# Patient Record
Sex: FEMALE | Race: WHITE | Employment: OTHER | URBAN - METROPOLITAN AREA
[De-identification: names, ages, dates, MRNs, and addresses within clinical notes are randomized per-mention and may not be internally consistent; named-entity substitution may affect disease eponyms.]

---

## 2020-01-16 ENCOUNTER — APPOINTMENT (OUTPATIENT)
Dept: GENERAL RADIOLOGY | Age: 70
End: 2020-01-16
Payer: MEDICARE

## 2020-01-16 ENCOUNTER — HOSPITAL ENCOUNTER (EMERGENCY)
Age: 70
Discharge: HOME | End: 2020-01-16
Attending: EMERGENCY MEDICINE
Payer: MEDICARE

## 2020-01-16 VITALS
WEIGHT: 160 LBS | TEMPERATURE: 99.2 F | HEIGHT: 64 IN | RESPIRATION RATE: 16 BRPM | DIASTOLIC BLOOD PRESSURE: 86 MMHG | SYSTOLIC BLOOD PRESSURE: 134 MMHG | OXYGEN SATURATION: 95 % | BODY MASS INDEX: 27.31 KG/M2 | HEART RATE: 85 BPM

## 2020-01-16 PROCEDURE — 70360 X-RAY EXAM OF NECK: CPT

## 2020-01-16 PROCEDURE — 99282 EMERGENCY DEPT VISIT SF MDM: CPT

## 2020-01-16 PROCEDURE — 96372 THER/PROPH/DIAG INJ SC/IM: CPT

## 2020-01-16 PROCEDURE — 6360000002 HC RX W HCPCS: Performed by: EMERGENCY MEDICINE

## 2020-01-16 RX ADMIN — PENICILLIN G BENZATHINE 1.2 MILLION UNITS: 1200000 INJECTION, SUSPENSION INTRAMUSCULAR at 21:33

## 2020-01-16 SDOH — HEALTH STABILITY: MENTAL HEALTH: HOW OFTEN DO YOU HAVE A DRINK CONTAINING ALCOHOL?: NEVER

## 2020-01-16 ASSESSMENT — PAIN DESCRIPTION - PAIN TYPE: TYPE: ACUTE PAIN

## 2020-01-16 ASSESSMENT — PAIN DESCRIPTION - DESCRIPTORS: DESCRIPTORS: SORE

## 2020-01-16 ASSESSMENT — PAIN DESCRIPTION - LOCATION: LOCATION: THROAT

## 2020-01-16 ASSESSMENT — PAIN SCALES - GENERAL: PAINLEVEL_OUTOF10: 8

## 2020-01-17 NOTE — ED PROVIDER NOTES
ago.  Patient was seen by her PCP one week ago and was given a Decadron shot for sinusitis. Patient had a sore throat at that time that has gotten increasingly worse over the past week. On physical examination the patient's posterior pharynx is erythematous, no exudates, no swelling. XR of the soft tissue of the neck ordered. Penicillin given. Discussed results with the patient and she will be discharged home and is to follow up if symptoms worsen. Re-Evaluations:           Re-evaluation. Patients symptoms show no change    Consultations:             none    Counseling: The emergency provider has spoken with the patient and discussed todays results, in addition to providing specific details for the plan of care and counseling regarding the diagnosis and prognosis. Questions are answered at this time and they are agreeable with the plan.     --------------------------------- IMPRESSION AND DISPOSITION ---------------------------------    IMPRESSION  1. Acute pharyngitis, unspecified etiology        DISPOSITION  Disposition: Discharge to home  Patient condition is stable    1/16/20, 8:48 PM.    This note is prepared by Binta Bird, acting as Scribe for Rudy Kramer MD.    Rudy Kramer MD:  The scribe's documentation has been prepared under my direction and personally reviewed by me in its entirety. I confirm that the note above accurately reflects all work, treatment, procedures, and medical decision making performed by me.            Rudy Kramer MD  01/17/20 6314

## 2020-05-28 ENCOUNTER — HOSPITAL ENCOUNTER (OUTPATIENT)
Age: 70
Setting detail: SPECIMEN
Discharge: HOME | End: 2020-05-28
Payer: MEDICARE

## 2020-05-28 PROCEDURE — U0003 INFECTIOUS AGENT DETECTION BY NUCLEIC ACID (DNA OR RNA); SEVERE ACUTE RESPIRATORY SYNDROME CORONAVIRUS 2 (SARS-COV-2) (CORONAVIRUS DISEASE [COVID-19]), AMPLIFIED PROBE TECHNIQUE, MAKING USE OF HIGH THROUGHPUT TECHNOLOGIES AS DESCRIBED BY CMS-2020-01-R: HCPCS

## 2020-05-29 LAB
SARS-COV-2: NOT DETECTED
SOURCE: NORMAL

## 2020-06-17 ENCOUNTER — HOSPITAL ENCOUNTER (OUTPATIENT)
Age: 70
Discharge: HOME | End: 2020-06-19
Payer: MEDICARE

## 2020-06-17 PROCEDURE — U0003 INFECTIOUS AGENT DETECTION BY NUCLEIC ACID (DNA OR RNA); SEVERE ACUTE RESPIRATORY SYNDROME CORONAVIRUS 2 (SARS-COV-2) (CORONAVIRUS DISEASE [COVID-19]), AMPLIFIED PROBE TECHNIQUE, MAKING USE OF HIGH THROUGHPUT TECHNOLOGIES AS DESCRIBED BY CMS-2020-01-R: HCPCS

## 2020-06-18 RX ORDER — AMITRIPTYLINE HYDROCHLORIDE 10 MG/1
10 TABLET, FILM COATED ORAL NIGHTLY
COMMUNITY

## 2020-06-20 LAB
SARS-COV-2: NOT DETECTED
SOURCE: NORMAL

## 2020-06-22 ENCOUNTER — ANESTHESIA EVENT (OUTPATIENT)
Dept: OPERATING ROOM | Age: 70
End: 2020-06-22
Payer: MEDICARE

## 2020-06-22 NOTE — ANESTHESIA PRE PROCEDURE
Department of Anesthesiology  Preprocedure Note       Name:  Dionte Allred   Age:  65 y.o.  :  1950                                          MRN:  [id number]         Date:  2020      Surgeon: Cristina Graf):  Rin Melvin MD    Procedure: Procedure(s):  RIGHT EYE CATARACT EMULSIFICATION IOL IMPLANT    Medications prior to admission:   Prior to Admission medications    Medication Sig Start Date End Date Taking? Authorizing Provider   amitriptyline (ELAVIL) 10 MG tablet Take 10 mg by mouth nightly Takes 60 mg   Yes Historical Provider, MD   propranolol (INDERAL) 10 MG tablet Take 10 mg by mouth 3 times daily. Yes Historical Provider, MD   ARMOUR THYROID PO Take 60 mg by mouth daily    Yes Historical Provider, MD   ALPRAZolam Quevedo Gammons) 0.5 MG tablet Take 0.5 mg by mouth nightly as needed for Sleep. Yes Historical Provider, MD   traMADol (ULTRAM) 50 MG tablet Take 50 mg by mouth nightly. Yes Historical Provider, MD   LISINOPRIL PO Take 20 mg by mouth daily 3pm    Historical Provider, MD   Pregabalin (LYRICA PO) Take 50 mg by mouth nightly     Historical Provider, MD       Current medications:    No current facility-administered medications for this encounter. Current Outpatient Medications   Medication Sig Dispense Refill    amitriptyline (ELAVIL) 10 MG tablet Take 10 mg by mouth nightly Takes 60 mg      propranolol (INDERAL) 10 MG tablet Take 10 mg by mouth 3 times daily.  ARMOUR THYROID PO Take 60 mg by mouth daily       ALPRAZolam (XANAX) 0.5 MG tablet Take 0.5 mg by mouth nightly as needed for Sleep.  traMADol (ULTRAM) 50 MG tablet Take 50 mg by mouth nightly.  LISINOPRIL PO Take 20 mg by mouth daily 3pm      Pregabalin (LYRICA PO) Take 50 mg by mouth nightly          Allergies:     Allergies   Allergen Reactions    Adhesive Tape Itching     rash    Medrol [Methylprednisolone] Other (See Comments)     States \" out of body experience\"    Codeine Nausea And Vomiting    Airway: Mallampati: III  TM distance: >3 FB   Neck ROM: full  Mouth opening: > = 3 FB Dental:          Pulmonary: breath sounds clear to auscultation      (-) not a current smoker                           Cardiovascular:  Exercise tolerance: good (>4 METS),   (+) hypertension:, valvular problems/murmurs: MVP,         Rhythm: regular  Rate: normal                    Neuro/Psych:   (+) depression/anxiety  (Anxiety)            GI/Hepatic/Renal:             Endo/Other:    (+) hypothyroidism::., .                 Abdominal:   (+) obese,         Vascular:                                      Anesthesia Plan      MAC     ASA 3       Induction: intravenous. Anesthetic plan and risks discussed with patient. Plan discussed with CRNA.     Attending anesthesiologist reviewed and agrees with Mirella Bazan MD   6/22/2020

## 2020-06-23 ENCOUNTER — HOSPITAL ENCOUNTER (OUTPATIENT)
Age: 70
Setting detail: OUTPATIENT SURGERY
Discharge: HOME | End: 2020-06-23
Attending: OPHTHALMOLOGY | Admitting: OPHTHALMOLOGY
Payer: MEDICARE

## 2020-06-23 ENCOUNTER — ANESTHESIA (OUTPATIENT)
Dept: OPERATING ROOM | Age: 70
End: 2020-06-23
Payer: MEDICARE

## 2020-06-23 VITALS
TEMPERATURE: 98.6 F | SYSTOLIC BLOOD PRESSURE: 140 MMHG | RESPIRATION RATE: 16 BRPM | DIASTOLIC BLOOD PRESSURE: 77 MMHG | OXYGEN SATURATION: 100 %

## 2020-06-23 VITALS
HEART RATE: 71 BPM | DIASTOLIC BLOOD PRESSURE: 74 MMHG | HEIGHT: 64 IN | TEMPERATURE: 97.2 F | RESPIRATION RATE: 14 BRPM | SYSTOLIC BLOOD PRESSURE: 135 MMHG | WEIGHT: 190 LBS | BODY MASS INDEX: 32.44 KG/M2 | OXYGEN SATURATION: 100 %

## 2020-06-23 PROBLEM — H26.9 RIGHT CATARACT: Status: ACTIVE | Noted: 2020-06-23

## 2020-06-23 PROCEDURE — 3700000000 HC ANESTHESIA ATTENDED CARE: Performed by: OPHTHALMOLOGY

## 2020-06-23 PROCEDURE — 6360000002 HC RX W HCPCS: Performed by: NURSE ANESTHETIST, CERTIFIED REGISTERED

## 2020-06-23 PROCEDURE — 2580000003 HC RX 258: Performed by: ANESTHESIOLOGY

## 2020-06-23 PROCEDURE — 6370000000 HC RX 637 (ALT 250 FOR IP): Performed by: OPHTHALMOLOGY

## 2020-06-23 PROCEDURE — V2632 POST CHMBR INTRAOCULAR LENS: HCPCS | Performed by: OPHTHALMOLOGY

## 2020-06-23 PROCEDURE — 7100000011 HC PHASE II RECOVERY - ADDTL 15 MIN: Performed by: OPHTHALMOLOGY

## 2020-06-23 PROCEDURE — 3600000003 HC SURGERY LEVEL 3 BASE: Performed by: OPHTHALMOLOGY

## 2020-06-23 PROCEDURE — 2500000003 HC RX 250 WO HCPCS: Performed by: OPHTHALMOLOGY

## 2020-06-23 PROCEDURE — 2709999900 HC NON-CHARGEABLE SUPPLY: Performed by: OPHTHALMOLOGY

## 2020-06-23 PROCEDURE — 7100000010 HC PHASE II RECOVERY - FIRST 15 MIN: Performed by: OPHTHALMOLOGY

## 2020-06-23 DEVICE — IMPLANTABLE DEVICE: Type: IMPLANTABLE DEVICE | Site: EYE | Status: FUNCTIONAL

## 2020-06-23 RX ORDER — PROMETHAZINE HYDROCHLORIDE 25 MG/ML
25 INJECTION, SOLUTION INTRAMUSCULAR; INTRAVENOUS
Status: DISCONTINUED | OUTPATIENT
Start: 2020-06-23 | End: 2020-06-23 | Stop reason: HOSPADM

## 2020-06-23 RX ORDER — LABETALOL HYDROCHLORIDE 5 MG/ML
5 INJECTION, SOLUTION INTRAVENOUS EVERY 10 MIN PRN
Status: DISCONTINUED | OUTPATIENT
Start: 2020-06-23 | End: 2020-06-23 | Stop reason: HOSPADM

## 2020-06-23 RX ORDER — FENTANYL CITRATE 50 UG/ML
50 INJECTION, SOLUTION INTRAMUSCULAR; INTRAVENOUS EVERY 5 MIN PRN
Status: DISCONTINUED | OUTPATIENT
Start: 2020-06-23 | End: 2020-06-23 | Stop reason: HOSPADM

## 2020-06-23 RX ORDER — MORPHINE SULFATE 2 MG/ML
1 INJECTION, SOLUTION INTRAMUSCULAR; INTRAVENOUS EVERY 5 MIN PRN
Status: DISCONTINUED | OUTPATIENT
Start: 2020-06-23 | End: 2020-06-23 | Stop reason: HOSPADM

## 2020-06-23 RX ORDER — MIDAZOLAM HYDROCHLORIDE 1 MG/ML
INJECTION INTRAMUSCULAR; INTRAVENOUS PRN
Status: DISCONTINUED | OUTPATIENT
Start: 2020-06-23 | End: 2020-06-23 | Stop reason: SDUPTHER

## 2020-06-23 RX ORDER — HYDRALAZINE HYDROCHLORIDE 20 MG/ML
5 INJECTION INTRAMUSCULAR; INTRAVENOUS EVERY 10 MIN PRN
Status: DISCONTINUED | OUTPATIENT
Start: 2020-06-23 | End: 2020-06-23 | Stop reason: HOSPADM

## 2020-06-23 RX ORDER — HYDROCODONE BITARTRATE AND ACETAMINOPHEN 5; 325 MG/1; MG/1
2 TABLET ORAL PRN
Status: DISCONTINUED | OUTPATIENT
Start: 2020-06-23 | End: 2020-06-23 | Stop reason: HOSPADM

## 2020-06-23 RX ORDER — SODIUM CHLORIDE, SODIUM LACTATE, POTASSIUM CHLORIDE, CALCIUM CHLORIDE 600; 310; 30; 20 MG/100ML; MG/100ML; MG/100ML; MG/100ML
INJECTION, SOLUTION INTRAVENOUS CONTINUOUS
Status: DISCONTINUED | OUTPATIENT
Start: 2020-06-23 | End: 2020-06-23 | Stop reason: HOSPADM

## 2020-06-23 RX ORDER — PHENYLEPHRINE HCL 2.5 %
1 DROPS OPHTHALMIC (EYE)
Status: COMPLETED | OUTPATIENT
Start: 2020-06-23 | End: 2020-06-23

## 2020-06-23 RX ORDER — TETRACAINE HYDROCHLORIDE 5 MG/ML
1 SOLUTION OPHTHALMIC ONCE
Status: COMPLETED | OUTPATIENT
Start: 2020-06-23 | End: 2020-06-23

## 2020-06-23 RX ORDER — HYDROCODONE BITARTRATE AND ACETAMINOPHEN 5; 325 MG/1; MG/1
1 TABLET ORAL PRN
Status: DISCONTINUED | OUTPATIENT
Start: 2020-06-23 | End: 2020-06-23 | Stop reason: HOSPADM

## 2020-06-23 RX ORDER — CYCLOPENTOLATE HYDROCHLORIDE 10 MG/ML
1 SOLUTION/ DROPS OPHTHALMIC
Status: COMPLETED | OUTPATIENT
Start: 2020-06-23 | End: 2020-06-23

## 2020-06-23 RX ORDER — BALANCED SALT SOLUTION 6.4; .75; .48; .3; 3.9; 1.7 MG/ML; MG/ML; MG/ML; MG/ML; MG/ML; MG/ML
SOLUTION OPHTHALMIC PRN
Status: DISCONTINUED | OUTPATIENT
Start: 2020-06-23 | End: 2020-06-23 | Stop reason: ALTCHOICE

## 2020-06-23 RX ORDER — FLURBIPROFEN SODIUM 0.3 MG/ML
1 SOLUTION/ DROPS OPHTHALMIC
Status: COMPLETED | OUTPATIENT
Start: 2020-06-23 | End: 2020-06-23

## 2020-06-23 RX ORDER — PHENYLEPHRINE HYDROCHLORIDE 100 MG/ML
1 SOLUTION/ DROPS OPHTHALMIC PRN
Status: DISCONTINUED | OUTPATIENT
Start: 2020-06-23 | End: 2020-06-23 | Stop reason: HOSPADM

## 2020-06-23 RX ORDER — FENTANYL CITRATE 50 UG/ML
INJECTION, SOLUTION INTRAMUSCULAR; INTRAVENOUS PRN
Status: DISCONTINUED | OUTPATIENT
Start: 2020-06-23 | End: 2020-06-23 | Stop reason: SDUPTHER

## 2020-06-23 RX ORDER — MEPERIDINE HYDROCHLORIDE 25 MG/ML
12.5 INJECTION INTRAMUSCULAR; INTRAVENOUS; SUBCUTANEOUS EVERY 5 MIN PRN
Status: DISCONTINUED | OUTPATIENT
Start: 2020-06-23 | End: 2020-06-23 | Stop reason: HOSPADM

## 2020-06-23 RX ORDER — DIPHENHYDRAMINE HYDROCHLORIDE 50 MG/ML
12.5 INJECTION INTRAMUSCULAR; INTRAVENOUS
Status: DISCONTINUED | OUTPATIENT
Start: 2020-06-23 | End: 2020-06-23 | Stop reason: HOSPADM

## 2020-06-23 RX ADMIN — FLURBIPROFEN SODIUM 1 DROP: 0.3 SOLUTION/ DROPS OPHTHALMIC at 05:44

## 2020-06-23 RX ADMIN — PHENYLEPHRINE HYDROCHLORIDE 1 DROP: 25 SOLUTION/ DROPS OPHTHALMIC at 05:40

## 2020-06-23 RX ADMIN — MIDAZOLAM 1 MG: 1 INJECTION INTRAMUSCULAR; INTRAVENOUS at 06:40

## 2020-06-23 RX ADMIN — MIDAZOLAM 1 MG: 1 INJECTION INTRAMUSCULAR; INTRAVENOUS at 06:38

## 2020-06-23 RX ADMIN — CYCLOPENTOLATE HYDROCHLORIDE 1 DROP: 10 SOLUTION/ DROPS OPHTHALMIC at 05:40

## 2020-06-23 RX ADMIN — FLURBIPROFEN SODIUM 1 DROP: 0.3 SOLUTION/ DROPS OPHTHALMIC at 05:48

## 2020-06-23 RX ADMIN — SODIUM CHLORIDE, POTASSIUM CHLORIDE, SODIUM LACTATE AND CALCIUM CHLORIDE: 600; 310; 30; 20 INJECTION, SOLUTION INTRAVENOUS at 06:04

## 2020-06-23 RX ADMIN — PHENYLEPHRINE HYDROCHLORIDE 1 DROP: 25 SOLUTION/ DROPS OPHTHALMIC at 05:45

## 2020-06-23 RX ADMIN — PHENYLEPHRINE HYDROCHLORIDE 1 DROP: 25 SOLUTION/ DROPS OPHTHALMIC at 05:49

## 2020-06-23 RX ADMIN — FENTANYL CITRATE 50 MCG: 50 INJECTION, SOLUTION INTRAMUSCULAR; INTRAVENOUS at 06:39

## 2020-06-23 RX ADMIN — TETRACAINE HYDROCHLORIDE 1 DROP: 25 LIQUID OPHTHALMIC at 06:20

## 2020-06-23 RX ADMIN — FENTANYL CITRATE 50 MCG: 50 INJECTION, SOLUTION INTRAMUSCULAR; INTRAVENOUS at 06:41

## 2020-06-23 RX ADMIN — CYCLOPENTOLATE HYDROCHLORIDE 1 DROP: 10 SOLUTION/ DROPS OPHTHALMIC at 05:48

## 2020-06-23 RX ADMIN — FLURBIPROFEN SODIUM 1 DROP: 0.3 SOLUTION/ DROPS OPHTHALMIC at 05:40

## 2020-06-23 RX ADMIN — CYCLOPENTOLATE HYDROCHLORIDE 1 DROP: 10 SOLUTION/ DROPS OPHTHALMIC at 05:44

## 2020-06-23 ASSESSMENT — PULMONARY FUNCTION TESTS
PIF_VALUE: 0

## 2020-06-23 ASSESSMENT — LIFESTYLE VARIABLES: SMOKING_STATUS: 0

## 2020-06-23 ASSESSMENT — PAIN SCALES - GENERAL
PAINLEVEL_OUTOF10: 0
PAINLEVEL_OUTOF10: 0

## 2020-06-23 ASSESSMENT — PAIN - FUNCTIONAL ASSESSMENT: PAIN_FUNCTIONAL_ASSESSMENT: 0-10

## 2020-06-23 NOTE — OP NOTE
PREOPERATIVE DIAGNOSIS:  Right Cataract    POSTOPERATIVE DIAGNOSIS:  Right Cataract    PROCEDURE:  Right phacoemulsification with intraocular lens implant. ANESTHESIA:  Local Mac    ESTIMATED BLOOD LOSS:  Minimal    COMPLICATIONS:  None    DESCRIPTION OF PROCEDURE:  The patient was brought into the operating room. The operative eye was marked, which was the right eye. The right eye was then prepped with a full-strength Betadine preparation. The periorbital area was copiously washed with Betadine. The lashes were washed with Betadine. Dilute Betadine was then also put in the inferior and superior fornices and left in place for approximately a minute or 2. This was then irrigated with sterile water. The face was then wiped and the preparation was repeated 1 more time. The drape was then placed over the operative eye with the sticky adhesive placed at the lid margins so that it draped the lashes out of the operative field superiorly and inferiorly, as well as isolated the meibomian glands behind the drape. This cleared the operative field of any meibomian gland secretions and eyelashes. Next, a lid speculum was positioned in the right eye. A super sharp blade was used to make a side-port incision at the 2 o'clock position. A clear corneal incision was fashioned over the 12 o;clock meridian with a 2.3mm keratome at the limbus. Healon was used to reform the anterior chamber. A continuous tear anterior capsulotomy was then performed with a bent needle cystotome. Hydrodissection was performed by irrigating with balanced salt solution through a syringe underneath the anterior capsular flap to loosen and allow free rotation of the lens nucleus. Phacoemulsification was used and the entire lens nucleus was removed. The I A unit was instilled in the eye, and the remaining cortex was removed. Healon was used to separate the anterior and posterior capsular flaps.   The PCBOO 12.5 diopter lens was then instilled into the capsular bag and dialed to 3 and 9 o'clock positions. Healon was removed from in front of and behind the lens. The lens was found to be well centered, well positioned in the bag and stable. The wound was checked and found to be airtight and watertight. The lid speculum was removed and the drape was removed. The patient was brought to the recovery room in excellent condition, given postoperative instructions, and discharge in excellent condition.  Operative Note      Patient: Geo Fierro  YOB: 1950  MRN: [id number]    Date of Procedure: 6/23/2020    Pre-Op Diagnosis: RIGHT EYE CATARACT    Post-Op Diagnosis: Same       Procedure(s):  RIGHT EYE CATARACT EMULSIFICATION IOL IMPLANT    Surgeon(s):  Carmen Hale MD    Assistant:   * No surgical staff found *    Anesthesia: Monitor Anesthesia Care    Estimated Blood Loss (mL): Minimal    Complications: None    Specimens:   * No specimens in log *    Implants:  * No implants in log *      Drains: * No LDAs found *    Findings: Right cataract    Detailed Description of Procedure:   Right cataract extraction with intra ocular lens implant    Electronically signed by Claire Call MD on 6/23/2020 at 6:36 AM

## 2021-05-04 ENCOUNTER — HOSPITAL ENCOUNTER (OUTPATIENT)
Dept: CT IMAGING | Age: 71
Discharge: HOME | End: 2021-05-06
Payer: MEDICARE

## 2021-05-04 DIAGNOSIS — R51.9 NONINTRACTABLE HEADACHE, UNSPECIFIED CHRONICITY PATTERN, UNSPECIFIED HEADACHE TYPE: ICD-10-CM

## 2021-05-04 PROCEDURE — 70450 CT HEAD/BRAIN W/O DYE: CPT

## 2022-05-27 ENCOUNTER — HOSPITAL ENCOUNTER (OUTPATIENT)
Age: 72
Discharge: HOME | End: 2022-05-29

## 2022-05-27 PROCEDURE — 88342 IMHCHEM/IMCYTCHM 1ST ANTB: CPT

## 2022-05-27 PROCEDURE — 88377 M/PHMTRC ALYS ISHQUANT/SEMIQ: CPT

## 2022-05-27 PROCEDURE — 88305 TISSUE EXAM BY PATHOLOGIST: CPT

## 2022-05-27 PROCEDURE — 88360 TUMOR IMMUNOHISTOCHEM/MANUAL: CPT

## 2022-05-27 PROCEDURE — 88341 IMHCHEM/IMCYTCHM EA ADD ANTB: CPT

## 2022-08-11 LAB
Lab: NORMAL
REPORT: NORMAL
THIS TEST SENT TO: NORMAL

## 2022-10-07 ENCOUNTER — HOSPITAL ENCOUNTER (OUTPATIENT)
Dept: INFUSION THERAPY | Age: 72
Discharge: HOME | End: 2022-10-07

## 2022-10-07 ENCOUNTER — OFFICE VISIT (OUTPATIENT)
Dept: ONCOLOGY | Age: 72
End: 2022-10-07
Payer: MEDICARE

## 2022-10-07 ENCOUNTER — TELEPHONE (OUTPATIENT)
Dept: INFUSION THERAPY | Age: 72
End: 2022-10-07

## 2022-10-07 ENCOUNTER — TELEPHONE (OUTPATIENT)
Dept: CASE MANAGEMENT | Age: 72
End: 2022-10-07

## 2022-10-07 VITALS
WEIGHT: 178 LBS | SYSTOLIC BLOOD PRESSURE: 119 MMHG | RESPIRATION RATE: 18 BRPM | HEART RATE: 87 BPM | OXYGEN SATURATION: 99 % | TEMPERATURE: 97.6 F | BODY MASS INDEX: 30.39 KG/M2 | HEIGHT: 64 IN | DIASTOLIC BLOOD PRESSURE: 56 MMHG

## 2022-10-07 DIAGNOSIS — Z85.3 PERSONAL HISTORY OF BREAST CANCER: ICD-10-CM

## 2022-10-07 DIAGNOSIS — A04.72 C. DIFFICILE COLITIS: Primary | ICD-10-CM

## 2022-10-07 PROCEDURE — G8484 FLU IMMUNIZE NO ADMIN: HCPCS | Performed by: INTERNAL MEDICINE

## 2022-10-07 PROCEDURE — G8400 PT W/DXA NO RESULTS DOC: HCPCS | Performed by: INTERNAL MEDICINE

## 2022-10-07 PROCEDURE — G8427 DOCREV CUR MEDS BY ELIG CLIN: HCPCS | Performed by: INTERNAL MEDICINE

## 2022-10-07 PROCEDURE — 99214 OFFICE O/P EST MOD 30 MIN: CPT | Performed by: INTERNAL MEDICINE

## 2022-10-07 PROCEDURE — 99205 OFFICE O/P NEW HI 60 MIN: CPT | Performed by: INTERNAL MEDICINE

## 2022-10-07 PROCEDURE — G8417 CALC BMI ABV UP PARAM F/U: HCPCS | Performed by: INTERNAL MEDICINE

## 2022-10-07 PROCEDURE — 1090F PRES/ABSN URINE INCON ASSESS: CPT | Performed by: INTERNAL MEDICINE

## 2022-10-07 PROCEDURE — 3017F COLORECTAL CA SCREEN DOC REV: CPT | Performed by: INTERNAL MEDICINE

## 2022-10-07 PROCEDURE — 1123F ACP DISCUSS/DSCN MKR DOCD: CPT | Performed by: INTERNAL MEDICINE

## 2022-10-07 PROCEDURE — 1036F TOBACCO NON-USER: CPT | Performed by: INTERNAL MEDICINE

## 2022-10-07 NOTE — TELEPHONE ENCOUNTER
Kobi Watson  10/7/2022  Wt Readings from Last 10 Encounters:   10/07/22 178 lb (80.7 kg)   06/23/20 190 lb (86.2 kg)   01/16/20 160 lb (72.6 kg)     Ht Readings from Last 1 Encounters:   10/07/22 5' 4\" (1.626 m)     BMI=30.55    Assessment: Visited with Cheryl Flores and her  while in for new patient visit with Dr. Millie Hale for breast cancer. Cheryl Flores reports she had decreased appetite and diarrhea associated with Cdiff. She had TCHP tx on 8/22/22 and experienced fatigue and diarrhea with that. Then treated with Vancomycin for cdiff. She reports her stools are now formed but not completely back to normal yet. She reports her appetite is great now, making up for decreased appetite previously. Discussed importance of adequate fluids, which she said she has always had a problem drinking enough. Discussed fluid options and discouraged caffeine containing fluids. Provided resources including \"Nutrition Therapy for Cancer Related Side Effects\", and \"Diarrhea and Cancer\". Encouraged her to call if questions arise. Weight change: 7.29% subjective significant weight change x 1 month (she had cdiff)  Appetite: Good appetite past week. Previous 3 weeks poor appetite after TCHP and Cdiff. Nutritional Side Effects: diarrhea, dysgeusia  Calculated Needs: 28-30kcal/kg/TIQ=9353-9758uxijl, 1.3-1.5gm/kg/IBW=70-80gm protein, 1ml/kcal=2250-2400ml fluids  Malnutrition Status: At risk  Nutrition Diagnosis: At risk r/t breast cancer    Recommendations: Consume 3 meals per day or 5-6 small nutrient dense meals per day. Drink plenty of fluids at least 72oz.      Prema Tinajero RD

## 2022-10-07 NOTE — PROGRESS NOTES
Soren Gracia  1950 74 y.o. Referring Physician: [facility]    PCP: Yany Puentes MD    Vitals:    10/07/22 1353   BP: (!) 119/56   Pulse: 87   Resp: 18   Temp: 97.6 °F (36.4 °C)   SpO2: 99%        Wt Readings from Last 3 Encounters:   10/07/22 178 lb (80.7 kg)   20 190 lb (86.2 kg)   20 160 lb (72.6 kg)        Body mass index is 30.55 kg/m². Chief Complaint:   Chief Complaint   Patient presents with    Breast Cancer    Follow-up         Cancer Staging  No matching staging information was found for the patient. Prior Radiation Therapy? NO    Concurrent Chemo/radiation? NO    Prior Chemotherapy? YES: Site Treated: Breast          Facility: [facility]          Date:     Prior Hormonal Therapy? NO    Head and Neck Cancer? No, patient does NOT have HN cancer. LMP: 200    Age at first Menses: 4    : 1    Para: 1          Current Outpatient Medications:     amitriptyline (ELAVIL) 10 MG tablet, Take 10 mg by mouth nightly Takes 60 mg, Disp: , Rfl:     propranolol (INDERAL) 10 MG tablet, Take 10 mg by mouth 3 times daily. , Disp: , Rfl:     ARMOUR THYROID PO, Take 60 mg by mouth daily , Disp: , Rfl:     LISINOPRIL PO, Take 20 mg by mouth daily 3pm, Disp: , Rfl:     ALPRAZolam (XANAX) 0.5 MG tablet, Take 0.5 mg by mouth nightly as needed for Sleep., Disp: , Rfl:     traMADol (ULTRAM) 50 MG tablet, Take 50 mg by mouth nightly., Disp: , Rfl:     Pregabalin (LYRICA PO), Take 50 mg by mouth nightly , Disp: , Rfl:        Past Medical History:   Diagnosis Date    Anxiety     Hypertension     Mitral valve prolapse     Thyroid disease        Past Surgical History:   Procedure Laterality Date    CATARACT REMOVAL WITH IMPLANT Right 2020    HYSTERECTOMY (CERVIX STATUS UNKNOWN)      INTRACAPSULAR CATARACT EXTRACTION Right 2020    RIGHT EYE CATARACT EMULSIFICATION IOL IMPLANT performed by Shalini Murphy MD at [address] [place] [address]       History reviewed.  No pertinent family history. Social History     Socioeconomic History    Marital status:      Spouse name: Not on file    Number of children: Not on file    Years of education: Not on file    Highest education level: Not on file   Occupational History    Not on file   Tobacco Use    Smoking status: Never    Smokeless tobacco: Never   Vaping Use    Vaping Use: Never used   Substance and Sexual Activity    Alcohol use: Never    Drug use: Never    Sexual activity: Not on file   Other Topics Concern    Not on file   Social History Narrative    Not on file     Social Determinants of Health     Financial Resource Strain: Not on file   Food Insecurity: Not on file   Transportation Needs: Not on file   Physical Activity: Not on file   Stress: Not on file   Social Connections: Not on file   Intimate Partner Violence: Not on file   Housing Stability: Not on file           Occupation: [employer]  Retired:  NO          REVIEW OF SYSTEMS: <<For Level 5, 10 or more systems>>     Pacemaker/Defibulator/ICD:  No    Mediport: Yes           FALLS RISK SCREENING ASSESSMENT    Instructions:  Assess the patient and [place] the appropriate indicators that are present for fall risk identification. Total the numbers circled and assign a fall risk score from Table 2.  Reassess patient at a minimum every 12 weeks or with status change. Assessment   Date  10/7/2022     1. Mental Ability: confusion/cognitively impaired No - 0       2. Elimination Issues: incontinence, frequency No - 0       3. Ambulatory: use of assistive devices (walker, cane, off-loading devices), attached to equipment (IV pole, oxygen) No - 0     4. Sensory Limitations: dizziness, vertigo, impaired vision No - 0       5. Age 72 years or greater - 1       10. Medication: diuretics, strong analgesics, hypnotics, sedatives, antihypertensive agents   Yes - 3   7.   Falls:  recent history of falls within the last 3 months (not to include slipping or tripping)   No - 0   TOTAL 4    If score of 4 or greater was education given? Yes       TABLE 2   Risk Score Risk Level Plan of Care   0-3 Little or  No Risk 1. Provide assistance as indicated for ambulation activities  2. Reorient confused/cognitively impaired patient  3. Call-light/bell within patient's reach  4. Chair/bed in low position, stretcher/bed with siderails up except when performing patient care activities  5. Educate patient/family/caregiver on falls prevention  6.  Reassess in 12 weeks or with any noted change in patient condition which places them at a risk for a fall   4-6 Moderate Risk 1. Provide assistance as indicated for ambulation activities  2. Reorient confused/cognitively impaired patient  3. Call-light/bell within patient's reach  4. Chair/bed in low position, stretcher/bed with siderails up except when performing patient care activities  5. Educate patient/family/caregiver on falls prevention  6. Falls risk precaution (Yellow sticker Level II) placed on patient chart   7 or   Higher High Risk 1. Place patient in easily observable treatment room  2. Patient attended at all times by family member or staff  3. Provide assistance as indicated for ambulation activities  4. Reorient confused/cognitively impaired patient  5. Call-light/bell within patient's reach  6. Chair/bed in low position, stretcher/bed with siderails up except when performing patient care activities  7. Educate patient/family/caregiver on falls prevention  8. Falls risk precaution (Yellow sticker Level III) placed on patient chart           MALNUTRITION RISK SCREENING ASSESSMENT    Instructions:  Assess the patient and enter the appropriate indicators that are present for nutrition risk identification. Total the numbers entered and assign a risk score. Follow the appropriate action for total score listed below. Assessment   Date  10/7/2022     Have you lost weight without trying?       0- No     Have you been eating poorly because of a decreased appetite? 0- No   3. Do you have a diagnosis of head and neck cancer? 0- No                                                                                    TOTAL 0        Score of 0-1: No action  Score 2 or greater: For Non-Diabetic Patient: Recommend adding Ensure Enlive 2 x daily and provide patient with Ensure wellness bag with coupons  For Diabetic Patient: Recommend adding Glucerna Shake 2 x daily and provide patient with Glucerna Wellness bag with coupons  Route to the dietitian via [address]    Are you having  difficulty performing daily routine tasks  due to fatigue or weakness (ie: bathing/showering, dressing, housework, meal prep, work, child Brendalyn Rakers): No     Do you have any arm flexibility/ROM restrictions, swelling or pain that limit activity: No     Any changes in memory, attention/focus that impact daily activities: No     Do you avoid participation in leisure/social activity due weakness, fatigue or pain: No     ARE ANY OF THE ABOVE ARE ANSWERED YES: No          PT ASSESSMENT FOR REFERRAL    Have you had any recent falls in past 2 months: No     Do you have difficulty  going up/down stairs: No     Are you having difficulty walking: No     Do you often hold onto furniture/environmental supports or feel off balance when you are walking: No     Do you need to take rest breaks when you are walking: No     Any pain on scale of 1-10 that limits your mobility: No 0/10    ARE ANY OF THE ABOVE ARE ANSWERED YES: No           PREHAB REFERRALS FOR NEOADJUVANT BREAST CANCER PATIENTS    Is this patient a breast cancer patient requiring neoadjuvant chemotherapy: No, this patient does NOT require neoadjuvant chemotherapy.           LYMPHEDEMA SCREENING ASSESSMENT FOR PATIENTS WITH BREAST CANCER    The patient reports the following signs/symptoms of lymphedema: None    Please ask the provider to assess patient for lymphedema for any reported signs or symptoms so a referral to Lymphedema Therapy can be considered. PELVIC FLOOR DYSFUNCTION SCREENING ASSESSMENT    - Do you have any pelvic pain? No     - Do you have any fecal constipation or fecal incontinence? No     - Do you have any urinary incontinence or difficulty starting to urinate? No     ARE ANY OF THE ABOVE ARE ANSWERED YES: No           PREHAB AUDIOLOGY REFERRAL    - Is patient planned to receive Cisplatin? No. This patient is not planned to start Cisplatin. - Is patient complaining of new onset hearing loss? No. Patient is not complaining of new onset hearing loss.         Nickie Sky RN

## 2022-10-10 NOTE — PROGRESS NOTES
Vomiting    Sulfa Antibiotics Nausea And Vomiting       Physical Exam:  BP (!) 119/56   Pulse 87   Temp 97.6 °F (36.4 °C) (Infrared)   Resp 18   Ht 5' 4\" (1.626 m)   Wt 178 lb (80.7 kg)   SpO2 99%   BMI 30.55 kg/m²   GENERAL: Alert, oriented x 3, not in acute distress. HEENT: PERRLA; EOMI. Oropharynx clear. NECK: Supple. No palpable cervical or supraclavicular lymphadenopathy. LUNGS: Good air entry bilaterally. No wheezing, crackles or rhonchi. CARDIOVASCULAR: Regular rate. No murmurs, rubs or gallops. BREASTS: The right breast exam is negative for any skin changes, no nipple discharge, no palpable right axillary lymphadenopathy, left breast exam is remarkable for a mass, measuring about 4 cm, fullness in the left axilla. ABDOMEN: Soft. Non-tender, non-distended. Positive bowel sounds. EXTREMITIES: Without clubbing, cyanosis, or edema. NEUROLOGIC: No focal deficits. ECOG PS 1    Impression/Plan:     Mrs. Fortino Feldman is a 75-year-old lady, with a past medical history significant for anxiety, hypothyroidism, and peripheral neuropathy, had presented with an abnormal screening mammogram from 5/24/2022, it had revealed new 3 6 mm mass in the upper outer left breast and 19 mm lymph node in the left axilla, on 5/27/2022 she underwent an ultrasound-guided left breast mass core biopsy, pathology had revealed invasive poorly differentiated carcinoma, grade 3, ER +80% NH +90% HER2/alex 2+ by IHC, positive by FISH, the patient had on 6/20/2022 CT scans of the chest, abdomen and pelvis done, revealing enlarged left axillary lymph node measuring just under 2 cm, no evidence of distant metastatic disease, she had a 2D echocardiogram done on 8/28/2022, revealing normal LVEF at 70%, patient was under the care of Dr. Theresa King, she received the first cycle of TCHP on 8/22/2022, the course was complicated by C. difficile infection which was diagnosed on 9/8/2022. She completed the course of oral vancomycin.   The patient had just started to feel rare and like herself again, the diarrhea had resolved. The breast mass had decreased in size. Patient has T2 N1 M0 breast cancer, hormone receptor and HER2/alex positive, prognosis was discussed with the patient, amended to continue the neoadjuvant therapy, with the second cycle we will continue with LILIAN ZAZUETA, if she does well we can reintroduce Perjeta with the subsequent cycles. Recommended evaluation by ID prior to restarting the treatment, to evaluate whether or not she needs antibiotics for the C. difficile while undergoing treatment. Adjuvant endocrine therapy and radiation therapy will be recommended. She would like to have her surgery done with Dr. Raleigh Kawasaki. All her questions were answered to her apparent satisfaction, referred to palliative medicine to help with symptoms management. RTC with treatment. Thank you for allowing us to participate in the care of Mrs. Gita Pizarro.     Raul Quintana MD   HEMATOLOGY/MEDICAL [address] MED ONCOLOGY  [address] [place] [id number]  Dept: 71 Kristi Kingsley: [phone]

## 2022-10-11 ENCOUNTER — TELEPHONE (OUTPATIENT)
Dept: PALLATIVE CARE | Age: 72
End: 2022-10-11

## 2022-10-11 NOTE — TELEPHONE ENCOUNTER
SW contacted pt regarding referral to Palliative Medicine from Dr. Giacomo Yip. Spoke with pt regarding referral, explained PM and how we can help pt. After explaining role, pt decided she did not want to schedule an appt at this time due to not having any symptoms that she felt that she needed managed at this time. Pt stated she was waiting to hear from Infectious Disease doctor, this was also a referral placed by Dr. Giacomo Yip. Provided pt with this phone number so she could contact office if she pleased. Pt was appreciative of phone call and provided pt with PM number if needed.       Amber Mijares MSW,LSW  Palliative Medicine

## 2022-10-13 ENCOUNTER — TELEPHONE (OUTPATIENT)
Dept: CASE MANAGEMENT | Age: 72
End: 2022-10-13

## 2022-10-20 ENCOUNTER — TELEPHONE (OUTPATIENT)
Dept: INFUSION THERAPY | Age: 72
End: 2022-10-20

## 2022-10-20 NOTE — TELEPHONE ENCOUNTER
Reached out to the patient as she had expressed concern about the cost of her medications. She stated that she was on her way out the door to an appt and asked that I call her tomorrow. I will follow up with her then.

## 2022-10-24 ENCOUNTER — HOSPITAL ENCOUNTER (OUTPATIENT)
Dept: INFUSION THERAPY | Age: 72
Discharge: HOME | End: 2022-10-24
Payer: MEDICARE

## 2022-10-24 DIAGNOSIS — Z85.3 PERSONAL HISTORY OF BREAST CANCER: Primary | ICD-10-CM

## 2022-10-24 DIAGNOSIS — Z85.3 PERSONAL HISTORY OF BREAST CANCER: ICD-10-CM

## 2022-10-24 PROCEDURE — 99214 OFFICE O/P EST MOD 30 MIN: CPT

## 2022-10-24 NOTE — PROGRESS NOTES
Patient was seen today for chemotherapy education for [facility] for breast cancer. Patient was accompanied her . The schedule and mechanisms of action were discussed in detail. Some of the side effects discussed include, but are not limited to, neutropenia, hair loss, peripheral neuropathy, diarrhea, and infection. Patient demonstrated comprehension and understanding throughout the education session. A packet containing the information discussed was provided to the patient. Patient asked questions regarding staging which I deferred to Dr. Adam Montes. I explained the rationale behind using Emmette Dex and that it wouldn't interact with her chemotherapy. She asked about putting her hands in ice during treatment to prevent neuropathy which I explained that there is no data to support that and is not a standard approach. All other questions asked were answered or deferred to the oncology team. Patient encouraged to reach out to their treatment team with any other additional questions or concerns that they may have.     Bonny Ramey, DeloresD, BCOP

## 2022-10-24 NOTE — PROGRESS NOTES
Patient arrived to unit for chemo teaching. This nurse provided patient with a chemo video and educational packet. After patient viewed the video, This nurse discussed a typical day on the infusion unit and what his treatment day would be like. After chemo sheet was discussed. Patient was given opportunity to ask questions and advised to write down any questions he may have and bring them to the next visit. Patient verbalized understanding.

## 2022-10-25 ENCOUNTER — OFFICE VISIT (OUTPATIENT)
Dept: ONCOLOGY | Age: 72
End: 2022-10-25
Payer: MEDICARE

## 2022-10-25 ENCOUNTER — TELEPHONE (OUTPATIENT)
Dept: INFUSION THERAPY | Age: 72
End: 2022-10-25

## 2022-10-25 ENCOUNTER — HOSPITAL ENCOUNTER (OUTPATIENT)
Dept: INFUSION THERAPY | Age: 72
Discharge: HOME | End: 2022-10-25
Payer: MEDICARE

## 2022-10-25 VITALS
WEIGHT: 180 LBS | HEART RATE: 69 BPM | TEMPERATURE: 97.5 F | SYSTOLIC BLOOD PRESSURE: 125 MMHG | RESPIRATION RATE: 14 BRPM | DIASTOLIC BLOOD PRESSURE: 70 MMHG | HEIGHT: 64 IN | BODY MASS INDEX: 30.73 KG/M2 | OXYGEN SATURATION: 95 %

## 2022-10-25 DIAGNOSIS — A04.71 RECURRENT CLOSTRIDIUM DIFFICILE DIARRHEA: ICD-10-CM

## 2022-10-25 DIAGNOSIS — Z85.3 PERSONAL HISTORY OF BREAST CANCER: Primary | ICD-10-CM

## 2022-10-25 PROCEDURE — 1123F ACP DISCUSS/DSCN MKR DOCD: CPT | Performed by: INTERNAL MEDICINE

## 2022-10-25 PROCEDURE — 99212 OFFICE O/P EST SF 10 MIN: CPT

## 2022-10-25 PROCEDURE — 3017F COLORECTAL CA SCREEN DOC REV: CPT | Performed by: INTERNAL MEDICINE

## 2022-10-25 PROCEDURE — 1090F PRES/ABSN URINE INCON ASSESS: CPT | Performed by: INTERNAL MEDICINE

## 2022-10-25 PROCEDURE — G8400 PT W/DXA NO RESULTS DOC: HCPCS | Performed by: INTERNAL MEDICINE

## 2022-10-25 PROCEDURE — G8417 CALC BMI ABV UP PARAM F/U: HCPCS | Performed by: INTERNAL MEDICINE

## 2022-10-25 PROCEDURE — 96523 IRRIG DRUG DELIVERY DEVICE: CPT

## 2022-10-25 PROCEDURE — G8427 DOCREV CUR MEDS BY ELIG CLIN: HCPCS | Performed by: INTERNAL MEDICINE

## 2022-10-25 PROCEDURE — 6360000002 HC RX W HCPCS: Performed by: INTERNAL MEDICINE

## 2022-10-25 PROCEDURE — 2580000003 HC RX 258: Performed by: INTERNAL MEDICINE

## 2022-10-25 PROCEDURE — G8484 FLU IMMUNIZE NO ADMIN: HCPCS | Performed by: INTERNAL MEDICINE

## 2022-10-25 PROCEDURE — 1036F TOBACCO NON-USER: CPT | Performed by: INTERNAL MEDICINE

## 2022-10-25 PROCEDURE — 99214 OFFICE O/P EST MOD 30 MIN: CPT | Performed by: INTERNAL MEDICINE

## 2022-10-25 RX ORDER — SODIUM CHLORIDE 9 MG/ML
5-250 INJECTION, SOLUTION INTRAVENOUS PRN
Status: CANCELLED | OUTPATIENT
Start: 2022-10-25

## 2022-10-25 RX ORDER — HEPARIN SODIUM (PORCINE) LOCK FLUSH IV SOLN 100 UNIT/ML 100 UNIT/ML
500 SOLUTION INTRAVENOUS PRN
Status: CANCELLED | OUTPATIENT
Start: 2022-10-25

## 2022-10-25 RX ORDER — SODIUM CHLORIDE 0.9 % (FLUSH) 0.9 %
5-40 SYRINGE (ML) INJECTION PRN
Status: DISCONTINUED | OUTPATIENT
Start: 2022-10-25 | End: 2022-10-26 | Stop reason: HOSPADM

## 2022-10-25 RX ORDER — HEPARIN SODIUM (PORCINE) LOCK FLUSH IV SOLN 100 UNIT/ML 100 UNIT/ML
500 SOLUTION INTRAVENOUS PRN
Status: DISCONTINUED | OUTPATIENT
Start: 2022-10-25 | End: 2022-10-26 | Stop reason: HOSPADM

## 2022-10-25 RX ORDER — SODIUM CHLORIDE 0.9 % (FLUSH) 0.9 %
5-40 SYRINGE (ML) INJECTION PRN
Status: CANCELLED | OUTPATIENT
Start: 2022-10-25

## 2022-10-25 RX ADMIN — SODIUM CHLORIDE, PRESERVATIVE FREE 10 ML: 5 INJECTION INTRAVENOUS at 08:51

## 2022-10-25 RX ADMIN — HEPARIN 500 UNITS: 100 SYRINGE at 08:51

## 2022-10-25 NOTE — TELEPHONE ENCOUNTER
Discussed with infectious disease office/Dr. Evangelista's office, regarding IV Varner Wittenberg, a one time IV infusion, If patient has to hold chemo tx until this is completed. After reviewing with office, they stated , per DR. Kendall Saldaña, patient does NOT have to wait until she has infusion, she MAY have chemo treatment and they can work around infusion if patient agree's to infusion. Per Dr. Guerline Demarco, we will hold chemo until zinplava infused this week

## 2022-10-25 NOTE — PROGRESS NOTES
701  [facility] MED ONCOLOGY  [address] [facility] 29. [id number]  Dept: 71 Kristi Kingsley: [phone]  Attending progress note      Reason for Visit:   Left breast cancer. Referring Physician: Dr. Juno Fitch. PCP:  Sherry Knapp MD    History of Present Illness:    Mrs. Merlinda Ree is a 66-year-old lady, with a past medical history significant for anxiety, hypothyroidism, and peripheral neuropathy, had presented with an abnormal screening mammogram from 5/24/2022, it had revealed new 3 6 mm mass in the upper outer left breast and 19 mm lymph node in the left axilla, on 5/27/2022 she underwent an ultrasound-guided left breast mass core biopsy, pathology had revealed invasive poorly differentiated carcinoma, grade 3, ER +80% UT +90% HER2/alex 2+ by IHC, positive by FISH, the patient had on 6/20/2022 CT scans of the chest, abdomen and pelvis done, revealing enlarged left axillary lymph node measuring just under 2 cm, no evidence of distant metastatic disease, she had a 2D echocardiogram done on 8/28/2022, revealing normal LVEF at 70%, patient was under the care of Dr. Juno Fitch, she received the first cycle of TCHP on 8/22/2022, the course was complicated by C. difficile infection which was diagnosed on 9/8/2022. She completed the course of oral vancomycin. The patient had just started to feel rare and like herself again, the diarrhea had resolved. The breast mass had decreased in size. Review of Systems;  CONSTITUTIONAL: No fever, chills. Decreased appetite and energy level. ENMT: Eyes: No diplopia; Nose: No epistaxis. Mouth: No sore throat. RESPIRATORY: No hemoptysis, shortness of breath, cough. CARDIOVASCULAR: No chest pain, palpitations. GASTROINTESTINAL: No nausea/vomiting, abdominal pain, diarrhea/constipation. GENITOURINARY: No dysuria, urinary frequency, hematuria. NEURO: No syncope, presyncope, headache.   Remainder:  ROS NEGATIVE    Past Medical History:      Diagnosis Date    Anxiety     Hypertension     Mitral valve prolapse     Thyroid disease      Patient Active Problem List   Diagnosis    Right cataract    Personal history of breast cancer        Past Surgical History:      Procedure Laterality Date    CATARACT REMOVAL WITH IMPLANT Right 2020    HYSTERECTOMY (CERVIX STATUS UNKNOWN)      INTRACAPSULAR CATARACT EXTRACTION Right 2020    RIGHT EYE CATARACT EMULSIFICATION IOL IMPLANT performed by Jose Robb MD at [address] [place] [address]       Family History:  Family History   Problem Relation Age of Onset    Hypertension Mother     Heart Attack Father         26    Lung Cancer Brother 76    Heart Disease Brother        Medications:  Reviewed and reconciled. Social History:  Social History     Socioeconomic History    Marital status:      Spouse name: Not on file    Number of children: Not on file    Years of education: Not on file    Highest education level: Not on file   Occupational History    Not on file   Tobacco Use    Smoking status: Former     Types: Cigarettes     Quit date: 1971     Years since quittin.8    Smokeless tobacco: Never   Vaping Use    Vaping Use: Never used   Substance and Sexual Activity    Alcohol use: Never    Drug use: Never    Sexual activity: Not on file   Other Topics Concern    Not on file   Social History Narrative    Not on file     Social Determinants of Health     Financial Resource Strain: Not on file   Food Insecurity: Not on file   Transportation Needs: Not on file   Physical Activity: Not on file   Stress: Not on file   Social Connections: Not on file   Intimate Partner Violence: Not on file   Housing Stability: Not on file       Allergies:   Allergies   Allergen Reactions    Adhesive Tape Itching     rash    Medrol [Methylprednisolone] Other (See Comments)     States \" out of body experience\"    Codeine Nausea And Vomiting    Darvocet A500 [Propoxyphene N-Acetaminophen] Nausea And Vomiting    Sulfa Antibiotics Nausea And Vomiting       Physical Exam:  /70   Pulse 69   Temp 97.5 °F (36.4 °C) (Infrared)   Resp 14   Ht 5' 4\" (1.626 m)   Wt 180 lb (81.6 kg)   SpO2 95%   BMI 30.90 kg/m²   GENERAL: Alert, oriented x 3, not in acute distress. HEENT: PERRLA; EOMI. Oropharynx clear. NECK: Supple. No palpable cervical or supraclavicular lymphadenopathy. LUNGS: Good air entry bilaterally. No wheezing, crackles or rhonchi. CARDIOVASCULAR: Regular rate. No murmurs, rubs or gallops. BREASTS:  left breast exam is remarkable for a mass, measuring about 4 cm, fullness in the left axilla. ABDOMEN: Soft. Non-tender, non-distended. Positive bowel sounds. EXTREMITIES: Without clubbing, cyanosis, or edema. NEUROLOGIC: No focal deficits. ECOG PS 1    Impression/Plan:     Mrs. Angy Bourne is a 70-year-old lady, with a past medical history significant for anxiety, hypothyroidism, and peripheral neuropathy, had presented with an abnormal screening mammogram from 5/24/2022, it had revealed new 36 mm mass in the upper outer left breast and 19 mm lymph node in the left axilla, on 5/27/2022 she underwent an ultrasound-guided left breast mass core biopsy, pathology had revealed invasive poorly differentiated carcinoma, grade 3, ER +80% OH +90% HER2/alex 2+ by IHC, positive by FISH, the patient had on 6/20/2022 CT scans of the chest, abdomen and pelvis done, revealing enlarged left axillary lymph node measuring just under 2 cm, no evidence of distant metastatic disease, she had a 2D echocardiogram done on 8/28/2022, revealing normal LVEF at 70%, patient was under the care of Dr. Gavino Azul, she received the first cycle of TCHP on 8/22/2022, the course was complicated by C. difficile infection which was diagnosed on 9/8/2022. She completed the course of oral vancomycin. The patient had just started to feel rare and like herself again, the diarrhea had resolved.   The breast mass had decreased in size.    Patient has T2 N1 M0 breast cancer, hormone receptor and HER2/alex positive, prognosis was discussed with the patient, amended to continue the neoadjuvant therapy, with the second cycle we will continue with LILIAN ZAZUETA, if she does well we can reintroduce Perjeta with the subsequent cycles. Recommended evaluation by ID prior to restarting the treatment, the patient was seen by William Carroll NP, was recommended Zinplava infusion, the patient like to hold off on the treatment today, and have the infusion done prior to starting chemotherapy. We will reschedule for next week. Adjuvant endocrine therapy and radiation therapy will be recommended. She would like to have her surgery done with Dr. Mckenzie Munroe. All her questions were answered to her apparent satisfaction,     Referred to palliative medicine to help with symptoms management. RTC in 1 week    Thank you for allowing us to participate in the care of Mrs. Cely Arroyo.     Felicia Huerta MD   HEMATOLOGY/MEDICAL 150 [facility]  [address] [place] MED ONCOLOGY  [address] [place] [id number]  Dept: 71 Kristi Kingsley: [phone]

## 2022-10-31 ENCOUNTER — HOSPITAL ENCOUNTER (OUTPATIENT)
Dept: INFUSION THERAPY | Age: 72
Discharge: HOME | End: 2022-10-31
Payer: MEDICARE

## 2022-10-31 ENCOUNTER — HOSPITAL ENCOUNTER (OUTPATIENT)
Dept: INFUSION THERAPY | Age: 72
Setting detail: INFUSION SERIES
Discharge: HOME | End: 2022-10-31
Payer: MEDICARE

## 2022-10-31 VITALS
RESPIRATION RATE: 16 BRPM | HEIGHT: 64 IN | BODY MASS INDEX: 30.46 KG/M2 | OXYGEN SATURATION: 99 % | SYSTOLIC BLOOD PRESSURE: 127 MMHG | WEIGHT: 178.4 LBS | TEMPERATURE: 98.6 F | DIASTOLIC BLOOD PRESSURE: 57 MMHG | HEART RATE: 59 BPM

## 2022-10-31 DIAGNOSIS — A04.71 RECURRENT CLOSTRIDIUM DIFFICILE DIARRHEA: Primary | ICD-10-CM

## 2022-10-31 DIAGNOSIS — Z85.3 PERSONAL HISTORY OF BREAST CANCER: ICD-10-CM

## 2022-10-31 LAB
ALBUMIN SERPL-MCNC: 3.9 G/DL (ref 3.5–5.2)
ALP BLD-CCNC: 105 U/L (ref 35–104)
ALT SERPL-CCNC: 11 U/L (ref 0–32)
ANION GAP SERPL CALCULATED.3IONS-SCNC: 8 MMOL/L (ref 7–16)
AST SERPL-CCNC: 26 U/L (ref 0–31)
BASOPHILS ABSOLUTE: 0.06 E9/L (ref 0–0.2)
BASOPHILS RELATIVE PERCENT: 1 % (ref 0–2)
BILIRUB SERPL-MCNC: 0.3 MG/DL (ref 0–1.2)
BUN BLDV-MCNC: 8 MG/DL (ref 6–23)
CALCIUM SERPL-MCNC: 9.4 MG/DL (ref 8.6–10.2)
CHLORIDE BLD-SCNC: 104 MMOL/L (ref 98–107)
CO2: 27 MMOL/L (ref 22–29)
CREAT SERPL-MCNC: 0.7 MG/DL (ref 0.5–1)
EOSINOPHILS ABSOLUTE: 0.24 E9/L (ref 0.05–0.5)
EOSINOPHILS RELATIVE PERCENT: 4.2 % (ref 0–6)
GFR SERPL CREATININE-BSD FRML MDRD: >60 ML/MIN/1.73
GLUCOSE BLD-MCNC: 106 MG/DL (ref 74–99)
HCT VFR BLD CALC: 37 % (ref 34–48)
HEMOGLOBIN: 12.7 G/DL (ref 11.5–15.5)
IMMATURE GRANULOCYTES #: 0.02 E9/L
IMMATURE GRANULOCYTES %: 0.3 % (ref 0–5)
LYMPHOCYTES ABSOLUTE: 1.99 E9/L (ref 1.5–4)
LYMPHOCYTES RELATIVE PERCENT: 34.7 % (ref 20–42)
MCH RBC QN AUTO: 31.2 PG (ref 26–35)
MCHC RBC AUTO-ENTMCNC: 34.3 % (ref 32–34.5)
MCV RBC AUTO: 90.9 FL (ref 80–99.9)
MONOCYTES ABSOLUTE: 0.49 E9/L (ref 0.1–0.95)
MONOCYTES RELATIVE PERCENT: 8.6 % (ref 2–12)
NEUTROPHILS ABSOLUTE: 2.93 E9/L (ref 1.8–7.3)
NEUTROPHILS RELATIVE PERCENT: 51.2 % (ref 43–80)
PDW BLD-RTO: 14 FL (ref 11.5–15)
PLATELET # BLD: 213 E9/L (ref 130–450)
PMV BLD AUTO: 9.9 FL (ref 7–12)
POTASSIUM SERPL-SCNC: 4.5 MMOL/L (ref 3.5–5)
RBC # BLD: 4.07 E12/L (ref 3.5–5.5)
SODIUM BLD-SCNC: 139 MMOL/L (ref 132–146)
TOTAL PROTEIN: 6.7 G/DL (ref 6.4–8.3)
WBC # BLD: 5.7 E9/L (ref 4.5–11.5)

## 2022-10-31 PROCEDURE — 2580000003 HC RX 258: Performed by: SPECIALIST

## 2022-10-31 PROCEDURE — 80053 COMPREHEN METABOLIC PANEL: CPT

## 2022-10-31 PROCEDURE — 96365 THER/PROPH/DIAG IV INF INIT: CPT

## 2022-10-31 PROCEDURE — 85025 COMPLETE CBC W/AUTO DIFF WBC: CPT

## 2022-10-31 PROCEDURE — 36415 COLL VENOUS BLD VENIPUNCTURE: CPT

## 2022-10-31 PROCEDURE — 6360000002 HC RX W HCPCS: Performed by: SPECIALIST

## 2022-10-31 RX ORDER — SODIUM CHLORIDE 9 MG/ML
5-250 INJECTION, SOLUTION INTRAVENOUS PRN
Status: CANCELLED | OUTPATIENT
Start: 2022-10-31

## 2022-10-31 RX ORDER — HEPARIN SODIUM (PORCINE) LOCK FLUSH IV SOLN 100 UNIT/ML 100 UNIT/ML
500 SOLUTION INTRAVENOUS PRN
Status: CANCELLED | OUTPATIENT
Start: 2022-10-31

## 2022-10-31 RX ORDER — SODIUM CHLORIDE 0.9 % (FLUSH) 0.9 %
5-40 SYRINGE (ML) INJECTION PRN
Status: CANCELLED | OUTPATIENT
Start: 2022-10-31

## 2022-10-31 RX ORDER — SODIUM CHLORIDE 0.9 % (FLUSH) 0.9 %
5-40 SYRINGE (ML) INJECTION PRN
Status: DISCONTINUED | OUTPATIENT
Start: 2022-10-31 | End: 2022-11-01 | Stop reason: HOSPADM

## 2022-10-31 RX ORDER — SODIUM CHLORIDE 9 MG/ML
5-250 INJECTION, SOLUTION INTRAVENOUS PRN
Status: DISCONTINUED | OUTPATIENT
Start: 2022-10-31 | End: 2022-11-01 | Stop reason: HOSPADM

## 2022-10-31 RX ADMIN — SODIUM CHLORIDE 100 ML/HR: 9 INJECTION, SOLUTION INTRAVENOUS at 09:25

## 2022-10-31 RX ADMIN — SODIUM CHLORIDE 800 MG: 9 INJECTION, SOLUTION INTRAVENOUS at 09:28

## 2022-10-31 RX ADMIN — SODIUM CHLORIDE, PRESERVATIVE FREE 10 ML: 5 INJECTION INTRAVENOUS at 08:55

## 2022-10-31 NOTE — DISCHARGE INSTRUCTIONS
bezlotoxumab  Pronunciation:  BEZ jesus TOX ue mab  Brand:  Hilda Riley  What is the most important information I should know about bezlotoxumab? Before you receive bezlotoxumab, tell your doctor about all your medical conditions or allergies, and all the medicines you are using. Also make sure your doctor knows if you are pregnant or breast-feeding. What is bezlotoxumab? Bezlotoxumab is a monoclonal antibody. Monoclonal antibodies are made to target only certain cells in the body. Bezlotoxumab works by binding to a specific toxin produced by the Clostridium difficile bacteria, to help neutralize the toxin's effects. Bezlotoxumab is used together with antibiotic medicine in adults with Clostridium difficile (C. difficile), an infection that can cause life-threatening diarrhea. Bezlotoxumab may help keep this infection from coming back after treatment. Bezlotoxumab is not an antibiotic and will not treat the infection itself. Bezlotoxumab may also be used for purposes not listed in this medication guide. What should I discuss with my healthcare provider before receiving bezlotoxumab? To make sure bezlotoxumab is safe for you, tell your doctor if you have:  congestive heart failure. It is not known whether this medicine will harm an unborn baby. Tell your doctor if you are pregnant. It is not known whether bezlotoxumab passes into breast milk or if it could harm a nursing baby. Tell your doctor if you are breast-feeding a baby. Bezlotoxumab is not approved for use by anyone younger than 25years old. How is bezlotoxumab given? Bezlotoxumab is injected into a vein through an IV. A healthcare provider will give you this injection. This medicine must be given slowly, and the IV infusion can take about 60 minutes to complete. Bezlotoxumab has no antibacterial effects and will not treat the underlying infection. You must use antibiotic medication to treat C. difficile infection.   Use your antibiotic medication for the full prescribed length of time. Your symptoms may improve before the infection is completely cleared. Skipping doses of your antibiotic may also increase your risk of further infection that is resistant to antibiotics. Read all patient information, medication guides, and instruction sheets provided to you. Ask your doctor or pharmacist if you have any questions. What happens if I miss a dose? Since bezlotoxumab is used as a single dose, it does not have a daily dosing schedule. What happens if I overdose? Since this medicine is given by a healthcare professional in a medical setting, an overdose is unlikely to occur. What should I avoid after receiving bezlotoxumab? Follow your doctor's instructions about any restrictions on food, beverages, or activity. What are the possible side effects of bezlotoxumab? Get emergency medical help if you have signs of an allergic reaction: hives; difficult breathing; swelling of your face, lips, tongue, or throat. Call your doctor at once if you have:  worsening symptoms of C. difficile infection, such as severe stomach pain or watery diarrhea;  swelling in your hands, ankles, or feet;  rapid weight gain; or  shortness of breath (even with mild exertion). Common side effects may include:  nausea;  fever; or  headache. This is not a complete list of side effects and others may occur. Call your doctor for medical advice about side effects. You may report side effects to FDA at 5-209-FDA-7002. What other drugs will affect bezlotoxumab? Other drugs may interact with bezlotoxumab, including prescription and over-the-counter medicines, vitamins, and herbal products. Tell each of your health care providers about all medicines you use now and any medicine you start or stop using. Where can I get more information? Your doctor or pharmacist can provide more information about bezlotoxumab.   Remember, keep this and all other medicines out of the reach of children, never share your medicines with others, and use this medication only for the indication prescribed. Every effort has been made to ensure that the information provided by [address] is accurate, up-to-date, and complete, but no guarantee is made to that effect. Drug information contained herein may be time sensitive. [facility] information has been compiled for use by healthcare practitioners and consumers in the [place] and therefore [facility] does not warrant that uses outside of the [place] are appropriate, unless specifically indicated otherwise. [facility]'s drug information does not endorse drugs, diagnose patients or recommend therapy. [facility]'s drug information is an informational resource designed to assist licensed healthcare practitioners in caring for their patients and/or to serve consumers viewing this service as a supplement to, and not a substitute for, the expertise, skill, knowledge and judgment of healthcare practitioners. The absence of a warning for a given drug or drug combination in no way should be construed to indicate that the drug or drug combination is safe, effective or appropriate for any given patient. [facility] does not assume any responsibility for any aspect of healthcare administered with the aid of information [facility] provides. The information contained herein is not intended to cover all possible uses, directions, precautions, warnings, drug interactions, allergic reactions, or adverse effects. If you have questions about the drugs you are taking, check with your doctor, nurse or pharmacist.  Copyright 0163-6470 [address] [place] [address] Dr MARSHALL. Version: 1.02. Revision date: 1/31/2017. Care instructions adapted under license by [facility] ([facility]). If you have questions about a medical condition or this instruction, always ask your healthcare professional. [place] [address] any warranty or liability for your use of this information.

## 2022-10-31 NOTE — FLOWSHEET NOTE
Patient tolerated infusion well. Patient alert and oriented x3. No distress noted. Vital signs stable. Patient denies any new or worsening pain. Patient educated on signs and symptoms of reaction to medication. Educated patient on possible side effects and treatment of medication. Patient verbalized understanding. Offered patient education an/or discharge material.  Patient RECEIVED. Patient denies any needs. All questions answered.

## 2022-11-01 ENCOUNTER — HOSPITAL ENCOUNTER (OUTPATIENT)
Dept: INFUSION THERAPY | Age: 72
Discharge: HOME | End: 2022-11-01
Payer: MEDICARE

## 2022-11-01 ENCOUNTER — TELEPHONE (OUTPATIENT)
Dept: ONCOLOGY | Age: 72
End: 2022-11-01

## 2022-11-01 ENCOUNTER — OFFICE VISIT (OUTPATIENT)
Dept: ONCOLOGY | Age: 72
End: 2022-11-01
Payer: MEDICARE

## 2022-11-01 VITALS
SYSTOLIC BLOOD PRESSURE: 131 MMHG | DIASTOLIC BLOOD PRESSURE: 66 MMHG | OXYGEN SATURATION: 99 % | TEMPERATURE: 97.4 F | HEART RATE: 62 BPM | RESPIRATION RATE: 17 BRPM

## 2022-11-01 VITALS
WEIGHT: 180 LBS | OXYGEN SATURATION: 98 % | HEIGHT: 64 IN | DIASTOLIC BLOOD PRESSURE: 60 MMHG | RESPIRATION RATE: 16 BRPM | HEART RATE: 72 BPM | BODY MASS INDEX: 30.73 KG/M2 | TEMPERATURE: 97.3 F | SYSTOLIC BLOOD PRESSURE: 128 MMHG

## 2022-11-01 DIAGNOSIS — Z85.3 PERSONAL HISTORY OF BREAST CANCER: Primary | ICD-10-CM

## 2022-11-01 PROCEDURE — 1090F PRES/ABSN URINE INCON ASSESS: CPT | Performed by: INTERNAL MEDICINE

## 2022-11-01 PROCEDURE — 96367 TX/PROPH/DG ADDL SEQ IV INF: CPT

## 2022-11-01 PROCEDURE — 99214 OFFICE O/P EST MOD 30 MIN: CPT | Performed by: INTERNAL MEDICINE

## 2022-11-01 PROCEDURE — G8427 DOCREV CUR MEDS BY ELIG CLIN: HCPCS | Performed by: INTERNAL MEDICINE

## 2022-11-01 PROCEDURE — 96413 CHEMO IV INFUSION 1 HR: CPT

## 2022-11-01 PROCEDURE — 2580000003 HC RX 258: Performed by: INTERNAL MEDICINE

## 2022-11-01 PROCEDURE — 1123F ACP DISCUSS/DSCN MKR DOCD: CPT | Performed by: INTERNAL MEDICINE

## 2022-11-01 PROCEDURE — 3017F COLORECTAL CA SCREEN DOC REV: CPT | Performed by: INTERNAL MEDICINE

## 2022-11-01 PROCEDURE — 96377 APPLICATON ON-BODY INJECTOR: CPT

## 2022-11-01 PROCEDURE — 96415 CHEMO IV INFUSION ADDL HR: CPT

## 2022-11-01 PROCEDURE — G8400 PT W/DXA NO RESULTS DOC: HCPCS | Performed by: INTERNAL MEDICINE

## 2022-11-01 PROCEDURE — 96417 CHEMO IV INFUS EACH ADDL SEQ: CPT

## 2022-11-01 PROCEDURE — 96375 TX/PRO/DX INJ NEW DRUG ADDON: CPT

## 2022-11-01 PROCEDURE — 1036F TOBACCO NON-USER: CPT | Performed by: INTERNAL MEDICINE

## 2022-11-01 PROCEDURE — 6370000000 HC RX 637 (ALT 250 FOR IP): Performed by: INTERNAL MEDICINE

## 2022-11-01 PROCEDURE — G8417 CALC BMI ABV UP PARAM F/U: HCPCS | Performed by: INTERNAL MEDICINE

## 2022-11-01 PROCEDURE — 6360000002 HC RX W HCPCS: Performed by: INTERNAL MEDICINE

## 2022-11-01 PROCEDURE — G8484 FLU IMMUNIZE NO ADMIN: HCPCS | Performed by: INTERNAL MEDICINE

## 2022-11-01 RX ORDER — SODIUM CHLORIDE 9 MG/ML
5-40 INJECTION INTRAVENOUS PRN
Status: CANCELLED | OUTPATIENT
Start: 2022-11-01

## 2022-11-01 RX ORDER — SODIUM CHLORIDE 0.9 % (FLUSH) 0.9 %
5-40 SYRINGE (ML) INJECTION PRN
Status: CANCELLED | OUTPATIENT
Start: 2022-11-01

## 2022-11-01 RX ORDER — SODIUM CHLORIDE 9 MG/ML
5-250 INJECTION, SOLUTION INTRAVENOUS PRN
Status: DISCONTINUED | OUTPATIENT
Start: 2022-11-01 | End: 2022-11-02 | Stop reason: HOSPADM

## 2022-11-01 RX ORDER — DIPHENHYDRAMINE HYDROCHLORIDE 50 MG/ML
50 INJECTION INTRAMUSCULAR; INTRAVENOUS
Status: CANCELLED | OUTPATIENT
Start: 2022-11-01

## 2022-11-01 RX ORDER — SODIUM CHLORIDE 9 MG/ML
5-250 INJECTION, SOLUTION INTRAVENOUS PRN
Status: CANCELLED | OUTPATIENT
Start: 2022-11-01

## 2022-11-01 RX ORDER — ALBUTEROL SULFATE 90 UG/1
4 AEROSOL, METERED RESPIRATORY (INHALATION) PRN
Status: CANCELLED | OUTPATIENT
Start: 2022-11-01

## 2022-11-01 RX ORDER — PALONOSETRON 0.05 MG/ML
0.25 INJECTION, SOLUTION INTRAVENOUS ONCE
Status: CANCELLED | OUTPATIENT
Start: 2022-11-01 | End: 2022-11-01

## 2022-11-01 RX ORDER — ACETAMINOPHEN 325 MG/1
650 TABLET ORAL
Status: CANCELLED | OUTPATIENT
Start: 2022-11-01

## 2022-11-01 RX ORDER — PALONOSETRON HYDROCHLORIDE 0.05 MG/ML
0.25 INJECTION, SOLUTION INTRAVENOUS ONCE
Status: COMPLETED | OUTPATIENT
Start: 2022-11-01 | End: 2022-11-01

## 2022-11-01 RX ORDER — ACETAMINOPHEN 325 MG/1
650 TABLET ORAL
Status: COMPLETED | OUTPATIENT
Start: 2022-11-01 | End: 2022-11-01

## 2022-11-01 RX ORDER — EPINEPHRINE 1 MG/ML
0.3 INJECTION, SOLUTION, CONCENTRATE INTRAVENOUS PRN
Status: CANCELLED | OUTPATIENT
Start: 2022-11-01

## 2022-11-01 RX ORDER — SODIUM CHLORIDE 9 MG/ML
5-40 INJECTION INTRAVENOUS PRN
Status: DISCONTINUED | OUTPATIENT
Start: 2022-11-01 | End: 2022-11-02 | Stop reason: HOSPADM

## 2022-11-01 RX ORDER — SODIUM CHLORIDE 9 MG/ML
INJECTION, SOLUTION INTRAVENOUS CONTINUOUS
Status: CANCELLED | OUTPATIENT
Start: 2022-11-01

## 2022-11-01 RX ORDER — ONDANSETRON 2 MG/ML
8 INJECTION INTRAMUSCULAR; INTRAVENOUS
Status: CANCELLED | OUTPATIENT
Start: 2022-11-01

## 2022-11-01 RX ORDER — DIPHENHYDRAMINE HYDROCHLORIDE 50 MG/ML
50 INJECTION INTRAMUSCULAR; INTRAVENOUS
Status: COMPLETED | OUTPATIENT
Start: 2022-11-01 | End: 2022-11-01

## 2022-11-01 RX ORDER — DEXAMETHASONE SODIUM PHOSPHATE 10 MG/ML
10 INJECTION, SOLUTION INTRAMUSCULAR; INTRAVENOUS ONCE
Status: COMPLETED | OUTPATIENT
Start: 2022-11-01 | End: 2022-11-01

## 2022-11-01 RX ORDER — FAMOTIDINE 10 MG/ML
20 INJECTION, SOLUTION INTRAVENOUS
Status: CANCELLED | OUTPATIENT
Start: 2022-11-01

## 2022-11-01 RX ORDER — HEPARIN SODIUM (PORCINE) LOCK FLUSH IV SOLN 100 UNIT/ML 100 UNIT/ML
500 SOLUTION INTRAVENOUS PRN
Status: CANCELLED | OUTPATIENT
Start: 2022-11-01

## 2022-11-01 RX ORDER — MEPERIDINE HYDROCHLORIDE 50 MG/ML
12.5 INJECTION INTRAMUSCULAR; INTRAVENOUS; SUBCUTANEOUS PRN
Status: CANCELLED | OUTPATIENT
Start: 2022-11-01

## 2022-11-01 RX ADMIN — SODIUM CHLORIDE, PRESERVATIVE FREE 10 ML: 5 INJECTION INTRAVENOUS at 09:17

## 2022-11-01 RX ADMIN — DOCETAXEL 120 MG: 20 INJECTION, SOLUTION INTRAVENOUS at 12:28

## 2022-11-01 RX ADMIN — SODIUM CHLORIDE 25 ML/HR: 9 INJECTION, SOLUTION INTRAVENOUS at 09:17

## 2022-11-01 RX ADMIN — CARBOPLATIN 500 MG: 10 INJECTION, SOLUTION INTRAVENOUS at 13:51

## 2022-11-01 RX ADMIN — PALONOSETRON 0.25 MG: 0.25 INJECTION, SOLUTION INTRAVENOUS at 09:17

## 2022-11-01 RX ADMIN — PEGFILGRASTIM 6 MG: KIT SUBCUTANEOUS at 13:55

## 2022-11-01 RX ADMIN — SODIUM CHLORIDE, PRESERVATIVE FREE 10 ML: 5 INJECTION INTRAVENOUS at 14:39

## 2022-11-01 RX ADMIN — ACETAMINOPHEN 650 MG: 325 TABLET, FILM COATED ORAL at 09:17

## 2022-11-01 RX ADMIN — FOSAPREPITANT 150 MG: 150 INJECTION, POWDER, LYOPHILIZED, FOR SOLUTION INTRAVENOUS at 09:45

## 2022-11-01 RX ADMIN — DEXAMETHASONE SODIUM PHOSPHATE 10 MG: 10 INJECTION, SOLUTION INTRAMUSCULAR; INTRAVENOUS at 09:17

## 2022-11-01 RX ADMIN — DIPHENHYDRAMINE HYDROCHLORIDE 50 MG: 50 INJECTION, SOLUTION INTRAMUSCULAR; INTRAVENOUS at 09:17

## 2022-11-01 RX ADMIN — SODIUM CHLORIDE 651 MG: 9 INJECTION, SOLUTION INTRAVENOUS at 10:29

## 2022-11-01 NOTE — PROGRESS NOTES
701  [facility] MED ONCOLOGY  [address] [facility] 29. [id number]  Dept: 71 Kristi Kingsley: [phone]  Attending progress note      Reason for Visit:   Left breast cancer. Referring Physician: Dr. Clarissa Barajas. PCP:  River Jiang MD    History of Present Illness:    Mrs. Lui Villeda is a 47-year-old lady, with a past medical history significant for anxiety, hypothyroidism, and peripheral neuropathy, had presented with an abnormal screening mammogram from 5/24/2022, it had revealed new 3 6 mm mass in the upper outer left breast and 19 mm lymph node in the left axilla, on 5/27/2022 she underwent an ultrasound-guided left breast mass core biopsy, pathology had revealed invasive poorly differentiated carcinoma, grade 3, ER +80% ME +90% HER2/alex 2+ by IHC, positive by FISH, the patient had on 6/20/2022 CT scans of the chest, abdomen and pelvis done, revealing enlarged left axillary lymph node measuring just under 2 cm, no evidence of distant metastatic disease, she had a 2D echocardiogram done on 8/28/2022, revealing normal LVEF at 70%, patient was under the care of Dr. Clarissa Barajas, she received the first cycle of TCHP on 8/22/2022, the course was complicated by C. difficile infection which was diagnosed on 9/8/2022. She completed the course of oral vancomycin. The patient had just started to feel rare and like herself again, the diarrhea had resolved. The breast mass had decreased in size. Today 11/1/2022, the patient will resume treatment, she received the Ema Fruit yesterday. Review of Systems;  CONSTITUTIONAL: No fever, chills. Decreased appetite and energy level. ENMT: Eyes: No diplopia; Nose: No epistaxis. Mouth: No sore throat. RESPIRATORY: No hemoptysis, shortness of breath, cough. CARDIOVASCULAR: No chest pain, palpitations. GASTROINTESTINAL: No nausea/vomiting, abdominal pain, diarrhea/constipation.   GENITOURINARY: No dysuria, urinary frequency, hematuria. NEURO: No syncope, presyncope, headache. Remainder:  ROS NEGATIVE    Past Medical History:      Diagnosis Date    Anxiety     Hypertension     Mitral valve prolapse     Thyroid disease      Patient Active Problem List   Diagnosis    Right cataract    Personal history of breast cancer    Recurrent Clostridium difficile diarrhea        Past Surgical History:      Procedure Laterality Date    CATARACT REMOVAL WITH IMPLANT Right 2020    HYSTERECTOMY (CERVIX STATUS UNKNOWN)      INTRACAPSULAR CATARACT EXTRACTION Right 2020    RIGHT EYE CATARACT EMULSIFICATION IOL IMPLANT performed by Alvarez Mendiola MD at [address] [place] [address]       Family History:  Family History   Problem Relation Age of Onset    Hypertension Mother     Heart Attack Father         26    Lung Cancer Brother 76    Heart Disease Brother        Medications:  Reviewed and reconciled. Social History:  Social History     Socioeconomic History    Marital status:      Spouse name: Not on file    Number of children: Not on file    Years of education: Not on file    Highest education level: Not on file   Occupational History    Not on file   Tobacco Use    Smoking status: Former     Types: Cigarettes     Quit date: 1971     Years since quittin.8    Smokeless tobacco: Never   Vaping Use    Vaping Use: Never used   Substance and Sexual Activity    Alcohol use: Never    Drug use: Never    Sexual activity: Not on file   Other Topics Concern    Not on file   Social History Narrative    Not on file     Social Determinants of Health     Financial Resource Strain: Not on file   Food Insecurity: Not on file   Transportation Needs: Not on file   Physical Activity: Not on file   Stress: Not on file   Social Connections: Not on file   Intimate Partner Violence: Not on file   Housing Stability: Not on file       Allergies:   Allergies   Allergen Reactions    Adhesive Tape Itching     rash    Medrol [Methylprednisolone] Other (See Comments)     States \" out of body experience\"    Codeine Nausea And Vomiting    Darvocet A500 [Propoxyphene N-Acetaminophen] Nausea And Vomiting    Sulfa Antibiotics Nausea And Vomiting       Physical Exam:  /60   Pulse 72   Temp 97.3 °F (36.3 °C) (Infrared)   Resp 16   Ht 5' 4\" (1.626 m)   Wt 180 lb (81.6 kg)   SpO2 98%   BMI 30.90 kg/m²   GENERAL: Alert, oriented x 3, not in acute distress. HEENT: PERRLA; EOMI. Oropharynx clear. NECK: Supple. No palpable cervical or supraclavicular lymphadenopathy. LUNGS: Good air entry bilaterally. No wheezing, crackles or rhonchi. CARDIOVASCULAR: Regular rate. No murmurs, rubs or gallops. BREASTS:  left breast exam is remarkable for a mass, measuring about 4 cm, fullness in the left axilla. ABDOMEN: Soft. Non-tender, non-distended. Positive bowel sounds. EXTREMITIES: Without clubbing, cyanosis, or edema. NEUROLOGIC: No focal deficits. ECOG PS 1    Impression/Plan:     Mrs. Héctor Phillips is a 39-year-old lady, with a past medical history significant for anxiety, hypothyroidism, and peripheral neuropathy, had presented with an abnormal screening mammogram from 5/24/2022, it had revealed new 36 mm mass in the upper outer left breast and 19 mm lymph node in the left axilla, on 5/27/2022 she underwent an ultrasound-guided left breast mass core biopsy, pathology had revealed invasive poorly differentiated carcinoma, grade 3, ER +80% WA +90% HER2/alex 2+ by IHC, positive by FISH, the patient had on 6/20/2022 CT scans of the chest, abdomen and pelvis done, revealing enlarged left axillary lymph node measuring just under 2 cm, no evidence of distant metastatic disease, she had a 2D echocardiogram done on 8/28/2022, revealing normal LVEF at 70%, patient was under the care of Dr. Niyah Candelaria, she received the first cycle of TCHP on 8/22/2022, the course was complicated by C. difficile infection which was diagnosed on 9/8/2022. She completed the course of oral vancomycin. The patient had just started to feel rare and like herself again, the diarrhea had resolved. The breast mass had decreased in size. Patient has T2 N1 M0 breast cancer, hormone receptor and HER2/alex positive, prognosis was discussed with the patient, amended to continue the neoadjuvant therapy, with the second cycle we will continue with LILIAN ZAZUTEA, if she does well we can reintroduce Perjeta with the subsequent cycles. Recommended evaluation by ID prior to restarting the treatment, the patient was seen by Kayy Stern NP, was recommended Zinplava infusion, which she had completed. Today 11/1/2022, the patient will resume the treatment, [facility] with the second cycle, with dose adjustment for better tolerance, we will plan on adding Perjeta for the subsequent cycles if she does well with this treatment. Labs reviewed, proceed with LILIAN ZAZUETA. Adjuvant endocrine therapy and radiation therapy will be recommended. She would like to have her surgery done with Dr. Willie Rothman. All her questions were answered to her apparent satisfaction,     Referred to palliative medicine to help with symptoms management. RTC in 1 week. Thank you for allowing us to participate in the care of Mrs. Jeff Wright.     Graham Cardenas MD   HEMATOLOGY/MEDICAL [address] MED ONCOLOGY  [address] [place] [id number]  Dept: 71 Kristi Kingsley: [phone]

## 2022-11-01 NOTE — TELEPHONE ENCOUNTER
Met with pt and pt's  in conjunction with medical oncology visit re: positive distress screen. Pt is 25-year-old female being managed for breast cancer. Pt's mood appeared euthymic with congruent affect, she appeared A&Ox4, and she was willing/able to participate in session. She appeared appropriately dressed/groomed and was able to ambulate/transfer without assistance. Pt discussed recent issues with c diff noting that she had been severely ill for several weeks. She stated that she has completed for treatment with this and will follow up with ID as needed. Pt reported that she recently signed up for [employer]; explored positive ways to use this resource to improve care and pt experience. No additional needs identified at this time. Reviewed role of oncology SW and encouraged pt to notify this provider if additional needs arise.     ABRIL Wu, ROSA-S  Oncology Social Worker

## 2022-11-07 ENCOUNTER — TELEPHONE (OUTPATIENT)
Dept: INFUSION THERAPY | Age: 72
End: 2022-11-07

## 2022-11-07 ENCOUNTER — HOSPITAL ENCOUNTER (OUTPATIENT)
Dept: INFUSION THERAPY | Age: 72
Discharge: HOME | End: 2022-11-07
Payer: MEDICARE

## 2022-11-07 DIAGNOSIS — Z85.3 PERSONAL HISTORY OF BREAST CANCER: ICD-10-CM

## 2022-11-07 LAB
ALBUMIN SERPL-MCNC: 4 G/DL (ref 3.5–5.2)
ALP BLD-CCNC: 144 U/L (ref 35–104)
ALT SERPL-CCNC: 13 U/L (ref 0–32)
ANION GAP SERPL CALCULATED.3IONS-SCNC: 9 MMOL/L (ref 7–16)
AST SERPL-CCNC: 19 U/L (ref 0–31)
ATYPICAL LYMPHOCYTE RELATIVE PERCENT: 1.7 % (ref 0–4)
BASOPHILS ABSOLUTE: 0 E9/L (ref 0–0.2)
BASOPHILS RELATIVE PERCENT: 0.5 % (ref 0–2)
BILIRUB SERPL-MCNC: 0.3 MG/DL (ref 0–1.2)
BUN BLDV-MCNC: 6 MG/DL (ref 6–23)
CALCIUM SERPL-MCNC: 9.7 MG/DL (ref 8.6–10.2)
CHLORIDE BLD-SCNC: 101 MMOL/L (ref 98–107)
CO2: 27 MMOL/L (ref 22–29)
CREAT SERPL-MCNC: 0.8 MG/DL (ref 0.5–1)
EOSINOPHILS ABSOLUTE: 0.09 E9/L (ref 0.05–0.5)
EOSINOPHILS RELATIVE PERCENT: 0.9 % (ref 0–6)
GFR SERPL CREATININE-BSD FRML MDRD: >60 ML/MIN/1.73
GLUCOSE BLD-MCNC: 133 MG/DL (ref 74–99)
HCT VFR BLD CALC: 39.6 % (ref 34–48)
HEMOGLOBIN: 13.3 G/DL (ref 11.5–15.5)
LYMPHOCYTES ABSOLUTE: 1.76 E9/L (ref 1.5–4)
LYMPHOCYTES RELATIVE PERCENT: 16.5 % (ref 20–42)
MCH RBC QN AUTO: 30.9 PG (ref 26–35)
MCHC RBC AUTO-ENTMCNC: 33.6 % (ref 32–34.5)
MCV RBC AUTO: 91.9 FL (ref 80–99.9)
METAMYELOCYTES RELATIVE PERCENT: 0.9 % (ref 0–1)
MONOCYTES ABSOLUTE: 1.08 E9/L (ref 0.1–0.95)
MONOCYTES RELATIVE PERCENT: 11.3 % (ref 2–12)
NEUTROPHILS ABSOLUTE: 6.86 E9/L (ref 1.8–7.3)
NEUTROPHILS RELATIVE PERCENT: 68.7 % (ref 43–80)
OVALOCYTES: ABNORMAL
PDW BLD-RTO: 13.8 FL (ref 11.5–15)
PLATELET # BLD: 142 E9/L (ref 130–450)
PMV BLD AUTO: 11.1 FL (ref 7–12)
POIKILOCYTES: ABNORMAL
POTASSIUM SERPL-SCNC: 4.3 MMOL/L (ref 3.5–5)
RBC # BLD: 4.31 E12/L (ref 3.5–5.5)
SODIUM BLD-SCNC: 137 MMOL/L (ref 132–146)
TEAR DROP CELLS: ABNORMAL
TOTAL PROTEIN: 6.5 G/DL (ref 6.4–8.3)
TOXIC GRANULATION: ABNORMAL
VACUOLATED NEUTROPHILS: ABNORMAL
WBC # BLD: 9.8 E9/L (ref 4.5–11.5)

## 2022-11-07 PROCEDURE — 36415 COLL VENOUS BLD VENIPUNCTURE: CPT

## 2022-11-07 PROCEDURE — 85025 COMPLETE CBC W/AUTO DIFF WBC: CPT

## 2022-11-07 PROCEDURE — 80053 COMPREHEN METABOLIC PANEL: CPT

## 2022-11-07 NOTE — TELEPHONE ENCOUNTER
Called Ms. Paula Hawthorne regarding reported symptoms of diarrhea over the weekend. She said she had 5 water BMs on Sunday and called wondering if she can take Imodium since she was told not to with her recent C. Diff infection. When I called, she just recently had a BM that was more formed and her frequency has lessened as well. I explained that she did the right thing not taking the Imodium because even though this can most likely be explained as a side effect of her chemotherapy, a recurrence would need to be ruled out prior to taking any Imodium. She has an appointment for labs today so I explained that we could test her stool but if its formed, then the test likely wont be ran. Given that her symptoms are improving, she agreed to hold off on the test at this time. She has a standing order from ID for a C. Diff test so I told her to collect a sample if the diarrhea returns and to take it to a lab to get tested. She also reported swelling on one side of her face, swelling of her tongue, runny nose, and nosebleeds. I explained that it could be a sinus infection, but she would need to be evaluated and she already has an appointment with Dr. Cinthia Velez tomorrow so she will follow-up with her regarding these symptoms.     Shreyas Chauhan, PharmD, BCOP

## 2022-11-08 ENCOUNTER — HOSPITAL ENCOUNTER (OUTPATIENT)
Dept: INFUSION THERAPY | Age: 72
Discharge: HOME | End: 2022-11-08
Payer: MEDICARE

## 2022-11-08 ENCOUNTER — OFFICE VISIT (OUTPATIENT)
Dept: ONCOLOGY | Age: 72
End: 2022-11-08
Payer: MEDICARE

## 2022-11-08 VITALS
DIASTOLIC BLOOD PRESSURE: 60 MMHG | RESPIRATION RATE: 16 BRPM | HEART RATE: 70 BPM | TEMPERATURE: 97.2 F | OXYGEN SATURATION: 98 % | SYSTOLIC BLOOD PRESSURE: 122 MMHG | BODY MASS INDEX: 29.19 KG/M2 | WEIGHT: 171 LBS | HEIGHT: 64 IN

## 2022-11-08 VITALS
SYSTOLIC BLOOD PRESSURE: 114 MMHG | TEMPERATURE: 97.4 F | HEART RATE: 82 BPM | OXYGEN SATURATION: 98 % | DIASTOLIC BLOOD PRESSURE: 62 MMHG

## 2022-11-08 DIAGNOSIS — Z85.3 PERSONAL HISTORY OF BREAST CANCER: Primary | ICD-10-CM

## 2022-11-08 PROCEDURE — 1036F TOBACCO NON-USER: CPT | Performed by: INTERNAL MEDICINE

## 2022-11-08 PROCEDURE — 1090F PRES/ABSN URINE INCON ASSESS: CPT | Performed by: INTERNAL MEDICINE

## 2022-11-08 PROCEDURE — 96361 HYDRATE IV INFUSION ADD-ON: CPT

## 2022-11-08 PROCEDURE — G8400 PT W/DXA NO RESULTS DOC: HCPCS | Performed by: INTERNAL MEDICINE

## 2022-11-08 PROCEDURE — G8484 FLU IMMUNIZE NO ADMIN: HCPCS | Performed by: INTERNAL MEDICINE

## 2022-11-08 PROCEDURE — 2580000003 HC RX 258: Performed by: INTERNAL MEDICINE

## 2022-11-08 PROCEDURE — G8417 CALC BMI ABV UP PARAM F/U: HCPCS | Performed by: INTERNAL MEDICINE

## 2022-11-08 PROCEDURE — 96360 HYDRATION IV INFUSION INIT: CPT

## 2022-11-08 PROCEDURE — 1123F ACP DISCUSS/DSCN MKR DOCD: CPT | Performed by: INTERNAL MEDICINE

## 2022-11-08 PROCEDURE — G8427 DOCREV CUR MEDS BY ELIG CLIN: HCPCS | Performed by: INTERNAL MEDICINE

## 2022-11-08 PROCEDURE — 99214 OFFICE O/P EST MOD 30 MIN: CPT | Performed by: INTERNAL MEDICINE

## 2022-11-08 PROCEDURE — 3017F COLORECTAL CA SCREEN DOC REV: CPT | Performed by: INTERNAL MEDICINE

## 2022-11-08 PROCEDURE — 6360000002 HC RX W HCPCS: Performed by: INTERNAL MEDICINE

## 2022-11-08 RX ORDER — HEPARIN SODIUM (PORCINE) LOCK FLUSH IV SOLN 100 UNIT/ML 100 UNIT/ML
500 SOLUTION INTRAVENOUS PRN
OUTPATIENT
Start: 2022-11-08

## 2022-11-08 RX ORDER — HEPARIN SODIUM (PORCINE) LOCK FLUSH IV SOLN 100 UNIT/ML 100 UNIT/ML
500 SOLUTION INTRAVENOUS PRN
Status: DISCONTINUED | OUTPATIENT
Start: 2022-11-08 | End: 2022-11-09 | Stop reason: HOSPADM

## 2022-11-08 RX ORDER — 0.9 % SODIUM CHLORIDE 0.9 %
1000 INTRAVENOUS SOLUTION INTRAVENOUS ONCE
Status: COMPLETED | OUTPATIENT
Start: 2022-11-08 | End: 2022-11-08

## 2022-11-08 RX ORDER — SODIUM CHLORIDE 9 MG/ML
25 INJECTION, SOLUTION INTRAVENOUS PRN
OUTPATIENT
Start: 2022-11-08

## 2022-11-08 RX ORDER — SODIUM CHLORIDE 0.9 % (FLUSH) 0.9 %
5-40 SYRINGE (ML) INJECTION PRN
Status: DISCONTINUED | OUTPATIENT
Start: 2022-11-08 | End: 2022-11-09 | Stop reason: HOSPADM

## 2022-11-08 RX ORDER — SODIUM CHLORIDE 0.9 % (FLUSH) 0.9 %
5-40 SYRINGE (ML) INJECTION PRN
OUTPATIENT
Start: 2022-11-08

## 2022-11-08 RX ORDER — SODIUM CHLORIDE 9 MG/ML
25 INJECTION, SOLUTION INTRAVENOUS PRN
Status: CANCELLED | OUTPATIENT
Start: 2022-11-08

## 2022-11-08 RX ADMIN — SODIUM CHLORIDE 1000 ML: 9 INJECTION, SOLUTION INTRAVENOUS at 12:31

## 2022-11-08 RX ADMIN — HEPARIN 500 UNITS: 100 SYRINGE at 14:41

## 2022-11-08 RX ADMIN — SODIUM CHLORIDE, PRESERVATIVE FREE 10 ML: 5 INJECTION INTRAVENOUS at 14:41

## 2022-11-08 NOTE — PROGRESS NOTES
Met w/ pt and , Shari Badillo, during infusion per referral for diarrhea management. Pt w/ recent C-diff infection. She is having inconsistent diarrhea, starting over the weekend w/ more recent formed BM. Provided w/ resources for diarrhea control. Pt appreciative of assistance. All questions answered to pt's satisfaction. Encouraged to contact this clinician as needed.   Electronically signed by Paula Box, MS, RD, LD on 11/8/2022 at 2:10 PM

## 2022-11-08 NOTE — PROGRESS NOTES
701  [facility] MED ONCOLOGY  [address] [facility] 29. [id number]  Dept: 71 Kristi Kingsley: [phone]  Attending progress note      Reason for Visit:   Left breast cancer. Referring Physician: Dr. Sheng Bah. PCP:  Emy Saldaña MD    History of Present Illness:    Mrs. Madhuri Vázquez is a 64-year-old lady, with a past medical history significant for anxiety, hypothyroidism, and peripheral neuropathy, had presented with an abnormal screening mammogram from 5/24/2022, it had revealed new 3 6 mm mass in the upper outer left breast and 19 mm lymph node in the left axilla, on 5/27/2022 she underwent an ultrasound-guided left breast mass core biopsy, pathology had revealed invasive poorly differentiated carcinoma, grade 3, ER +80% AL +90% HER2/alex 2+ by IHC, positive by FISH, the patient had on 6/20/2022 CT scans of the chest, abdomen and pelvis done, revealing enlarged left axillary lymph node measuring just under 2 cm, no evidence of distant metastatic disease, she had a 2D echocardiogram done on 8/28/2022, revealing normal LVEF at 70%, patient was under the care of Dr. Sheng Bah, she received the first cycle of TCHP on 8/22/2022, the course was complicated by C. difficile infection which was diagnosed on 9/8/2022. She completed the course of oral vancomycin. The patient had just started to feel rare and like herself again, the diarrhea had resolved. The breast mass had decreased in size. The patient received Malon Lack on 10/31/2022. On 11/1/2022, the patient received the second cycle of chemotherapy, TCH, she did have diarrhea over the weekend and again last night and this morning. It is not watery. She is having brain fog and is unable to concentrate, and disequilibrium, which is very unusual for her, did not have this with the first treatment. Review of Systems;  CONSTITUTIONAL: No fever, chills. Decreased appetite and energy level.    ENMT: Eyes: No diplopia; Nose: No epistaxis. Mouth: No sore throat. RESPIRATORY: No hemoptysis, shortness of breath, cough. CARDIOVASCULAR: No chest pain, palpitations. GASTROINTESTINAL: No nausea/vomiting, abdominal pain, diarrhea/constipation. GENITOURINARY: No dysuria, urinary frequency, hematuria. NEURO: No syncope, presyncope, headache. Remainder:  ROS NEGATIVE    Past Medical History:      Diagnosis Date    Anxiety     Hypertension     Mitral valve prolapse     Thyroid disease      Patient Active Problem List   Diagnosis    Right cataract    Personal history of breast cancer    Recurrent Clostridium difficile diarrhea        Past Surgical History:      Procedure Laterality Date    CATARACT REMOVAL WITH IMPLANT Right 2020    HYSTERECTOMY (CERVIX STATUS UNKNOWN)      INTRACAPSULAR CATARACT EXTRACTION Right 2020    RIGHT EYE CATARACT EMULSIFICATION IOL IMPLANT performed by Sam Bianchi MD at [address] [place] [address]       Family History:  Family History   Problem Relation Age of Onset    Hypertension Mother     Heart Attack Father         26    Lung Cancer Brother 76    Heart Disease Brother        Medications:  Reviewed and reconciled.     Social History:  Social History     Socioeconomic History    Marital status:      Spouse name: Not on file    Number of children: Not on file    Years of education: Not on file    Highest education level: Not on file   Occupational History    Not on file   Tobacco Use    Smoking status: Former     Types: Cigarettes     Quit date: 1971     Years since quittin.8    Smokeless tobacco: Never   Vaping Use    Vaping Use: Never used   Substance and Sexual Activity    Alcohol use: Never    Drug use: Never    Sexual activity: Not on file   Other Topics Concern    Not on file   Social History Narrative    Not on file     Social Determinants of Health     Financial Resource Strain: Not on file   Food Insecurity: Not on file   Transportation Needs: Not on file   Physical Activity: Not on file   Stress: Not on file   Social Connections: Not on file   Intimate Partner Violence: Not on file   Housing Stability: Not on file       Allergies: Allergies   Allergen Reactions    Adhesive Tape Itching     rash    Medrol [Methylprednisolone] Other (See Comments)     States \" out of body experience\"    Codeine Nausea And Vomiting    Darvocet A500 [Propoxyphene N-Acetaminophen] Nausea And Vomiting    Sulfa Antibiotics Nausea And Vomiting       Physical Exam:  /60   Pulse 70   Temp 97.2 °F (36.2 °C) (Infrared)   Resp 16   Ht 5' 4\" (1.626 m)   Wt 171 lb (77.6 kg)   SpO2 98%   BMI 29.35 kg/m²   GENERAL: Alert, oriented x 3, not in acute distress. HEENT: PERRLA; EOMI. Oropharynx clear. NECK: Supple. No palpable cervical or supraclavicular lymphadenopathy. LUNGS: Good air entry bilaterally. No wheezing, crackles or rhonchi. CARDIOVASCULAR: Regular rate. No murmurs, rubs or gallops. BREASTS:  left breast exam is remarkable for decrease of the size of the mass. Fullness in the left axilla. ABDOMEN: Soft. Non-tender, non-distended. Positive bowel sounds. EXTREMITIES: Without clubbing, cyanosis, or edema. NEUROLOGIC: No focal deficits.      ECOG PS 1    Impression/Plan:     Mrs. Paula Hawthorne is a 47-year-old lady, with a past medical history significant for anxiety, hypothyroidism, and peripheral neuropathy, had presented with an abnormal screening mammogram from 5/24/2022, it had revealed new 36 mm mass in the upper outer left breast and 19 mm lymph node in the left axilla, on 5/27/2022 she underwent an ultrasound-guided left breast mass core biopsy, pathology had revealed invasive poorly differentiated carcinoma, grade 3, ER +80% VT +90% HER2/alex 2+ by IHC, positive by FISH, the patient had on 6/20/2022 CT scans of the chest, abdomen and pelvis done, revealing enlarged left axillary lymph node measuring just under 2 cm, no evidence of distant metastatic disease, she had a 2D echocardiogram done on 8/28/2022, revealing normal LVEF at 70%, patient was under the care of Dr. Juno Fitch, she received the first cycle of TCHP on 8/22/2022, the course was complicated by C. difficile infection which was diagnosed on 9/8/2022. She completed the course of oral vancomycin. The patient had just started to feel rare and like herself again, the diarrhea had resolved. The breast mass had decreased in size. Patient has T2 N1 M0 breast cancer, hormone receptor and HER2/alex positive, prognosis was discussed with the patient, amended to continue the neoadjuvant therapy, with the second cycle we will continue with QU[facility], if she does well we can reintroduce Perjeta with the subsequent cycles. Recommended evaluation by ID prior to restarting the treatment, the patient was seen by Georgina Valenzuela NP, was recommended Zinplava infusion, which she had completed. On 11/1/2020, the patient received [facility] with dose adjustment for better tolerance, we will plan on adding Perjeta for the subsequent cycles if she does well with this treatment. The patient is having diarrhea, but not watery, have C. difficile testing if it becomes watery. Labs reviewed. She will receive intravenous hydration. Brain MRI with and without contrast was ordered to rule out brain metastasis. Adjuvant endocrine therapy and radiation therapy will be recommended. She would like to have her surgery done with Dr. Loly Rodriguez. All her questions were answered to her apparent satisfaction,     Referred to palliative medicine to help with symptoms management. RTC in 2 weeks. Thank you for allowing us to participate in the care of Mrs. Merlinda Ree.     Danielle Sage MD   HEMATOLOGY/MEDICAL [address] [facility]  [address] MED ONCOLOGY  [address] [place] [id number]  Dept: 71 [facility]: [phone]

## 2022-11-11 ENCOUNTER — TELEPHONE (OUTPATIENT)
Dept: ONCOLOGY | Age: 72
End: 2022-11-11

## 2022-11-11 NOTE — TELEPHONE ENCOUNTER
Patient called related to unscheduled scans for next visit. Patient given central scheduling phone number to schedule.  Left voicemail

## 2022-11-18 ENCOUNTER — CLINICAL DOCUMENTATION (OUTPATIENT)
Dept: ONCOLOGY | Age: 72
End: 2022-11-18

## 2022-11-18 ENCOUNTER — TELEPHONE (OUTPATIENT)
Dept: ONCOLOGY | Age: 72
End: 2022-11-18

## 2022-11-18 DIAGNOSIS — T45.1X5D ADVERSE EFFECT OF CHEMOTHERAPY, SUBSEQUENT ENCOUNTER: Primary | ICD-10-CM

## 2022-11-18 DIAGNOSIS — Z85.3 PERSONAL HISTORY OF BREAST CANCER: Primary | ICD-10-CM

## 2022-11-18 NOTE — TELEPHONE ENCOUNTER
This nurse returned call to this patient related to MRI of the brain. Patient did not want contrast. Per Dr. Willaim Frankel, ok to order MRI without contrast, order being faxed to [facility] per . Patient verbalized understanding.

## 2022-11-18 NOTE — PROGRESS NOTES
Spoke with patient on the phone related to scans. Patient stated that she does not want chemo treatment until her scans have been done and requested to cancel appointment for Tuesday.  notified to cancel.

## 2022-11-21 ENCOUNTER — HOSPITAL ENCOUNTER (OUTPATIENT)
Dept: INFUSION THERAPY | Age: 72
End: 2022-11-21

## 2022-11-22 ENCOUNTER — HOSPITAL ENCOUNTER (OUTPATIENT)
Dept: INFUSION THERAPY | Age: 72
End: 2022-11-22

## 2022-11-30 DIAGNOSIS — C50.012 MALIGNANT NEOPLASM OF NIPPLE OF LEFT BREAST IN FEMALE, UNSPECIFIED ESTROGEN RECEPTOR STATUS (HCC): Primary | ICD-10-CM

## 2022-11-30 DIAGNOSIS — Z51.81 ENCOUNTER FOR MONITORING CARDIOTOXIC DRUG THERAPY: ICD-10-CM

## 2022-11-30 DIAGNOSIS — Z79.899 ENCOUNTER FOR MONITORING CARDIOTOXIC DRUG THERAPY: ICD-10-CM

## 2022-12-01 ENCOUNTER — TELEPHONE (OUTPATIENT)
Dept: CASE MANAGEMENT | Age: 72
End: 2022-12-01

## 2022-12-01 NOTE — TELEPHONE ENCOUNTER
Called patient re: Patient previously was to have cycle 3 of treatment but would not schedule until scans were done. Patient has had MRI at [place] [address] on 11-25-22. Left message with my contact information for patient to return my call for follow up.

## 2022-12-02 ENCOUNTER — TELEPHONE (OUTPATIENT)
Dept: CASE MANAGEMENT | Age: 72
End: 2022-12-02

## 2022-12-02 NOTE — TELEPHONE ENCOUNTER
Return patient call. Left my contact information for patient to return my call re: patient needs scheduled for treatment.

## 2022-12-02 NOTE — TELEPHONE ENCOUNTER
Patient returned my call. She states that she would like to wait until after the holiday's to resume treatment. I encouraged patient to come in to discuss with Dr Anibal Urias her plan of care. Patient is agreeable. Instructed patient to come in Dec 6 th at 1:45. Will notify front office of scheduling.

## 2022-12-06 ENCOUNTER — OFFICE VISIT (OUTPATIENT)
Dept: ONCOLOGY | Age: 72
End: 2022-12-06
Payer: MEDICARE

## 2022-12-06 ENCOUNTER — HOSPITAL ENCOUNTER (OUTPATIENT)
Dept: INFUSION THERAPY | Age: 72
Discharge: HOME | End: 2022-12-06

## 2022-12-06 VITALS
TEMPERATURE: 97.6 F | SYSTOLIC BLOOD PRESSURE: 125 MMHG | WEIGHT: 174 LBS | HEIGHT: 64 IN | BODY MASS INDEX: 29.71 KG/M2 | DIASTOLIC BLOOD PRESSURE: 58 MMHG | HEART RATE: 71 BPM | OXYGEN SATURATION: 97 %

## 2022-12-06 DIAGNOSIS — Z85.3 PERSONAL HISTORY OF BREAST CANCER: Primary | ICD-10-CM

## 2022-12-06 PROCEDURE — 99213 OFFICE O/P EST LOW 20 MIN: CPT | Performed by: INTERNAL MEDICINE

## 2022-12-06 NOTE — PROGRESS NOTES
705  [facility] ONCOLOGY  [phone] [facility] 29. [phone]  Dept: Jani Kingsley: [phone]  Attending progress note      Reason for Visit:   Left breast cancer. Referring Physician: Dr. Maia Ivan. PCP:  Patrica Lagos MD    History of Present Illness:    Mrs. Kamran Gar is a 78-year-old lady, with a past medical history significant for anxiety, hypothyroidism, and peripheral neuropathy, had presented with an abnormal screening mammogram from 5/24/2022, it had revealed new 3 6 mm mass in the upper outer left breast and 19 mm lymph node in the left axilla, on 5/27/2022 she underwent an ultrasound-guided left breast mass core biopsy, pathology had revealed invasive poorly differentiated carcinoma, grade 3, ER +80% MN +90% HER2/alex 2+ by IHC, positive by FISH, the patient had on 6/20/2022 CT scans of the chest, abdomen and pelvis done, revealing enlarged left axillary lymph node measuring just under 2 cm, no evidence of distant metastatic disease, she had a 2D echocardiogram done on 8/28/2022, revealing normal LVEF at 70%, patient was under the care of Dr. Maia Ivan, she received the first cycle of TCHP on 8/22/2022, the course was complicated by C. difficile infection which was diagnosed on 9/8/2022. She completed the course of oral vancomycin. The patient had just started to feel rare and like herself again, the diarrhea had resolved. The breast mass had decreased in size. The patient received Tom Anchors on 10/31/2022. On 11/1/2022, the patient received the second cycle of chemotherapy, [address], the patient was having brain fog after the chemotherapy, MRI was ordered, was done at [facility], the brain fog had significantly improved, no fever. No other neurological symptoms. Review of Systems;  CONSTITUTIONAL: No fever, chills. Decreased appetite and energy level. ENMT: Eyes: No diplopia; Nose: No epistaxis. Mouth: No sore throat.   RESPIRATORY: No hemoptysis, shortness of breath, cough. CARDIOVASCULAR: No chest pain, palpitations. GASTROINTESTINAL: No nausea/vomiting, abdominal pain, diarrhea/constipation. GENITOURINARY: No dysuria, urinary frequency, hematuria. NEURO: No syncope, presyncope, headache. Remainder:  ROS NEGATIVE    Past Medical History:      Diagnosis Date    Anxiety     Hypertension     Mitral valve prolapse     Thyroid disease      Patient Active Problem List   Diagnosis    Right cataract    Personal history of breast cancer    Recurrent Clostridium difficile diarrhea        Past Surgical History:      Procedure Laterality Date    CATARACT REMOVAL WITH IMPLANT Right 2020    HYSTERECTOMY (CERVIX STATUS UNKNOWN)      INTRACAPSULAR CATARACT EXTRACTION Right 2020    RIGHT EYE CATARACT EMULSIFICATION IOL IMPLANT performed by Yamileth Knight MD at [address] [place] [address]       Family History:  Family History   Problem Relation Age of Onset    Hypertension Mother     Heart Attack Father         26    Lung Cancer Brother 76    Heart Disease Brother        Medications:  Reviewed and reconciled.     Social History:  Social History     Socioeconomic History    Marital status:      Spouse name: Not on file    Number of children: Not on file    Years of education: Not on file    Highest education level: Not on file   Occupational History    Not on file   Tobacco Use    Smoking status: Former     Types: Cigarettes     Quit date: 1971     Years since quittin.9    Smokeless tobacco: Never   Vaping Use    Vaping Use: Never used   Substance and Sexual Activity    Alcohol use: Never    Drug use: Never    Sexual activity: Not on file   Other Topics Concern    Not on file   Social History Narrative    Not on file     Social Determinants of Health     Financial Resource Strain: Not on file   Food Insecurity: Not on file   Transportation Needs: Not on file   Physical Activity: Not on file   Stress: Not on file   Social Connections: Not on file   Intimate Partner Violence: Not on file   Housing Stability: Not on file       Allergies: Allergies   Allergen Reactions    Adhesive Tape Itching     rash    Medrol [Methylprednisolone] Other (See Comments)     States \" out of body experience\"    Codeine Nausea And Vomiting    Darvocet A500 [Propoxyphene N-Acetaminophen] Nausea And Vomiting    Sulfa Antibiotics Nausea And Vomiting       Physical Exam:  BP (!) 125/58   Pulse 71   Temp 97.6 °F (36.4 °C)   Ht 5' 4\" (1.626 m)   Wt 174 lb (78.9 kg)   SpO2 97%   BMI 29.87 kg/m²   GENERAL: Alert, oriented x 3, not in acute distress. HEENT: PERRLA; EOMI. Oropharynx clear. NECK: Supple. No palpable cervical or supraclavicular lymphadenopathy. No neck rigidity. LUNGS: Good air entry bilaterally. No wheezing, crackles or rhonchi. CARDIOVASCULAR: Regular rate. No murmurs, rubs or gallops. BREASTS:  left breast exam is remarkable for decrease of the size of the mass. Fullness in the left axilla. ABDOMEN: Soft. Non-tender, non-distended. Positive bowel sounds. EXTREMITIES: Without clubbing, cyanosis, or edema. NEUROLOGIC: No focal deficits.      ECOG PS 1    Impression/Plan:     Mrs. Tigist Mcarthur is a 75-year-old lady, with a past medical history significant for anxiety, hypothyroidism, and peripheral neuropathy, had presented with an abnormal screening mammogram from 5/24/2022, it had revealed new 36 mm mass in the upper outer left breast and 19 mm lymph node in the left axilla, on 5/27/2022 she underwent an ultrasound-guided left breast mass core biopsy, pathology had revealed invasive poorly differentiated carcinoma, grade 3, ER +80% MN +90% HER2/alex 2+ by IHC, positive by FISH, the patient had on 6/20/2022 CT scans of the chest, abdomen and pelvis done, revealing enlarged left axillary lymph node measuring just under 2 cm, no evidence of distant metastatic disease, she had a 2D echocardiogram done on 8/28/2022, revealing normal LVEF at 70%, patient was under the care of Dr. Shalonad Myles, she received the first cycle of TCHP on 8/22/2022, the course was complicated by C. difficile infection which was diagnosed on 9/8/2022. She completed the course of oral vancomycin. The patient had just started to feel rare and like herself again, the diarrhea had resolved. The breast mass had decreased in size. Patient has T2 N1 M0 breast cancer, hormone receptor and HER2/alex positive, prognosis was discussed with the patient, amended to continue the neoadjuvant therapy, with the second cycle we will continue with [facility], if she does well we can reintroduce Perjeta with the subsequent cycles. Recommended evaluation by ID prior to restarting the treatment, the patient was seen by Amanda Calixto NP, was recommended Zinplava infusion, which she had completed. On 11/1/2020, the patient received [facility] with dose adjustment for better tolerance, with a plan to add Perjeta for the subsequent cycles if she does well with this treatment. the patient was having brain fog after the chemotherapy, MRI was ordered, was done at [facility], the brain fog had significantly improved, no fever. No other neurological symptoms. Brain MRI was done on 11/25/2022, revealing diffuse wall thickening and FLAIR hyperintensity of the dura, with progression, differential diagnoses include but are not limited to intracranial hypertension, meningitis, neurosarcoidosis, lymphoma, hypertrophic pachymeningitis, meningeal metastasis and postoperative reaction, referral was placed to neuro and neurosurgery for evaluation. We discussed that she has any neurological symptoms to go to the ED. the patient does not want any treatment until after the holidays, discussed with her possibly doing Herceptin or her steroid burst or without chemotherapy at least for 1 cycle, she does not want any treatment until after the holidays. She will let me know if she changes her mind.   Clinically she is responding well to the treatment, with significant decrease in the left breast mass. Adjuvant endocrine therapy and radiation therapy will be recommended. She would like to have her surgery done with Dr. Clarke Wells. All her questions were answered to her apparent satisfaction. Referred to palliative medicine to help with symptoms management. RTC in 3 weeks. Thank you for allowing us to participate in the care of Mrs. Felton Luz.     Danielle Gauthier MD   HEMATOLOGY/MEDICAL [address] [place] [address] [place] MED ONCOLOGY  [address] [place] [id number]  Dept: 71 [facility]: [phone]

## 2022-12-08 ENCOUNTER — TELEPHONE (OUTPATIENT)
Dept: CASE MANAGEMENT | Age: 72
End: 2022-12-08

## 2022-12-08 NOTE — TELEPHONE ENCOUNTER
Attempted to return patient call. Left VM with my contact information for patient to return my call.

## 2022-12-15 ENCOUNTER — OFFICE VISIT (OUTPATIENT)
Dept: NEUROLOGY | Age: 72
End: 2022-12-15
Payer: MEDICARE

## 2022-12-15 VITALS
WEIGHT: 180 LBS | SYSTOLIC BLOOD PRESSURE: 125 MMHG | HEIGHT: 64 IN | HEART RATE: 65 BPM | TEMPERATURE: 98 F | DIASTOLIC BLOOD PRESSURE: 65 MMHG | OXYGEN SATURATION: 99 % | BODY MASS INDEX: 30.73 KG/M2

## 2022-12-15 DIAGNOSIS — R90.89 LEPTOMENINGEAL ENHANCEMENT ON MRI OF BRAIN: Primary | ICD-10-CM

## 2022-12-15 PROCEDURE — 99205 OFFICE O/P NEW HI 60 MIN: CPT | Performed by: PHYSICIAN ASSISTANT

## 2022-12-15 PROCEDURE — 1036F TOBACCO NON-USER: CPT | Performed by: PHYSICIAN ASSISTANT

## 2022-12-15 PROCEDURE — 1090F PRES/ABSN URINE INCON ASSESS: CPT | Performed by: PHYSICIAN ASSISTANT

## 2022-12-15 PROCEDURE — 1123F ACP DISCUSS/DSCN MKR DOCD: CPT | Performed by: PHYSICIAN ASSISTANT

## 2022-12-15 PROCEDURE — G8484 FLU IMMUNIZE NO ADMIN: HCPCS | Performed by: PHYSICIAN ASSISTANT

## 2022-12-15 PROCEDURE — G8417 CALC BMI ABV UP PARAM F/U: HCPCS | Performed by: PHYSICIAN ASSISTANT

## 2022-12-15 PROCEDURE — 3017F COLORECTAL CA SCREEN DOC REV: CPT | Performed by: PHYSICIAN ASSISTANT

## 2022-12-15 PROCEDURE — G8400 PT W/DXA NO RESULTS DOC: HCPCS | Performed by: PHYSICIAN ASSISTANT

## 2022-12-15 PROCEDURE — G8427 DOCREV CUR MEDS BY ELIG CLIN: HCPCS | Performed by: PHYSICIAN ASSISTANT

## 2022-12-15 NOTE — PROGRESS NOTES
[facility] Neurology   Office visit- new patient     Date:  12/15/2022  Patient Name:  Bruce Davenport  YOB: 1950  MRN: [id number]     PCP:  Lester Nolasco MD   Referring:  Nancy Lamas MD      Chief Complaint: Abnormal brain MRI     History obtained from: patient and chart     [address] is a 74 y.o. female with history of breast cancer diagnosed 5/2022 undergoing chemotherapy treatments who had a recent brain MRI without contrast in 11/2022 due to brain fog. Her symptoms have improved, however MRI of the jess did show diffuse smooth thickening and Flair hyperintensity of the dura with progression. 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 Contrasted images are needed to narrow the differential as well as LP for diagnosis     Thankfully, her neuro exam is unremarkable at this time     Discussed with the patient and her  extensively the need for additional testing for diagnosis as the diagnosis has the potential to change her current treatment plan. Plan  MRI brain W contrast- patient reports allergy to gadolinium in the past (unsure what the exact reaction was, reported heart racing and issues with MVP). Would recommend pretreatment with Steroids, benadryl, Tylenol prior to scan. If wavier is needed, would be willing to sign as the benefit of diagnosis with potential to change treatment plan outweighs risk   Would highly recommend LP- patient very hesitant to this. With measurement of opening pressure, routine studies, meningitis panel, and cytology, flow cytometry   Patient does not want any testing prior to the new year. RTO in January    History of Present Illness:  Bruce Davenport is a 74 y.o. right handed female presenting for evaluation of abnormal brain MRI. Patient was recently diagnosed with breast cancer in May 2022.   Pathology revealed invasive poorly differentiated carcinoma and additional scans revealed enlarged left axillary lymph node but no evidence of distant metastatic disease. She underwent first cycle of TCHP in August 2022. This was complicated by C. difficile infection. She was seen by ID and completed a course of abx. In November 2022 she complained of brain fog and and MRI brain was completed which showed diffuse dural thickening and FLAIR hyperintensity with progression with differential including but not limited to intracranial hypotension, meningitis, neurosarcoidosis, lymphoma, hypertrophic pachymeningitis, meningeal metastasis and postoperative reaction. She was referred here for further evaluation prior to starting her next round of chemotherapy. Patient reports that she previously had a reaction to MRI contrast.  Stating that it caused her heart to race and cause difficulties related to her mitral valve prolapse. We discussed the importance of obtaining contrasted images and she was agreeable to doing this with premedication. Due to claustrophobia she requested open MRI as well. We also discussed the need for a lumbar puncture. She is not interested in this despite discussing the importance of the test.  I also discussed her case with Dr. Rubi Berumen who agreed that LP with sending for cytology especially given her history is indicated.        Review of Systems:  No chest pain or palpitations  No SOB  No vertigo, lightheadedness or loss of consciousness  No falls, tripping or stumbling  No incontinence of bowels or bladder  No itching or bruising appreciated  No numbness, tingling or focal arm/leg weakness    ROS otherwise negative      Medical History:   Past Medical History:   Diagnosis Date    Anxiety     Hypertension     Mitral valve prolapse     Thyroid disease         Surgical History:   Past Surgical History:   Procedure Laterality Date    CATARACT REMOVAL WITH IMPLANT Right 06/23/2020    HYSTERECTOMY (CERVIX STATUS UNKNOWN)      INTRACAPSULAR CATARACT EXTRACTION Right 6/23/2020    RIGHT EYE CATARACT EMULSIFICATION IOL IMPLANT performed by Amanda Nayak MD at [address] [place] [address]        Family History:   Family History   Problem Relation Age of Onset    Hypertension Mother     Heart Attack Father         26    Lung Cancer Brother 76    Heart Disease Brother        Social History:  Social History     Tobacco Use    Smoking status: Former     Types: Cigarettes     Quit date: 1971     Years since quittin.9    Smokeless tobacco: Never   Vaping Use    Vaping Use: Never used   Substance Use Topics    Alcohol use: Never    Drug use: Never        Current Medications:      Current Outpatient Medications   Medication Sig Dispense Refill    lidocaine viscous hcl-diphenhydrAMINE-nystatin SWISH AND SPIT 10 milliliters by mouth four times a day      amitriptyline (ELAVIL) 10 MG tablet Take 10 mg by mouth nightly Takes 6 tablets      propranolol (INDERAL) 10 MG tablet Take 10 mg by mouth 3 times daily. ARMOUR THYROID PO Take 60 mg by mouth daily       LISINOPRIL PO Take 20 mg by mouth daily 3pm      ALPRAZolam (XANAX) 0.5 MG tablet Take 0.5 mg by mouth nightly as needed for Sleep.      traMADol (ULTRAM) 50 MG tablet Take 50 mg by mouth nightly. Pregabalin (LYRICA PO) Take 50 mg by mouth nightly        No current facility-administered medications for this visit. Allergies: Allergies   Allergen Reactions    Adhesive Tape Itching     rash    Medrol [Methylprednisolone] Other (See Comments)     States \" out of body experience\"    Codeine Nausea And Vomiting    Darvocet A500 [Propoxyphene N-Acetaminophen] Nausea And Vomiting    Sulfa Antibiotics Nausea And Vomiting        Physical Examination  Vitals   Vitals:    12/15/22 0950   BP: 125/65   Pulse: 65   Temp: 98 °F (36.7 °C)   TempSrc: Temporal   SpO2: 99%   Weight: 180 lb (81.6 kg)   Height: 5' 4\" (1.626 m)        General: Patient appears in no acute distress.    HEENT: Normocephalic, atraumatic  Chest: effort normal   Extremities/Peripheral vascular: No edema/swelling noted. No cold limbs noted. Neurologic Examination    Mental Status  Alert, and oriented to person, place and time. Speech is fluent with intact comprehension. No evidence of memory impairment. Attention and concentration appeared normal.     Cranial Nerves  II. Visual fields full to confrontation bilaterally. III, IV, VI: Pupils equally round and reactive to light, 3 to 2 mm bilaterally. EOMs: full, no nystagmus. V. Facial sensation intact to light touch bilaterally  VII: Facial movements symmetric and strong  VIII: Hearing intact to voice  IX,X: Palate elevates symmetrically.  No dysarthria  XI: Sternocleidomastoid and trapezius 5/5 bilaterally   XII: Tongue is midline    Motor     Right Left   Right Left   Deltoid 5 5  Hip Flexion 5 5   Biceps 5 5  Knee Extension 5 5   Triceps 5 5  Knee Flexion 5 5   Handgrip 5 5  Ankle Dorsiflexion 5 5       Ankle Plantarflexion 5 5     Tone: Normal in all four limbs    Bulk: Normal in all four limbs with no evidence of atrophy    Pronator drift: absent bilaterally    Sensation  Light Touch: Intact distally in all four limbs  Vibration: Intact distally in all four limbs    Reflexes     Right Left   Biceps 2 2   Brachioradialis 2 2   Triceps 2 2   Patellar 2 2   Achilles 2 2   ankle clonus none none   Babinski absent absent     Coordination  Rapid alternating movements normal in bilateral upper extremities  Finger to nose testing normal bilaterally  Heel to shin testing normal bilaterally    Gait  Normal     Labs  Lab Results   Component Value Date    WBC 9.8 11/07/2022    HGB 13.3 11/07/2022    HCT 39.6 11/07/2022    MCV 91.9 11/07/2022     11/07/2022     Lab Results   Component Value Date     11/07/2022    K 4.3 11/07/2022     11/07/2022    CO2 27 11/07/2022    BUN 6 11/07/2022    CREATININE 0.8 11/07/2022    GLUCOSE 133 (H) 11/07/2022    CALCIUM 9.7 11/07/2022    PROT 6.5 11/07/2022    LABALBU 4.0 11/07/2022    BILITOT 0.3 11/07/2022    ALKPHOS 144 (H) 11/07/2022    AST 19 11/07/2022    ALT 13 11/07/2022    LABGLOM >60 11/07/2022     Imaging    MRI brain WO   Diffuse, smooth thickening and FLAIR hyperintensity of the dura with   progression. Differential considerations include but are not limited to   intracranial hypotension, meningitis, neurosarcoidosis, lymphoma,   hypertrophic pachymeningitis, meningeal metastasis, and postoperative   reaction. Correlation with contrast enhanced brain MRI recommended to   further evaluate. Similar minimal chronic ischemic changes in the supratentorial white   matter.      I have personally reviewed the following images: MRI brain       Electronically signed by KENA Fernandez on 12/15/2022 at 9:59 AM

## 2022-12-15 NOTE — Clinical Note
Mo Butterfield,   I saw Mrs. Tigist Mcarthur today in clinic. After review of her MRI brain WO contrast, highly recommend contrasted images as well as LP for diagnosis. Concerned for meningeal metastasis. She is reluctant to have these completed. Please see my attached note. Let me know if you have any questions or concerns. Thank you!  Michael Talavera

## 2022-12-19 ENCOUNTER — OFFICE VISIT (OUTPATIENT)
Dept: FAMILY MEDICINE CLINIC | Age: 72
End: 2022-12-19
Payer: MEDICARE

## 2022-12-19 VITALS
TEMPERATURE: 97.5 F | BODY MASS INDEX: 29.53 KG/M2 | SYSTOLIC BLOOD PRESSURE: 110 MMHG | OXYGEN SATURATION: 100 % | RESPIRATION RATE: 20 BRPM | HEART RATE: 90 BPM | WEIGHT: 173 LBS | HEIGHT: 64 IN | DIASTOLIC BLOOD PRESSURE: 74 MMHG

## 2022-12-19 DIAGNOSIS — R05.9 COUGH, UNSPECIFIED TYPE: ICD-10-CM

## 2022-12-19 DIAGNOSIS — U07.1 COVID-19: Primary | ICD-10-CM

## 2022-12-19 LAB
Lab: ABNORMAL
PERFORMING INSTRUMENT: ABNORMAL
QC PASS/FAIL: ABNORMAL
SARS-COV-2, POC: DETECTED

## 2022-12-19 PROCEDURE — 99213 OFFICE O/P EST LOW 20 MIN: CPT | Performed by: NURSE PRACTITIONER

## 2022-12-19 PROCEDURE — 3017F COLORECTAL CA SCREEN DOC REV: CPT | Performed by: NURSE PRACTITIONER

## 2022-12-19 PROCEDURE — 1036F TOBACCO NON-USER: CPT | Performed by: NURSE PRACTITIONER

## 2022-12-19 PROCEDURE — 87426 SARSCOV CORONAVIRUS AG IA: CPT | Performed by: NURSE PRACTITIONER

## 2022-12-19 PROCEDURE — 1123F ACP DISCUSS/DSCN MKR DOCD: CPT | Performed by: NURSE PRACTITIONER

## 2022-12-19 PROCEDURE — G8427 DOCREV CUR MEDS BY ELIG CLIN: HCPCS | Performed by: NURSE PRACTITIONER

## 2022-12-19 PROCEDURE — G8484 FLU IMMUNIZE NO ADMIN: HCPCS | Performed by: NURSE PRACTITIONER

## 2022-12-19 PROCEDURE — G8400 PT W/DXA NO RESULTS DOC: HCPCS | Performed by: NURSE PRACTITIONER

## 2022-12-19 PROCEDURE — G8417 CALC BMI ABV UP PARAM F/U: HCPCS | Performed by: NURSE PRACTITIONER

## 2022-12-19 PROCEDURE — 1090F PRES/ABSN URINE INCON ASSESS: CPT | Performed by: NURSE PRACTITIONER

## 2022-12-19 RX ORDER — NIRMATRELVIR AND RITONAVIR 300-100 MG
KIT ORAL
Qty: 1 EACH | Refills: 0 | Status: SHIPPED | OUTPATIENT
Start: 2022-12-19

## 2022-12-19 NOTE — PROGRESS NOTES
22  Cat Simpson : 1950 Sex: female  Age 74 y.o. Subjective:  Chief Complaint   Patient presents with    Cough     Started this morning with a headache. Pt seen PCP today, was swabbed for strep and flu, both negative. Pt stated she was told to come here to be swabbed for covid. Was not prescribed anything. HPI:   Cat Simpson , 74 y.o. female presents to the clinic for evaluation of cough x 2 days. The patient also reports headache, body aches and sore throat. The patient was seen at PCP today and tested for Strep (-) and Flu (-). The patient has not taken any treatment for symptoms. The patient reports unchanged symptoms over time. The patient reports COVID-19 ill exposure (son). The patient denies hx of COVID-19. The patient denies headache, sinus congestion, rash, and fever. The patient also denies chest pain, abdominal pain, shortness of breath, and nausea / vomiting / diarrhea. ROS:   Unless otherwise stated in this report the patient's positive and negative responses for review of systems for constitutional, eyes, ENT, cardiovascular, respiratory, gastrointestinal, neurological, , musculoskeletal, and integument systems and related systems to the presenting problem are either stated in the history of present illness or were not pertinent or were negative for the symptoms and/or complaints related to the presenting medical problem. Positives and pertinent negatives as per HPI. All others reviewed and are negative.       PMH:     Past Medical History:   Diagnosis Date    Anxiety     Hypertension     Mitral valve prolapse     Thyroid disease        Past Surgical History:   Procedure Laterality Date    CATARACT REMOVAL WITH IMPLANT Right 2020    HYSTERECTOMY (CERVIX STATUS UNKNOWN)      INTRACAPSULAR CATARACT EXTRACTION Right 2020    RIGHT EYE CATARACT EMULSIFICATION IOL IMPLANT performed by Frieda Magaña MD at [address] [place] [address]       Family History   Problem Relation Age of Onset    Hypertension Mother     Heart Attack Father         26    Lung Cancer Brother 76    Heart Disease Brother        Medications:     Current Outpatient Medications:     nirmatrelvir/ritonavir (PAXLOVID, 300/100,) 20 x 150 MG & 10 x 100MG TBPK, Nirmatrelvir 300 mg with ritonavir 100 mg, administered together, twice daily by mouth for 5 days. , Disp: 1 each, Rfl: 0    lidocaine viscous hcl-diphenhydrAMINE-nystatin, SWISH AND SPIT 10 milliliters by mouth four times a day, Disp: , Rfl:     amitriptyline (ELAVIL) 10 MG tablet, Take 10 mg by mouth nightly Takes 6 tablets, Disp: , Rfl:     propranolol (INDERAL) 10 MG tablet, Take 10 mg by mouth 3 times daily. , Disp: , Rfl:     ARMOUR THYROID PO, Take 60 mg by mouth daily , Disp: , Rfl:     LISINOPRIL PO, Take 20 mg by mouth daily 3pm, Disp: , Rfl:     ALPRAZolam (XANAX) 0.5 MG tablet, Take 0.5 mg by mouth nightly as needed for Sleep., Disp: , Rfl:     traMADol (ULTRAM) 50 MG tablet, Take 50 mg by mouth nightly., Disp: , Rfl:     Pregabalin (LYRICA PO), Take 50 mg by mouth nightly , Disp: , Rfl:     Allergies:      Allergies   Allergen Reactions    Adhesive Tape Itching     rash    Medrol [Methylprednisolone] Other (See Comments)     States \" out of body experience\"    Codeine Nausea And Vomiting    Darvocet A500 [Propoxyphene N-Acetaminophen] Nausea And Vomiting    Sulfa Antibiotics Nausea And Vomiting       Social History:     Social History     Tobacco Use    Smoking status: Former     Types: Cigarettes     Quit date: 1971     Years since quittin.0    Smokeless tobacco: Never   Vaping Use    Vaping Use: Never used   Substance Use Topics    Alcohol use: Never    Drug use: Never       Physical Exam:     Vitals:    22 1511   BP: 110/74   Site: Left Upper Arm   Position: Sitting   Cuff Size: Medium Adult   Pulse: 90   Resp: 20   Temp: 97.5 °F (36.4 °C)   TempSrc: Temporal   SpO2: 100%   Weight: 173 lb (78.5 kg)   Height: 5' 4\" (1.626 m) Physical Exam (PE)    Physical Exam  Constitutional:       Appearance: Normal appearance. HENT:      Head: Normocephalic. Right Ear: Tympanic membrane, ear canal and external ear normal.      Left Ear: Ear canal and external ear normal.      Nose: Rhinorrhea present. Mouth/Throat:      Mouth: Mucous membranes are moist.      Pharynx: Oropharynx is clear. Eyes:      Pupils: Pupils are equal, round, and reactive to light. Cardiovascular:      Rate and Rhythm: Normal rate and regular rhythm. Pulses: Normal pulses. Heart sounds: Normal heart sounds. Pulmonary:      Effort: Pulmonary effort is normal.      Breath sounds: Normal breath sounds. No wheezing, rhonchi or rales. Abdominal:      General: Bowel sounds are normal.      Palpations: Abdomen is soft. Musculoskeletal:         General: Normal range of motion. Cervical back: Normal range of motion and neck supple. Lymphadenopathy:      Cervical: No cervical adenopathy. Skin:     General: Skin is warm and dry. Capillary Refill: Capillary refill takes less than 2 seconds. Neurological:      General: No focal deficit present. Mental Status: She is alert and oriented to person, place, and time. Psychiatric:         Mood and Affect: Mood normal.         Behavior: Behavior normal.        Testing:   (All laboratory and radiology results have been personally reviewed by myself)  Labs:  Results for orders placed or performed in visit on 12/19/22   POCT COVID-19, Antigen   Result Value Ref Range    SARS-COV-2, POC Detected (A) Not Detected    Lot Number [id number]     QC Pass/Fail Pass     Performing Instrument BD Veritor        Imaging: All Radiology results interpreted by Radiologist unless otherwise noted. No orders to display       Assessment / Plan:   The patient's vitals, allergies, medications, and past medical history have been reviewed. Magdalena Harrington was seen today for cough.     Diagnoses and all orders for this visit:    COVID-19  -     nirmatrelvir/ritonavir (PAXLOVID, 300/100,) 20 x 150 MG & 10 x 100MG TBPK; Nirmatrelvir 300 mg with ritonavir 100 mg, administered together, twice daily by mouth for 5 days. Cough, unspecified type  -     POCT COVID-19, Antigen      - Disposition: Home    - Educational material printed for patient's review and were included in patient instructions. After Visit Summary was given to patient at the end of visit. - Advised to follow CDC guidelines. Encouraged oral fluids and rest. Discussed symptomatic treatments with patient today. The patient is to follow-up with PCP in the next 2-3 days for reevaluation. Red flag symptoms were also discussed with the patient today. If symptoms worsen the patient is to go directly to the emergency department for reevaluation and treatment. Pt verbalizes understanding and is in agreement with plan of care. All questions answered. SIGNATURE: LUCY Sullivan-CNP    *NOTE: This report was transcribed using voice recognition software. Every effort was made to ensure accuracy; however, inadvertent computerized transcription errors may be present.

## 2023-01-06 ENCOUNTER — HOSPITAL ENCOUNTER (OUTPATIENT)
Dept: INFUSION THERAPY | Age: 73
Discharge: HOME | End: 2023-01-06
Payer: MEDICARE

## 2023-01-06 ENCOUNTER — OFFICE VISIT (OUTPATIENT)
Dept: ONCOLOGY | Age: 73
End: 2023-01-06
Payer: MEDICARE

## 2023-01-06 VITALS
TEMPERATURE: 97.7 F | DIASTOLIC BLOOD PRESSURE: 65 MMHG | BODY MASS INDEX: 30.39 KG/M2 | HEIGHT: 64 IN | SYSTOLIC BLOOD PRESSURE: 143 MMHG | HEART RATE: 76 BPM | OXYGEN SATURATION: 98 % | WEIGHT: 178 LBS

## 2023-01-06 DIAGNOSIS — Z85.3 PERSONAL HISTORY OF BREAST CANCER: ICD-10-CM

## 2023-01-06 DIAGNOSIS — Z85.3 PERSONAL HISTORY OF BREAST CANCER: Primary | ICD-10-CM

## 2023-01-06 LAB
ALBUMIN SERPL-MCNC: 3.8 G/DL (ref 3.5–5.2)
ALP BLD-CCNC: 111 U/L (ref 35–104)
ALT SERPL-CCNC: 7 U/L (ref 0–32)
ANION GAP SERPL CALCULATED.3IONS-SCNC: 9 MMOL/L (ref 7–16)
AST SERPL-CCNC: 14 U/L (ref 0–31)
BASOPHILS ABSOLUTE: 0.04 E9/L (ref 0–0.2)
BASOPHILS RELATIVE PERCENT: 0.8 % (ref 0–2)
BILIRUB SERPL-MCNC: 0.4 MG/DL (ref 0–1.2)
BUN BLDV-MCNC: 8 MG/DL (ref 6–23)
CALCIUM SERPL-MCNC: 9.8 MG/DL (ref 8.6–10.2)
CHLORIDE BLD-SCNC: 105 MMOL/L (ref 98–107)
CO2: 27 MMOL/L (ref 22–29)
CREAT SERPL-MCNC: 0.7 MG/DL (ref 0.5–1)
EOSINOPHILS ABSOLUTE: 0.12 E9/L (ref 0.05–0.5)
EOSINOPHILS RELATIVE PERCENT: 2.3 % (ref 0–6)
GFR SERPL CREATININE-BSD FRML MDRD: >60 ML/MIN/1.73
GLUCOSE BLD-MCNC: 125 MG/DL (ref 74–99)
HCT VFR BLD CALC: 35.7 % (ref 34–48)
HEMOGLOBIN: 12 G/DL (ref 11.5–15.5)
IMMATURE GRANULOCYTES #: 0.01 E9/L
IMMATURE GRANULOCYTES %: 0.2 % (ref 0–5)
LYMPHOCYTES ABSOLUTE: 1.47 E9/L (ref 1.5–4)
LYMPHOCYTES RELATIVE PERCENT: 28.2 % (ref 20–42)
MAGNESIUM: 2.1 MG/DL (ref 1.6–2.6)
MCH RBC QN AUTO: 30 PG (ref 26–35)
MCHC RBC AUTO-ENTMCNC: 33.6 % (ref 32–34.5)
MCV RBC AUTO: 89.3 FL (ref 80–99.9)
MONOCYTES ABSOLUTE: 0.58 E9/L (ref 0.1–0.95)
MONOCYTES RELATIVE PERCENT: 11.1 % (ref 2–12)
NEUTROPHILS ABSOLUTE: 3 E9/L (ref 1.8–7.3)
NEUTROPHILS RELATIVE PERCENT: 57.4 % (ref 43–80)
PDW BLD-RTO: 13.1 FL (ref 11.5–15)
PLATELET # BLD: 160 E9/L (ref 130–450)
PMV BLD AUTO: 9.3 FL (ref 7–12)
POTASSIUM SERPL-SCNC: 4.3 MMOL/L (ref 3.5–5)
RBC # BLD: 4 E12/L (ref 3.5–5.5)
SODIUM BLD-SCNC: 141 MMOL/L (ref 132–146)
TOTAL PROTEIN: 6.6 G/DL (ref 6.4–8.3)
WBC # BLD: 5.2 E9/L (ref 4.5–11.5)

## 2023-01-06 PROCEDURE — 83735 ASSAY OF MAGNESIUM: CPT

## 2023-01-06 PROCEDURE — 85025 COMPLETE CBC W/AUTO DIFF WBC: CPT

## 2023-01-06 PROCEDURE — 99212 OFFICE O/P EST SF 10 MIN: CPT

## 2023-01-06 PROCEDURE — G8417 CALC BMI ABV UP PARAM F/U: HCPCS | Performed by: INTERNAL MEDICINE

## 2023-01-06 PROCEDURE — 36415 COLL VENOUS BLD VENIPUNCTURE: CPT

## 2023-01-06 PROCEDURE — 80053 COMPREHEN METABOLIC PANEL: CPT

## 2023-01-06 PROCEDURE — 1090F PRES/ABSN URINE INCON ASSESS: CPT | Performed by: INTERNAL MEDICINE

## 2023-01-06 PROCEDURE — 99214 OFFICE O/P EST MOD 30 MIN: CPT | Performed by: INTERNAL MEDICINE

## 2023-01-06 PROCEDURE — 3017F COLORECTAL CA SCREEN DOC REV: CPT | Performed by: INTERNAL MEDICINE

## 2023-01-06 PROCEDURE — G8484 FLU IMMUNIZE NO ADMIN: HCPCS | Performed by: INTERNAL MEDICINE

## 2023-01-06 PROCEDURE — G8427 DOCREV CUR MEDS BY ELIG CLIN: HCPCS | Performed by: INTERNAL MEDICINE

## 2023-01-06 PROCEDURE — G8400 PT W/DXA NO RESULTS DOC: HCPCS | Performed by: INTERNAL MEDICINE

## 2023-01-06 PROCEDURE — 1036F TOBACCO NON-USER: CPT | Performed by: INTERNAL MEDICINE

## 2023-01-06 PROCEDURE — 1123F ACP DISCUSS/DSCN MKR DOCD: CPT | Performed by: INTERNAL MEDICINE

## 2023-01-06 NOTE — PROGRESS NOTES
[address]  [place] MED ONCOLOGY  [address] [facility] 29. [address]  Dept: Jani Kristi Kingsley: [phone]  Attending progress note      Reason for Visit:   Left breast cancer. Referring Physician: Dr. Harvie Dandy. PCP:  Anette Kirkpatrick MD    History of Present Illness:    Mrs. Jayson Rosales is a 68-year-old lady, with a past medical history significant for anxiety, hypothyroidism, and peripheral neuropathy, had presented with an abnormal screening mammogram from 5/24/2022, it had revealed new 3 6 mm mass in the upper outer left breast and 19 mm lymph node in the left axilla, on 5/27/2022 she underwent an ultrasound-guided left breast mass core biopsy, pathology had revealed invasive poorly differentiated carcinoma, grade 3, ER +80% NC +90% HER2/alex 2+ by IHC, positive by FISH, the patient had on 6/20/2022 CT scans of the chest, abdomen and pelvis done, revealing enlarged left axillary lymph node measuring just under 2 cm, no evidence of distant metastatic disease, she had a 2D echocardiogram done on 8/28/2022, revealing normal LVEF at 70%, patient was under the care of Dr. Harvie Dandy, she received the first cycle of TCHP on 8/22/2022, the course was complicated by C. difficile infection which was diagnosed on 9/8/2022. She completed the course of oral vancomycin. The patient had just started to feel rare and like herself again, the diarrhea had resolved. The breast mass had decreased in size. The patient received Arneta [place] on 10/31/2022.   On 11/1/2022, the patient received the second cycle of chemotherapy, TCH, the patient was having brain fog after the chemotherapy, brain MRI was done on 11/25/2022, revealing diffuse wall thickening and FLAIR hyperintensity of the dura, with progression, differential diagnoses include but are not limited to intracranial hypertension, meningitis, neurosarcoidosis, lymphoma, hypertrophic pachymeningitis, meningeal metastasis and postoperative reaction, referral was placed to neuro and neurosurgery for evaluation. The patient was seen by neurology, was recommended brain MRI with and without contrast, she did not have it done yet. The brain fog had resolved. Review of Systems;  CONSTITUTIONAL: No fever, chills. Decreased appetite and energy level. ENMT: Eyes: No diplopia; Nose: No epistaxis. Mouth: No sore throat. RESPIRATORY: No hemoptysis, shortness of breath, cough. CARDIOVASCULAR: No chest pain, palpitations. GASTROINTESTINAL: No nausea/vomiting, abdominal pain, diarrhea/constipation. GENITOURINARY: No dysuria, urinary frequency, hematuria. NEURO: No syncope, presyncope, headache. Remainder:  ROS NEGATIVE    Past Medical History:      Diagnosis Date    Anxiety     Hypertension     Mitral valve prolapse     Thyroid disease      Patient Active Problem List   Diagnosis    Right cataract    Personal history of breast cancer    Recurrent Clostridium difficile diarrhea        Past Surgical History:      Procedure Laterality Date    CATARACT REMOVAL WITH IMPLANT Right 2020    HYSTERECTOMY (CERVIX STATUS UNKNOWN)      INTRACAPSULAR CATARACT EXTRACTION Right 2020    RIGHT EYE CATARACT EMULSIFICATION IOL IMPLANT performed by Dejon Desai MD at [address] [place] [address]       Family History:  Family History   Problem Relation Age of Onset    Hypertension Mother     Heart Attack Father         26    Lung Cancer Brother 76    Heart Disease Brother        Medications:  Reviewed and reconciled.     Social History:  Social History     Socioeconomic History    Marital status:      Spouse name: Not on file    Number of children: Not on file    Years of education: Not on file    Highest education level: Not on file   Occupational History    Not on file   Tobacco Use    Smoking status: Former     Types: Cigarettes     Quit date: 1971     Years since quittin.0    Smokeless tobacco: Never   Vaping Use    Vaping Use: Never used Substance and Sexual Activity    Alcohol use: Never    Drug use: Never    Sexual activity: Not on file   Other Topics Concern    Not on file   Social History Narrative    Not on file     Social Determinants of Health     Financial Resource Strain: Not on file   Food Insecurity: Not on file   Transportation Needs: Not on file   Physical Activity: Not on file   Stress: Not on file   Social Connections: Not on file   Intimate Partner Violence: Not on file   Housing Stability: Not on file       Allergies: Allergies   Allergen Reactions    Adhesive Tape Itching     rash    Medrol [Methylprednisolone] Other (See Comments)     States \" out of body experience\"    Codeine Nausea And Vomiting    Darvocet A500 [Propoxyphene N-Acetaminophen] Nausea And Vomiting    Sulfa Antibiotics Nausea And Vomiting       Physical Exam:  BP (!) 143/65   Pulse 76   Temp 97.7 °F (36.5 °C)   Ht 5' 4\" (1.626 m)   Wt 178 lb (80.7 kg)   SpO2 98%   BMI 30.55 kg/m²   GENERAL: Alert, oriented x 3, not in acute distress. HEENT: PERRLA; EOMI. Oropharynx clear. NECK: Supple. No palpable cervical or supraclavicular lymphadenopathy. No neck rigidity. LUNGS: Good air entry bilaterally. No wheezing, crackles or rhonchi. CARDIOVASCULAR: Regular rate. No murmurs, rubs or gallops. BREASTS:  left breast exam is remarkable for decrease of the size of the mass. Fullness in the left axilla. ABDOMEN: Soft. Non-tender, non-distended. Positive bowel sounds. EXTREMITIES: Without clubbing, cyanosis, or edema. NEUROLOGIC: No focal deficits.      ECOG PS 1    Impression/Plan:     Mrs. Hafsa Myles is a 68-year-old lady, with a past medical history significant for anxiety, hypothyroidism, and peripheral neuropathy, had presented with an abnormal screening mammogram from 5/24/2022, it had revealed new 36 mm mass in the upper outer left breast and 19 mm lymph node in the left axilla, on 5/27/2022 she underwent an ultrasound-guided left breast mass core biopsy, pathology had revealed invasive poorly differentiated carcinoma, grade 3, ER +80% VT +90% HER2/alex 2+ by IHC, positive by FISH, the patient had on 6/20/2022 CT scans of the chest, abdomen and pelvis done, revealing enlarged left axillary lymph node measuring just under 2 cm, no evidence of distant metastatic disease, she had a 2D echocardiogram done on 8/28/2022, revealing normal LVEF at 70%, patient was under the care of Dr. Tressa Harkins, she received the first cycle of TCHP on 8/22/2022, the course was complicated by C. difficile infection which was diagnosed on 9/8/2022. She completed the course of oral vancomycin. The patient had just started to feel rare and like herself again, the diarrhea had resolved. The breast mass had decreased in size. Patient has T2 N1 M0 breast cancer, hormone receptor and HER2/alex positive, prognosis was discussed with the patient, amended to continue the neoadjuvant therapy, with the second cycle we will continue with [facility], if she does well we can reintroduce Perjeta with the subsequent cycles. Recommended evaluation by ID prior to restarting the treatment, the patient was seen by Trevin Jung NP, was recommended Zinplava infusion, which she had completed. On 11/1/2020, the patient received [facility] with dose adjustment for better tolerance, with a plan to add Perjeta for the subsequent cycles if she does well with this treatment. the patient was having brain fog after the chemotherapy, MRI was ordered, was done at [facility], the brain fog had significantly improved, no fever. No other neurological symptoms.   Brain MRI was done on 11/25/2022, revealing diffuse wall thickening and FLAIR hyperintensity of the dura, with progression, differential diagnoses include but are not limited to intracranial hypertension, meningitis, neurosarcoidosis, lymphoma, hypertrophic pachymeningitis, meningeal metastasis and postoperative reaction, referral was placed to neuro and neurosurgery for evaluation. The patient was seen by neuro, was recommended brain MRI with and without contrast.  We discussed that she has any neurological symptoms to go to the ED. the patient did not want any treatment until after the holidays, she is agreeable to resume [address], no Perjeta or steroids, labs reviewed and are unremarkable, will schedule chemotherapy for next week. She will have the echocardiogram done. Adjuvant endocrine therapy and radiation therapy will be recommended. She would like to have her surgery done with Dr. Jaswinder Jett. All her questions were answered to her apparent satisfaction. Follow-up with palliative medicine. RTC next week. Thank you for allowing us to participate in the care of Mrs. Tatiana Jovel.     Bernice Chavez MD   HEMATOLOGY/MEDICAL [address] MED ONCOLOGY  [address] [place] [address]  Shruthi Metz [id number]  Dept: 71 Kristi Kingsley: [phone]

## 2023-01-11 ENCOUNTER — TELEPHONE (OUTPATIENT)
Dept: NEUROLOGY | Age: 73
End: 2023-01-11

## 2023-01-11 NOTE — TELEPHONE ENCOUNTER
MA left message for patient to call back.   Electronically signed by Devon Tang MA on 1/11/2023 at 8:38 AM

## 2023-01-12 ENCOUNTER — TELEPHONE (OUTPATIENT)
Dept: NEUROLOGY | Age: 73
End: 2023-01-12

## 2023-01-12 ENCOUNTER — HOSPITAL ENCOUNTER (OUTPATIENT)
Dept: INFUSION THERAPY | Age: 73
Discharge: HOME | End: 2023-01-12
Payer: MEDICARE

## 2023-01-12 DIAGNOSIS — Z85.3 PERSONAL HISTORY OF BREAST CANCER: ICD-10-CM

## 2023-01-12 LAB
ALBUMIN SERPL-MCNC: 3.8 G/DL (ref 3.5–5.2)
ALP BLD-CCNC: 105 U/L (ref 35–104)
ALT SERPL-CCNC: 8 U/L (ref 0–32)
ANION GAP SERPL CALCULATED.3IONS-SCNC: 11 MMOL/L (ref 7–16)
AST SERPL-CCNC: 15 U/L (ref 0–31)
BASOPHILS ABSOLUTE: 0.04 E9/L (ref 0–0.2)
BASOPHILS RELATIVE PERCENT: 0.8 % (ref 0–2)
BILIRUB SERPL-MCNC: 0.4 MG/DL (ref 0–1.2)
BUN BLDV-MCNC: 9 MG/DL (ref 6–23)
CALCIUM SERPL-MCNC: 9.2 MG/DL (ref 8.6–10.2)
CHLORIDE BLD-SCNC: 106 MMOL/L (ref 98–107)
CO2: 26 MMOL/L (ref 22–29)
CREAT SERPL-MCNC: 0.7 MG/DL (ref 0.5–1)
EOSINOPHILS ABSOLUTE: 0.12 E9/L (ref 0.05–0.5)
EOSINOPHILS RELATIVE PERCENT: 2.3 % (ref 0–6)
GFR SERPL CREATININE-BSD FRML MDRD: >60 ML/MIN/1.73
GLUCOSE BLD-MCNC: 92 MG/DL (ref 74–99)
HCT VFR BLD CALC: 36 % (ref 34–48)
HEMOGLOBIN: 12.1 G/DL (ref 11.5–15.5)
IMMATURE GRANULOCYTES #: 0.01 E9/L
IMMATURE GRANULOCYTES %: 0.2 % (ref 0–5)
LYMPHOCYTES ABSOLUTE: 1.37 E9/L (ref 1.5–4)
LYMPHOCYTES RELATIVE PERCENT: 26.6 % (ref 20–42)
MAGNESIUM: 2 MG/DL (ref 1.6–2.6)
MCH RBC QN AUTO: 30.2 PG (ref 26–35)
MCHC RBC AUTO-ENTMCNC: 33.6 % (ref 32–34.5)
MCV RBC AUTO: 89.8 FL (ref 80–99.9)
MONOCYTES ABSOLUTE: 0.58 E9/L (ref 0.1–0.95)
MONOCYTES RELATIVE PERCENT: 11.2 % (ref 2–12)
NEUTROPHILS ABSOLUTE: 3.04 E9/L (ref 1.8–7.3)
NEUTROPHILS RELATIVE PERCENT: 58.9 % (ref 43–80)
PDW BLD-RTO: 13.2 FL (ref 11.5–15)
PLATELET # BLD: 169 E9/L (ref 130–450)
PMV BLD AUTO: 9.3 FL (ref 7–12)
POTASSIUM SERPL-SCNC: 4.3 MMOL/L (ref 3.5–5)
RBC # BLD: 4.01 E12/L (ref 3.5–5.5)
SODIUM BLD-SCNC: 143 MMOL/L (ref 132–146)
TOTAL PROTEIN: 6.6 G/DL (ref 6.4–8.3)
WBC # BLD: 5.2 E9/L (ref 4.5–11.5)

## 2023-01-12 PROCEDURE — 83735 ASSAY OF MAGNESIUM: CPT

## 2023-01-12 PROCEDURE — 80053 COMPREHEN METABOLIC PANEL: CPT

## 2023-01-12 PROCEDURE — 85025 COMPLETE CBC W/AUTO DIFF WBC: CPT

## 2023-01-12 PROCEDURE — 36415 COLL VENOUS BLD VENIPUNCTURE: CPT

## 2023-01-12 NOTE — TELEPHONE ENCOUNTER
MA called patient back. She is not taking the prednisone. LP is out of the question. She will think about it. This Radiologist protocol, Predisone must be given.   Electronically signed by Francheska Arrington MA on 1/12/2023 at 4:36 PM

## 2023-01-13 ENCOUNTER — OFFICE VISIT (OUTPATIENT)
Dept: ONCOLOGY | Age: 73
End: 2023-01-13

## 2023-01-13 ENCOUNTER — HOSPITAL ENCOUNTER (OUTPATIENT)
Dept: INFUSION THERAPY | Age: 73
Discharge: HOME | End: 2023-01-13
Payer: MEDICARE

## 2023-01-13 VITALS
WEIGHT: 178 LBS | TEMPERATURE: 98.1 F | SYSTOLIC BLOOD PRESSURE: 148 MMHG | DIASTOLIC BLOOD PRESSURE: 69 MMHG | RESPIRATION RATE: 18 BRPM | HEIGHT: 64 IN | HEART RATE: 71 BPM | BODY MASS INDEX: 30.39 KG/M2 | OXYGEN SATURATION: 97 %

## 2023-01-13 VITALS
DIASTOLIC BLOOD PRESSURE: 74 MMHG | TEMPERATURE: 97.7 F | HEART RATE: 64 BPM | SYSTOLIC BLOOD PRESSURE: 123 MMHG | RESPIRATION RATE: 16 BRPM

## 2023-01-13 DIAGNOSIS — Z85.3 PERSONAL HISTORY OF BREAST CANCER: Primary | ICD-10-CM

## 2023-01-13 PROCEDURE — 2580000003 HC RX 258: Performed by: INTERNAL MEDICINE

## 2023-01-13 PROCEDURE — 96417 CHEMO IV INFUS EACH ADDL SEQ: CPT

## 2023-01-13 PROCEDURE — 6370000000 HC RX 637 (ALT 250 FOR IP): Performed by: INTERNAL MEDICINE

## 2023-01-13 PROCEDURE — 96375 TX/PRO/DX INJ NEW DRUG ADDON: CPT

## 2023-01-13 PROCEDURE — 96413 CHEMO IV INFUSION 1 HR: CPT

## 2023-01-13 PROCEDURE — 96366 THER/PROPH/DIAG IV INF ADDON: CPT

## 2023-01-13 PROCEDURE — 6360000002 HC RX W HCPCS: Performed by: INTERNAL MEDICINE

## 2023-01-13 PROCEDURE — 96377 APPLICATON ON-BODY INJECTOR: CPT

## 2023-01-13 PROCEDURE — 96367 TX/PROPH/DG ADDL SEQ IV INF: CPT

## 2023-01-13 RX ORDER — HEPARIN SODIUM (PORCINE) LOCK FLUSH IV SOLN 100 UNIT/ML 100 UNIT/ML
500 SOLUTION INTRAVENOUS PRN
Status: CANCELLED | OUTPATIENT
Start: 2023-01-13

## 2023-01-13 RX ORDER — DIPHENHYDRAMINE HYDROCHLORIDE 50 MG/ML
50 INJECTION INTRAMUSCULAR; INTRAVENOUS
Status: COMPLETED | OUTPATIENT
Start: 2023-01-13 | End: 2023-01-13

## 2023-01-13 RX ORDER — DIPHENHYDRAMINE HYDROCHLORIDE 50 MG/ML
50 INJECTION INTRAMUSCULAR; INTRAVENOUS
Status: CANCELLED | OUTPATIENT
Start: 2023-01-13

## 2023-01-13 RX ORDER — SODIUM CHLORIDE 9 MG/ML
5-250 INJECTION, SOLUTION INTRAVENOUS PRN
Status: DISCONTINUED | OUTPATIENT
Start: 2023-01-13 | End: 2023-01-14 | Stop reason: HOSPADM

## 2023-01-13 RX ORDER — ONDANSETRON 2 MG/ML
8 INJECTION INTRAMUSCULAR; INTRAVENOUS
Status: CANCELLED | OUTPATIENT
Start: 2023-01-13

## 2023-01-13 RX ORDER — ACETAMINOPHEN 325 MG/1
650 TABLET ORAL
Status: CANCELLED | OUTPATIENT
Start: 2023-01-13

## 2023-01-13 RX ORDER — ACETAMINOPHEN 325 MG/1
650 TABLET ORAL
Status: COMPLETED | OUTPATIENT
Start: 2023-01-13 | End: 2023-01-13

## 2023-01-13 RX ORDER — FAMOTIDINE 10 MG/ML
20 INJECTION, SOLUTION INTRAVENOUS
Status: CANCELLED | OUTPATIENT
Start: 2023-01-13

## 2023-01-13 RX ORDER — SODIUM CHLORIDE 9 MG/ML
5-250 INJECTION, SOLUTION INTRAVENOUS PRN
Status: CANCELLED | OUTPATIENT
Start: 2023-01-13

## 2023-01-13 RX ORDER — HEPARIN SODIUM (PORCINE) LOCK FLUSH IV SOLN 100 UNIT/ML 100 UNIT/ML
500 SOLUTION INTRAVENOUS PRN
Status: DISCONTINUED | OUTPATIENT
Start: 2023-01-13 | End: 2023-01-14 | Stop reason: HOSPADM

## 2023-01-13 RX ORDER — SODIUM CHLORIDE 0.9 % (FLUSH) 0.9 %
5-40 SYRINGE (ML) INJECTION PRN
Status: CANCELLED | OUTPATIENT
Start: 2023-01-13

## 2023-01-13 RX ORDER — SODIUM CHLORIDE 9 MG/ML
INJECTION, SOLUTION INTRAVENOUS CONTINUOUS
Status: CANCELLED | OUTPATIENT
Start: 2023-01-13

## 2023-01-13 RX ORDER — SODIUM CHLORIDE 0.9 % (FLUSH) 0.9 %
5-40 SYRINGE (ML) INJECTION PRN
Status: DISCONTINUED | OUTPATIENT
Start: 2023-01-13 | End: 2023-01-14 | Stop reason: HOSPADM

## 2023-01-13 RX ORDER — EPINEPHRINE 1 MG/ML
0.3 INJECTION, SOLUTION, CONCENTRATE INTRAVENOUS PRN
Status: CANCELLED | OUTPATIENT
Start: 2023-01-13

## 2023-01-13 RX ORDER — PALONOSETRON HYDROCHLORIDE 0.05 MG/ML
0.25 INJECTION, SOLUTION INTRAVENOUS ONCE
Status: COMPLETED | OUTPATIENT
Start: 2023-01-13 | End: 2023-01-13

## 2023-01-13 RX ORDER — PALONOSETRON 0.05 MG/ML
0.25 INJECTION, SOLUTION INTRAVENOUS ONCE
Status: CANCELLED | OUTPATIENT
Start: 2023-01-13 | End: 2023-01-13

## 2023-01-13 RX ORDER — ALBUTEROL SULFATE 90 UG/1
4 AEROSOL, METERED RESPIRATORY (INHALATION) PRN
Status: CANCELLED | OUTPATIENT
Start: 2023-01-13

## 2023-01-13 RX ORDER — MEPERIDINE HYDROCHLORIDE 50 MG/ML
12.5 INJECTION INTRAMUSCULAR; INTRAVENOUS; SUBCUTANEOUS PRN
Status: CANCELLED | OUTPATIENT
Start: 2023-01-13

## 2023-01-13 RX ORDER — SODIUM CHLORIDE 9 MG/ML
5-40 INJECTION INTRAVENOUS PRN
Status: CANCELLED | OUTPATIENT
Start: 2023-01-13

## 2023-01-13 RX ADMIN — ACETAMINOPHEN 650 MG: 325 TABLET, FILM COATED ORAL at 09:12

## 2023-01-13 RX ADMIN — PEGFILGRASTIM 6 MG: KIT SUBCUTANEOUS at 12:24

## 2023-01-13 RX ADMIN — PALONOSETRON 0.25 MG: 0.25 INJECTION, SOLUTION INTRAVENOUS at 09:14

## 2023-01-13 RX ADMIN — HEPARIN 500 UNITS: 100 SYRINGE at 12:57

## 2023-01-13 RX ADMIN — SODIUM CHLORIDE, PRESERVATIVE FREE 10 ML: 5 INJECTION INTRAVENOUS at 12:57

## 2023-01-13 RX ADMIN — DIPHENHYDRAMINE HYDROCHLORIDE 50 MG: 50 INJECTION, SOLUTION INTRAMUSCULAR; INTRAVENOUS at 09:19

## 2023-01-13 RX ADMIN — FOSAPREPITANT 150 MG: 150 INJECTION, POWDER, LYOPHILIZED, FOR SOLUTION INTRAVENOUS at 09:28

## 2023-01-13 RX ADMIN — DOCETAXEL 140 MG: 20 INJECTION, SOLUTION INTRAVENOUS at 10:59

## 2023-01-13 RX ADMIN — SODIUM CHLORIDE 483 MG: 9 INJECTION, SOLUTION INTRAVENOUS at 10:08

## 2023-01-13 RX ADMIN — SODIUM CHLORIDE 20 ML/HR: 9 INJECTION, SOLUTION INTRAVENOUS at 09:10

## 2023-01-13 RX ADMIN — CARBOPLATIN 500 MG: 10 INJECTION, SOLUTION INTRAVENOUS at 12:16

## 2023-01-13 NOTE — PROGRESS NOTES
[address]  [facility] MED ONCOLOGY  [address] [facility] 29. [address]  Dept: 71 Kristi Narcisolorenzablake: [phone]  Attending progress note      Reason for Visit:   Left breast cancer. Referring Physician: Dr. Samreen Lindsay. PCP:  Haylee Epperson MD    History of Present Illness:    Mrs. Rob Horta is a 57-year-old lady, with a past medical history significant for anxiety, hypothyroidism, and peripheral neuropathy, had presented with an abnormal screening mammogram from 5/24/2022, it had revealed new 3 6 mm mass in the upper outer left breast and 19 mm lymph node in the left axilla, on 5/27/2022 she underwent an ultrasound-guided left breast mass core biopsy, pathology had revealed invasive poorly differentiated carcinoma, grade 3, ER +80% VT +90% HER2/alex 2+ by IHC, positive by FISH, the patient had on 6/20/2022 CT scans of the chest, abdomen and pelvis done, revealing enlarged left axillary lymph node measuring just under 2 cm, no evidence of distant metastatic disease, she had a 2D echocardiogram done on 8/28/2022, revealing normal LVEF at 70%, patient was under the care of Dr. Samreen Lindsay, she received the first cycle of TCHP on 8/22/2022, the course was complicated by C. difficile infection which was diagnosed on 9/8/2022. She completed the course of oral vancomycin. The patient had just started to feel rare and like herself again, the diarrhea had resolved. The breast mass had decreased in size. The patient received Leveda Meline on 10/31/2022.   On 11/1/2022, the patient received the second cycle of chemotherapy, TCH, the patient was having brain fog after the chemotherapy, brain MRI was done on 11/25/2022, revealing diffuse wall thickening and FLAIR hyperintensity of the dura, with progression, differential diagnoses include but are not limited to intracranial hypertension, meningitis, neurosarcoidosis, lymphoma, hypertrophic pachymeningitis, meningeal metastasis and postoperative reaction, referral was placed to neuro and neurosurgery for evaluation. The patient was seen by neurology, was recommended brain MRI with and without contrast, she did not have it done yet. The brain fog had resolved. Today 2023, chemotherapy will be resumed    Review of Systems;  CONSTITUTIONAL: No fever, chills. Decreased appetite and energy level. ENMT: Eyes: No diplopia; Nose: No epistaxis. Mouth: No sore throat. RESPIRATORY: No hemoptysis, shortness of breath, cough. CARDIOVASCULAR: No chest pain, palpitations. GASTROINTESTINAL: No nausea/vomiting, abdominal pain, diarrhea/constipation. GENITOURINARY: No dysuria, urinary frequency, hematuria. NEURO: No syncope, presyncope, headache. Remainder:  ROS NEGATIVE    Past Medical History:      Diagnosis Date    Anxiety     Hypertension     Mitral valve prolapse     Thyroid disease      Patient Active Problem List   Diagnosis    Right cataract    Personal history of breast cancer    Recurrent Clostridium difficile diarrhea        Past Surgical History:      Procedure Laterality Date    CATARACT REMOVAL WITH IMPLANT Right 2020    HYSTERECTOMY (CERVIX STATUS UNKNOWN)      INTRACAPSULAR CATARACT EXTRACTION Right 2020    RIGHT EYE CATARACT EMULSIFICATION IOL IMPLANT performed by Tom Mo MD at [address] [place] [address]       Family History:  Family History   Problem Relation Age of Onset    Hypertension Mother     Heart Attack Father         26    Lung Cancer Brother 76    Heart Disease Brother        Medications:  Reviewed and reconciled.     Social History:  Social History     Socioeconomic History    Marital status:      Spouse name: Not on file    Number of children: Not on file    Years of education: Not on file    Highest education level: Not on file   Occupational History    Not on file   Tobacco Use    Smoking status: Former     Types: Cigarettes     Quit date: 1971     Years since quittin.0    Smokeless tobacco: Never Vaping Use    Vaping Use: Never used   Substance and Sexual Activity    Alcohol use: Never    Drug use: Never    Sexual activity: Not on file   Other Topics Concern    Not on file   Social History Narrative    Not on file     Social Determinants of Health     Financial Resource Strain: Not on file   Food Insecurity: Not on file   Transportation Needs: Not on file   Physical Activity: Not on file   Stress: Not on file   Social Connections: Not on file   Intimate Partner Violence: Not on file   Housing Stability: Not on file       Allergies: Allergies   Allergen Reactions    Adhesive Tape Itching     rash    Medrol [Methylprednisolone] Other (See Comments)     States \" out of body experience\"    Codeine Nausea And Vomiting    Darvocet A500 [Propoxyphene N-Acetaminophen] Nausea And Vomiting    Sulfa Antibiotics Nausea And Vomiting       Physical Exam:  BP (!) 148/69   Pulse 71   Temp 98.1 °F (36.7 °C) (Infrared)   Resp 18   Ht 5' 4\" (1.626 m)   Wt 178 lb (80.7 kg)   SpO2 97%   BMI 30.55 kg/m²   GENERAL: Alert, oriented x 3, not in acute distress. HEENT: PERRLA; EOMI. Oropharynx clear. NECK: Supple. No palpable cervical or supraclavicular lymphadenopathy. No neck rigidity. LUNGS: Good air entry bilaterally. No wheezing, crackles or rhonchi. CARDIOVASCULAR: Regular rate. No murmurs, rubs or gallops. BREASTS:  left breast exam is remarkable for decrease of the size of the mass. Fullness in the left axilla. ABDOMEN: Soft. Non-tender, non-distended. Positive bowel sounds. EXTREMITIES: Without clubbing, cyanosis, or edema. NEUROLOGIC: No focal deficits.      ECOG PS 1    Impression/Plan:     Mrs. Merlinda Ree is a 66-year-old lady, with a past medical history significant for anxiety, hypothyroidism, and peripheral neuropathy, had presented with an abnormal screening mammogram from 5/24/2022, it had revealed new 36 mm mass in the upper outer left breast and 19 mm lymph node in the left axilla, on 5/27/2022 she underwent an ultrasound-guided left breast mass core biopsy, pathology had revealed invasive poorly differentiated carcinoma, grade 3, ER +80% WI +90% HER2/alex 2+ by IHC, positive by FISH, the patient had on 6/20/2022 CT scans of the chest, abdomen and pelvis done, revealing enlarged left axillary lymph node measuring just under 2 cm, no evidence of distant metastatic disease, she had a 2D echocardiogram done on 8/28/2022, revealing normal LVEF at 70%, patient was under the care of Dr. Gerry Brooke, she received the first cycle of TCHP on 8/22/2022, the course was complicated by C. difficile infection which was diagnosed on 9/8/2022. She completed the course of oral vancomycin. The patient had just started to feel rare and like herself again, the diarrhea had resolved. The breast mass had decreased in size. Patient has T2 N1 M0 breast cancer, hormone receptor and HER2/alex positive, prognosis was discussed with the patient, amended to continue the neoadjuvant therapy, with the second cycle we will continue with [facility], if she does well we can reintroduce Perjeta with the subsequent cycles. Recommended evaluation by ID prior to restarting the treatment, the patient was seen by Mike Dillon NP, was recommended Zinplava infusion, which she had completed. On 11/1/2020, the patient received [facility] with dose adjustment for better tolerance, with a plan to add Perjeta for the subsequent cycles if she does well with this treatment. the patient was having brain fog after the chemotherapy, MRI was ordered, was done at [facility], the brain fog had significantly improved, no fever. No other neurological symptoms.   Brain MRI was done on 11/25/2022, revealing diffuse wall thickening and FLAIR hyperintensity of the dura, with progression, differential diagnoses include but are not limited to intracranial hypertension, meningitis, neurosarcoidosis, lymphoma, hypertrophic pachymeningitis, meningeal metastasis and postoperative reaction, referral was placed to neuro and neurosurgery for evaluation. The patient was seen by neuro, was recommended brain MRI with and without contrast.  We discussed that she has any neurological symptoms to go to the ED. the patient did not want any treatment until after the holidays, she is agreeable to resume [address], no Perjeta or steroids. Today 1/13/2023, the patient is doing well clinically, labs reviewed, blood counts adequate for treatment, proceed with TCH, cycle #3. The patient will schedule the echocardiogram.    Adjuvant endocrine therapy and radiation therapy will be recommended. She would like to have her surgery done with Dr. Yany Granados. All her questions were answered to her apparent satisfaction. Follow-up with palliative medicine. RTC in 3 weeks, the patient will call if she has any problems. Thank you for allowing us to participate in the care of Mrs. Nancie Claude.     Jermaine Bryant MD   HEMATOLOGY/MEDICAL [address] [place] MED ONCOLOGY  [address] [place] [id number]  Dept: 71 Kristi Kingsley: [phone]

## 2023-01-13 NOTE — TELEPHONE ENCOUNTER
Okay.     Please make sure she is aware that without these tests we will not be able to know what the abnormality on her MRI of the brain without contrast is. There is a high concern given her history that she has had spread of her cancer to her brain. Obtaining this information and pending the diagnosis would likely change her treatment plan by her oncologist.     HIGHLY RECOMMEND LUMBAR PUNCTURE BEING COMPLETED. If she is not willing to do this, she can follow up with the office as needed.

## 2023-01-13 NOTE — TELEPHONE ENCOUNTER
MA  left message reading word for word Joanne's response regarding MRI and LP.   Electronically signed by Hawa Dhaliwal MA on 1/13/2023 at 9:20 AM

## 2023-01-13 NOTE — PROGRESS NOTES
Patient tolerated treatment well without complications or complaints. Alert and oriented x3. Patient aware of potential side effects and has no questions regarding treatment. Vitals stable throughout treatment. Pain assessed, patient denies any new or worsening pain. Patient left ambulatory with .

## 2023-01-19 ENCOUNTER — TELEPHONE (OUTPATIENT)
Dept: INFUSION THERAPY | Age: 73
End: 2023-01-19

## 2023-01-19 NOTE — TELEPHONE ENCOUNTER
Patient had called and asked if she can come in for hydration. C/o  mouth dry and nose is burning and bleeding. After reviewed with Dr. Janina Dunham, patient will come tomorrow 1/20/23 for labs and hydration. Patient states she would not like all the fluid they gave last infusion. Patient states it was too much.

## 2023-01-20 ENCOUNTER — HOSPITAL ENCOUNTER (OUTPATIENT)
Dept: INFUSION THERAPY | Age: 73
Discharge: HOME | End: 2023-01-20
Payer: MEDICARE

## 2023-01-20 VITALS
SYSTOLIC BLOOD PRESSURE: 137 MMHG | RESPIRATION RATE: 16 BRPM | TEMPERATURE: 97.8 F | HEART RATE: 70 BPM | OXYGEN SATURATION: 98 % | DIASTOLIC BLOOD PRESSURE: 63 MMHG

## 2023-01-20 DIAGNOSIS — Z85.3 PERSONAL HISTORY OF BREAST CANCER: Primary | ICD-10-CM

## 2023-01-20 LAB
ALBUMIN SERPL-MCNC: 3.9 G/DL (ref 3.5–5.2)
ALP BLD-CCNC: 141 U/L (ref 35–104)
ALT SERPL-CCNC: 13 U/L (ref 0–32)
ANION GAP SERPL CALCULATED.3IONS-SCNC: 13 MMOL/L (ref 7–16)
AST SERPL-CCNC: 23 U/L (ref 0–31)
BASOPHILS ABSOLUTE: 0 E9/L (ref 0–0.2)
BASOPHILS RELATIVE PERCENT: 0.3 % (ref 0–2)
BILIRUB SERPL-MCNC: 0.2 MG/DL (ref 0–1.2)
BUN BLDV-MCNC: 4 MG/DL (ref 6–23)
CALCIUM SERPL-MCNC: 9.3 MG/DL (ref 8.6–10.2)
CHLORIDE BLD-SCNC: 101 MMOL/L (ref 98–107)
CO2: 26 MMOL/L (ref 22–29)
CREAT SERPL-MCNC: 0.8 MG/DL (ref 0.5–1)
EOSINOPHILS ABSOLUTE: 0 E9/L (ref 0.05–0.5)
EOSINOPHILS RELATIVE PERCENT: 0.9 % (ref 0–6)
GFR SERPL CREATININE-BSD FRML MDRD: >60 ML/MIN/1.73
GLUCOSE BLD-MCNC: 116 MG/DL (ref 74–99)
HCT VFR BLD CALC: 34.6 % (ref 34–48)
HEMOGLOBIN: 12.1 G/DL (ref 11.5–15.5)
HYPOCHROMIA: ABNORMAL
LYMPHOCYTES ABSOLUTE: 2.4 E9/L (ref 1.5–4)
LYMPHOCYTES RELATIVE PERCENT: 20 % (ref 20–42)
MAGNESIUM: 1.7 MG/DL (ref 1.6–2.6)
MCH RBC QN AUTO: 30.6 PG (ref 26–35)
MCHC RBC AUTO-ENTMCNC: 35 % (ref 32–34.5)
MCV RBC AUTO: 87.6 FL (ref 80–99.9)
METAMYELOCYTES RELATIVE PERCENT: 3.5 % (ref 0–1)
MONOCYTES ABSOLUTE: 0.6 E9/L (ref 0.1–0.95)
MONOCYTES RELATIVE PERCENT: 5.2 % (ref 2–12)
NEUTROPHILS ABSOLUTE: 8.88 E9/L (ref 1.8–7.3)
NEUTROPHILS RELATIVE PERCENT: 70.4 % (ref 43–80)
OVALOCYTES: ABNORMAL
PDW BLD-RTO: 13.1 FL (ref 11.5–15)
PLATELET # BLD: 181 E9/L (ref 130–450)
PMV BLD AUTO: 10.3 FL (ref 7–12)
POIKILOCYTES: ABNORMAL
POLYCHROMASIA: ABNORMAL
POTASSIUM SERPL-SCNC: 4 MMOL/L (ref 3.5–5)
PROMYELOCYTES PERCENT: 0.9 % (ref 0–0)
RBC # BLD: 3.95 E12/L (ref 3.5–5.5)
SODIUM BLD-SCNC: 140 MMOL/L (ref 132–146)
TARGET CELLS: ABNORMAL
TOTAL PROTEIN: 6.8 G/DL (ref 6.4–8.3)
WBC # BLD: 12 E9/L (ref 4.5–11.5)

## 2023-01-20 PROCEDURE — 2580000003 HC RX 258: Performed by: INTERNAL MEDICINE

## 2023-01-20 PROCEDURE — 85025 COMPLETE CBC W/AUTO DIFF WBC: CPT

## 2023-01-20 PROCEDURE — 83735 ASSAY OF MAGNESIUM: CPT

## 2023-01-20 PROCEDURE — 6360000002 HC RX W HCPCS: Performed by: INTERNAL MEDICINE

## 2023-01-20 PROCEDURE — 96360 HYDRATION IV INFUSION INIT: CPT

## 2023-01-20 PROCEDURE — 80053 COMPREHEN METABOLIC PANEL: CPT

## 2023-01-20 RX ORDER — 0.9 % SODIUM CHLORIDE 0.9 %
500 INTRAVENOUS SOLUTION INTRAVENOUS ONCE
Status: COMPLETED | OUTPATIENT
Start: 2023-01-20 | End: 2023-01-20

## 2023-01-20 RX ORDER — HEPARIN SODIUM (PORCINE) LOCK FLUSH IV SOLN 100 UNIT/ML 100 UNIT/ML
500 SOLUTION INTRAVENOUS PRN
Status: DISCONTINUED | OUTPATIENT
Start: 2023-01-20 | End: 2023-01-21 | Stop reason: HOSPADM

## 2023-01-20 RX ORDER — SODIUM CHLORIDE 0.9 % (FLUSH) 0.9 %
5-40 SYRINGE (ML) INJECTION PRN
OUTPATIENT
Start: 2023-01-20

## 2023-01-20 RX ORDER — SODIUM CHLORIDE 0.9 % (FLUSH) 0.9 %
5-40 SYRINGE (ML) INJECTION PRN
Status: DISCONTINUED | OUTPATIENT
Start: 2023-01-20 | End: 2023-01-21 | Stop reason: HOSPADM

## 2023-01-20 RX ORDER — HEPARIN SODIUM (PORCINE) LOCK FLUSH IV SOLN 100 UNIT/ML 100 UNIT/ML
500 SOLUTION INTRAVENOUS PRN
OUTPATIENT
Start: 2023-01-20

## 2023-01-20 RX ORDER — SODIUM CHLORIDE 9 MG/ML
25 INJECTION, SOLUTION INTRAVENOUS PRN
OUTPATIENT
Start: 2023-01-20

## 2023-01-20 RX ADMIN — SODIUM CHLORIDE 500 ML: 9 INJECTION, SOLUTION INTRAVENOUS at 14:27

## 2023-01-20 RX ADMIN — SODIUM CHLORIDE, PRESERVATIVE FREE 10 ML: 5 INJECTION INTRAVENOUS at 15:21

## 2023-01-20 RX ADMIN — HEPARIN 500 UNITS: 100 SYRINGE at 15:19

## 2023-01-20 RX ADMIN — SODIUM CHLORIDE, PRESERVATIVE FREE 10 ML: 5 INJECTION INTRAVENOUS at 14:14

## 2023-02-06 ENCOUNTER — HOSPITAL ENCOUNTER (OUTPATIENT)
Dept: INFUSION THERAPY | Age: 73
End: 2023-02-06

## 2023-02-07 ENCOUNTER — HOSPITAL ENCOUNTER (OUTPATIENT)
Dept: INFUSION THERAPY | Age: 73
End: 2023-02-07

## 2023-02-07 ENCOUNTER — TELEPHONE (OUTPATIENT)
Dept: NON INVASIVE DIAGNOSTICS | Age: 73
End: 2023-02-07

## 2023-02-08 ENCOUNTER — HOSPITAL ENCOUNTER (OUTPATIENT)
Dept: NON INVASIVE DIAGNOSTICS | Age: 73
Discharge: HOME | End: 2023-02-08
Payer: MEDICARE

## 2023-02-08 DIAGNOSIS — T45.1X5D ADVERSE EFFECT OF CHEMOTHERAPY, SUBSEQUENT ENCOUNTER: ICD-10-CM

## 2023-02-08 LAB
LV EF: 63 %
LVEF MODALITY: NORMAL

## 2023-02-08 PROCEDURE — 93356 MYOCRD STRAIN IMG SPCKL TRCK: CPT

## 2023-02-08 PROCEDURE — 93308 TTE F-UP OR LMTD: CPT

## 2023-02-10 ENCOUNTER — OFFICE VISIT (OUTPATIENT)
Dept: ONCOLOGY | Age: 73
End: 2023-02-10
Payer: MEDICARE

## 2023-02-10 ENCOUNTER — HOSPITAL ENCOUNTER (OUTPATIENT)
Dept: INFUSION THERAPY | Age: 73
Discharge: HOME | End: 2023-02-10
Payer: MEDICARE

## 2023-02-10 VITALS
BODY MASS INDEX: 29.94 KG/M2 | WEIGHT: 175.4 LBS | SYSTOLIC BLOOD PRESSURE: 125 MMHG | HEART RATE: 71 BPM | HEIGHT: 64 IN | TEMPERATURE: 97.9 F | OXYGEN SATURATION: 98 % | DIASTOLIC BLOOD PRESSURE: 58 MMHG

## 2023-02-10 DIAGNOSIS — Z85.3 PERSONAL HISTORY OF BREAST CANCER: Primary | ICD-10-CM

## 2023-02-10 LAB
ALBUMIN SERPL-MCNC: 3.9 G/DL (ref 3.5–5.2)
ALP BLD-CCNC: 117 U/L (ref 35–104)
ALT SERPL-CCNC: 10 U/L (ref 0–32)
ANION GAP SERPL CALCULATED.3IONS-SCNC: 8 MMOL/L (ref 7–16)
AST SERPL-CCNC: 15 U/L (ref 0–31)
BASOPHILS ABSOLUTE: 0.04 E9/L (ref 0–0.2)
BASOPHILS RELATIVE PERCENT: 0.7 % (ref 0–2)
BILIRUB SERPL-MCNC: 0.4 MG/DL (ref 0–1.2)
BUN BLDV-MCNC: 11 MG/DL (ref 6–23)
CALCIUM SERPL-MCNC: 9 MG/DL (ref 8.6–10.2)
CHLORIDE BLD-SCNC: 105 MMOL/L (ref 98–107)
CO2: 27 MMOL/L (ref 22–29)
CREAT SERPL-MCNC: 0.7 MG/DL (ref 0.5–1)
EOSINOPHILS ABSOLUTE: 0.23 E9/L (ref 0.05–0.5)
EOSINOPHILS RELATIVE PERCENT: 4.1 % (ref 0–6)
GFR SERPL CREATININE-BSD FRML MDRD: >60 ML/MIN/1.73
GLUCOSE BLD-MCNC: 117 MG/DL (ref 74–99)
HCT VFR BLD CALC: 30.9 % (ref 34–48)
HEMOGLOBIN: 10.5 G/DL (ref 11.5–15.5)
IMMATURE GRANULOCYTES #: 0.02 E9/L
IMMATURE GRANULOCYTES %: 0.4 % (ref 0–5)
LYMPHOCYTES ABSOLUTE: 1.68 E9/L (ref 1.5–4)
LYMPHOCYTES RELATIVE PERCENT: 29.9 % (ref 20–42)
MAGNESIUM: 2 MG/DL (ref 1.6–2.6)
MCH RBC QN AUTO: 30.7 PG (ref 26–35)
MCHC RBC AUTO-ENTMCNC: 34 % (ref 32–34.5)
MCV RBC AUTO: 90.4 FL (ref 80–99.9)
MONOCYTES ABSOLUTE: 0.63 E9/L (ref 0.1–0.95)
MONOCYTES RELATIVE PERCENT: 11.2 % (ref 2–12)
NEUTROPHILS ABSOLUTE: 3.02 E9/L (ref 1.8–7.3)
NEUTROPHILS RELATIVE PERCENT: 53.7 % (ref 43–80)
PDW BLD-RTO: 15.3 FL (ref 11.5–15)
PLATELET # BLD: 124 E9/L (ref 130–450)
PMV BLD AUTO: 8.7 FL (ref 7–12)
POTASSIUM SERPL-SCNC: 3.9 MMOL/L (ref 3.5–5)
RBC # BLD: 3.42 E12/L (ref 3.5–5.5)
SODIUM BLD-SCNC: 140 MMOL/L (ref 132–146)
TOTAL PROTEIN: 6.3 G/DL (ref 6.4–8.3)
WBC # BLD: 5.6 E9/L (ref 4.5–11.5)

## 2023-02-10 PROCEDURE — 85025 COMPLETE CBC W/AUTO DIFF WBC: CPT

## 2023-02-10 PROCEDURE — 83735 ASSAY OF MAGNESIUM: CPT

## 2023-02-10 PROCEDURE — 99214 OFFICE O/P EST MOD 30 MIN: CPT | Performed by: INTERNAL MEDICINE

## 2023-02-10 PROCEDURE — 1123F ACP DISCUSS/DSCN MKR DOCD: CPT | Performed by: INTERNAL MEDICINE

## 2023-02-10 PROCEDURE — 3017F COLORECTAL CA SCREEN DOC REV: CPT | Performed by: INTERNAL MEDICINE

## 2023-02-10 PROCEDURE — G8400 PT W/DXA NO RESULTS DOC: HCPCS | Performed by: INTERNAL MEDICINE

## 2023-02-10 PROCEDURE — 1036F TOBACCO NON-USER: CPT | Performed by: INTERNAL MEDICINE

## 2023-02-10 PROCEDURE — G8484 FLU IMMUNIZE NO ADMIN: HCPCS | Performed by: INTERNAL MEDICINE

## 2023-02-10 PROCEDURE — G8427 DOCREV CUR MEDS BY ELIG CLIN: HCPCS | Performed by: INTERNAL MEDICINE

## 2023-02-10 PROCEDURE — 1090F PRES/ABSN URINE INCON ASSESS: CPT | Performed by: INTERNAL MEDICINE

## 2023-02-10 PROCEDURE — G8417 CALC BMI ABV UP PARAM F/U: HCPCS | Performed by: INTERNAL MEDICINE

## 2023-02-10 PROCEDURE — 6360000002 HC RX W HCPCS: Performed by: INTERNAL MEDICINE

## 2023-02-10 PROCEDURE — 80053 COMPREHEN METABOLIC PANEL: CPT

## 2023-02-10 PROCEDURE — 2580000003 HC RX 258: Performed by: INTERNAL MEDICINE

## 2023-02-10 PROCEDURE — 36591 DRAW BLOOD OFF VENOUS DEVICE: CPT

## 2023-02-10 RX ORDER — SODIUM CHLORIDE 9 MG/ML
5-250 INJECTION, SOLUTION INTRAVENOUS PRN
OUTPATIENT
Start: 2023-02-10

## 2023-02-10 RX ORDER — HEPARIN SODIUM (PORCINE) LOCK FLUSH IV SOLN 100 UNIT/ML 100 UNIT/ML
500 SOLUTION INTRAVENOUS PRN
OUTPATIENT
Start: 2023-02-10

## 2023-02-10 RX ORDER — ONDANSETRON 2 MG/ML
8 INJECTION INTRAMUSCULAR; INTRAVENOUS
OUTPATIENT
Start: 2023-02-10

## 2023-02-10 RX ORDER — ACETAMINOPHEN 325 MG/1
650 TABLET ORAL
OUTPATIENT
Start: 2023-02-10

## 2023-02-10 RX ORDER — HEPARIN SODIUM (PORCINE) LOCK FLUSH IV SOLN 100 UNIT/ML 100 UNIT/ML
500 SOLUTION INTRAVENOUS PRN
Status: DISCONTINUED | OUTPATIENT
Start: 2023-02-10 | End: 2023-02-11 | Stop reason: HOSPADM

## 2023-02-10 RX ORDER — SODIUM CHLORIDE 9 MG/ML
25 INJECTION, SOLUTION INTRAVENOUS PRN
OUTPATIENT
Start: 2023-02-10

## 2023-02-10 RX ORDER — SODIUM CHLORIDE 0.9 % (FLUSH) 0.9 %
5-40 SYRINGE (ML) INJECTION PRN
Status: DISCONTINUED | OUTPATIENT
Start: 2023-02-10 | End: 2023-02-11 | Stop reason: HOSPADM

## 2023-02-10 RX ORDER — SODIUM CHLORIDE 0.9 % (FLUSH) 0.9 %
5-40 SYRINGE (ML) INJECTION PRN
OUTPATIENT
Start: 2023-02-10

## 2023-02-10 RX ORDER — EPINEPHRINE 1 MG/ML
0.3 INJECTION, SOLUTION, CONCENTRATE INTRAVENOUS PRN
OUTPATIENT
Start: 2023-02-10

## 2023-02-10 RX ORDER — PALONOSETRON 0.05 MG/ML
0.25 INJECTION, SOLUTION INTRAVENOUS ONCE
OUTPATIENT
Start: 2023-02-10 | End: 2023-02-10

## 2023-02-10 RX ORDER — SODIUM CHLORIDE 9 MG/ML
INJECTION, SOLUTION INTRAVENOUS CONTINUOUS
OUTPATIENT
Start: 2023-02-10

## 2023-02-10 RX ORDER — MEPERIDINE HYDROCHLORIDE 50 MG/ML
12.5 INJECTION INTRAMUSCULAR; INTRAVENOUS; SUBCUTANEOUS PRN
OUTPATIENT
Start: 2023-02-10

## 2023-02-10 RX ORDER — SODIUM CHLORIDE 9 MG/ML
5-40 INJECTION INTRAVENOUS PRN
OUTPATIENT
Start: 2023-02-10

## 2023-02-10 RX ORDER — DIPHENHYDRAMINE HYDROCHLORIDE 50 MG/ML
50 INJECTION INTRAMUSCULAR; INTRAVENOUS
OUTPATIENT
Start: 2023-02-10

## 2023-02-10 RX ORDER — FAMOTIDINE 10 MG/ML
20 INJECTION, SOLUTION INTRAVENOUS
OUTPATIENT
Start: 2023-02-10

## 2023-02-10 RX ORDER — ALBUTEROL SULFATE 90 UG/1
4 AEROSOL, METERED RESPIRATORY (INHALATION) PRN
OUTPATIENT
Start: 2023-02-10

## 2023-02-10 RX ADMIN — SODIUM CHLORIDE, PRESERVATIVE FREE 10 ML: 5 INJECTION INTRAVENOUS at 08:29

## 2023-02-10 RX ADMIN — HEPARIN 500 UNITS: 100 SYRINGE at 08:30

## 2023-02-10 RX ADMIN — SODIUM CHLORIDE, PRESERVATIVE FREE 10 ML: 5 INJECTION INTRAVENOUS at 08:30

## 2023-02-10 NOTE — PROGRESS NOTES
701  [facility] MED ONCOLOGY  [address] [facility] [address]. [id number]  Dept: Jani Kingsley: [phone]  Attending progress note      Reason for Visit:   Left breast cancer. Referring Physician: Dr. Arlette Zelaya. PCP:  Karen Pichardo MD    History of Present Illness:     Mrs. Douglas Mcintosh is a 78-year-old lady, with a past medical history significant for anxiety, hypothyroidism, and peripheral neuropathy, had presented with an abnormal screening mammogram from 5/24/2022, it had revealed new 3 6 mm mass in the upper outer left breast and 19 mm lymph node in the left axilla, on 5/27/2022 she underwent an ultrasound-guided left breast mass core biopsy, pathology had revealed invasive poorly differentiated carcinoma, grade 3, ER +80% [place] +90% HER2/alex 2+ by IHC, positive by FISH, the patient had on 6/20/2022 CT scans of the chest, abdomen and pelvis done, revealing enlarged left axillary lymph node measuring just under 2 cm, no evidence of distant metastatic disease, she had a 2D echocardiogram done on 8/28/2022, revealing normal LVEF at 70%, patient was under the care of Dr. Arlette Zelaya, she received the first cycle of TCHP on 8/22/2022, the course was complicated by C. difficile infection which was diagnosed on 9/8/2022. She completed the course of oral vancomycin. The patient had just started to feel rare and like herself again, the diarrhea had resolved. The breast mass had decreased in size. The patient received Rollo El on 10/31/2022.   On 11/1/2022, the patient received the second cycle of chemotherapy, TCH, the patient was having brain fog after the chemotherapy, brain MRI was done on 11/25/2022, revealing diffuse wall thickening and FLAIR hyperintensity of the dura, with progression, differential diagnoses include but are not limited to intracranial hypertension, meningitis, neurosarcoidosis, lymphoma, hypertrophic pachymeningitis, meningeal metastasis and postoperative reaction, referral was placed to neuro and neurosurgery for evaluation. The patient was seen by neurology, was recommended brain MRI with and without contrast, she did not have it done yet. The brain fog had resolved. The patient had more fatigue with the last treatment, also epistaxis. Review of Systems;  CONSTITUTIONAL: No fever, chills. Decreased appetite and energy level. ENMT: Eyes: No diplopia; Nose: Positive for epistaxis. Mouth: No sore throat. RESPIRATORY: No hemoptysis, shortness of breath, cough. CARDIOVASCULAR: No chest pain, palpitations. GASTROINTESTINAL: No nausea/vomiting, abdominal pain, diarrhea/constipation. GENITOURINARY: No dysuria, urinary frequency, hematuria. NEURO: No syncope, presyncope, headache. Remainder:  ROS NEGATIVE    Past Medical History:      Diagnosis Date    Anxiety     Hypertension     Mitral valve prolapse     Thyroid disease      Patient Active Problem List   Diagnosis    Right cataract    Personal history of breast cancer    Recurrent Clostridium difficile diarrhea        Past Surgical History:      Procedure Laterality Date    CATARACT REMOVAL WITH IMPLANT Right 06/23/2020    HYSTERECTOMY (CERVIX STATUS UNKNOWN)      INTRACAPSULAR CATARACT EXTRACTION Right 6/23/2020    RIGHT EYE CATARACT EMULSIFICATION IOL IMPLANT performed by Lam Eubanks MD at [address] [place] [address]       Family History:  Family History   Problem Relation Age of Onset    Hypertension Mother     Heart Attack Father         26    Lung Cancer Brother 76    Heart Disease Brother        Medications:  Reviewed and reconciled.     Social History:  Social History     Socioeconomic History    Marital status:      Spouse name: Not on file    Number of children: Not on file    Years of education: Not on file    Highest education level: Not on file   Occupational History    Not on file   Tobacco Use    Smoking status: Former     Types: Cigarettes     Quit date: 1/1/1971 Years since quittin.1    Smokeless tobacco: Never   Vaping Use    Vaping Use: Never used   Substance and Sexual Activity    Alcohol use: Never    Drug use: Never    Sexual activity: Not on file   Other Topics Concern    Not on file   Social History Narrative    Not on file     Social Determinants of Health     Financial Resource Strain: Not on file   Food Insecurity: Not on file   Transportation Needs: Not on file   Physical Activity: Not on file   Stress: Not on file   Social Connections: Not on file   Intimate Partner Violence: Not on file   Housing Stability: Not on file       Allergies: Allergies   Allergen Reactions    Adhesive Tape Itching     rash    Medrol [Methylprednisolone] Other (See Comments)     States \" out of body experience\"    Codeine Nausea And Vomiting    Darvocet A500 [Propoxyphene N-Acetaminophen] Nausea And Vomiting    Sulfa Antibiotics Nausea And Vomiting       Physical Exam:  BP (!) 125/58   Pulse 71   Temp 97.9 °F (36.6 °C)   Ht 5' 4\" (1.626 m)   Wt 175 lb 6.4 oz (79.6 kg)   SpO2 98%   BMI 30.11 kg/m²   GENERAL: Alert, oriented x 3, not in acute distress. HEENT: PERRLA; EOMI. Oropharynx clear. NECK: Supple. No palpable cervical or supraclavicular lymphadenopathy. No neck rigidity. LUNGS: Good air entry bilaterally. No wheezing, crackles or rhonchi. CARDIOVASCULAR: Regular rate. No murmurs, rubs or gallops. BREASTS:  left breast exam is remarkable for significant decrease of the size of the mass. Fullness in the left axilla. ABDOMEN: Soft. Non-tender, non-distended. Positive bowel sounds. EXTREMITIES: Without clubbing, cyanosis, or edema. NEUROLOGIC: No focal deficits.      ECOG PS 1    Impression/Plan:     Mrs. Debra King is a 47-year-old lady, with a past medical history significant for anxiety, hypothyroidism, and peripheral neuropathy, had presented with an abnormal screening mammogram from 2022, it had revealed new 36 mm mass in the upper outer left breast and 19 mm lymph node in the left axilla, on 5/27/2022 she underwent an ultrasound-guided left breast mass core biopsy, pathology had revealed invasive poorly differentiated carcinoma, grade 3, ER +80% [place] +90% HER2/alex 2+ by IHC, positive by FISH, the patient had on 6/20/2022 CT scans of the chest, abdomen and pelvis done, revealing enlarged left axillary lymph node measuring just under 2 cm, no evidence of distant metastatic disease, she had a 2D echocardiogram done on 8/28/2022, revealing normal LVEF at 70%, patient was under the care of Dr. Harish Bah, she received the first cycle of TCHP on 8/22/2022, the course was complicated by C. difficile infection which was diagnosed on 9/8/2022. She completed the course of oral vancomycin. The patient had just started to feel rare and like herself again, the diarrhea had resolved. The breast mass had decreased in size. Patient has T2 N1 M0 breast cancer, hormone receptor and HER2/alex positive, prognosis was discussed with the patient, amended to continue the neoadjuvant therapy, with the second cycle we will continue with [facility], if she does well we can reintroduce Perjeta with the subsequent cycles. Recommended evaluation by ID prior to restarting the treatment, the patient was seen by Marylu Orozco NP, was recommended Zinplava infusion, which she had completed. On 11/1/2020, the patient received [facility] with dose adjustment for better tolerance, with a plan to add Perjeta for the subsequent cycles if she does well with this treatment. the patient was having brain fog after the chemotherapy, MRI was ordered, was done at [facility], the brain fog had significantly improved, no fever. No other neurological symptoms.   Brain MRI was done on 11/25/2022, revealing diffuse wall thickening and FLAIR hyperintensity of the dura, with progression, differential diagnoses include but are not limited to intracranial hypertension, meningitis, neurosarcoidosis, lymphoma, hypertrophic pachymeningitis, meningeal metastasis and postoperative reaction, referral was placed to neuro and neurosurgery for evaluation. The patient was seen by neuro, was recommended brain MRI with and without contrast.  We discussed that she has any neurological symptoms to go to the ED. the patient did not want any treatment until after the holidays, she is agreeable to resume [address], no Perjeta or steroids. Today 2/10/2023, the patient is doing well clinically, labs reviewed, blood are counts adequate for treatment, plan is for [address], cycle #4, the patient would like to keep holding the [facility]. The patient would like to delay the chemotherapy by 1 week, will reschedule for next Friday. Echocardiogram was done on 2/8/2023, revealing normal LVEF. Adjuvant endocrine therapy and radiation therapy will be recommended. She would like to have her surgery done with Dr. Diego Walls. All her questions were answered to her apparent satisfaction. We will call the neurology office to help her with MRI scheduling and for follow-up visit. Follow-up with palliative medicine. RTC in 1 week. Thank you for allowing us to participate in the care of Mrs. Nicolasa Don.     Leidy Vaughn MD   HEMATOLOGY/MEDICAL 150 [facility]  [address] MED ONCOLOGY  [address] [place] [id number]  Dept: 71 Kristi Kingsley: [phone]

## 2023-02-17 ENCOUNTER — HOSPITAL ENCOUNTER (OUTPATIENT)
Dept: INFUSION THERAPY | Age: 73
End: 2023-02-17

## 2023-02-20 ENCOUNTER — HOSPITAL ENCOUNTER (OUTPATIENT)
Dept: INFUSION THERAPY | Age: 73
Discharge: HOME | End: 2023-02-20
Payer: MEDICARE

## 2023-02-20 DIAGNOSIS — Z85.3 PERSONAL HISTORY OF BREAST CANCER: Primary | ICD-10-CM

## 2023-02-20 LAB
ALBUMIN SERPL-MCNC: 3.8 G/DL (ref 3.5–5.2)
ALP BLD-CCNC: 119 U/L (ref 35–104)
ALT SERPL-CCNC: 8 U/L (ref 0–32)
ANION GAP SERPL CALCULATED.3IONS-SCNC: 9 MMOL/L (ref 7–16)
AST SERPL-CCNC: 15 U/L (ref 0–31)
BASOPHILS ABSOLUTE: 0.04 E9/L (ref 0–0.2)
BASOPHILS RELATIVE PERCENT: 1 % (ref 0–2)
BILIRUB SERPL-MCNC: 0.4 MG/DL (ref 0–1.2)
BUN BLDV-MCNC: 9 MG/DL (ref 6–23)
CALCIUM SERPL-MCNC: 9 MG/DL (ref 8.6–10.2)
CHLORIDE BLD-SCNC: 105 MMOL/L (ref 98–107)
CO2: 26 MMOL/L (ref 22–29)
CREAT SERPL-MCNC: 0.7 MG/DL (ref 0.5–1)
EOSINOPHILS ABSOLUTE: 0.19 E9/L (ref 0.05–0.5)
EOSINOPHILS RELATIVE PERCENT: 4.8 % (ref 0–6)
GFR SERPL CREATININE-BSD FRML MDRD: >60 ML/MIN/1.73
GLUCOSE BLD-MCNC: 141 MG/DL (ref 74–99)
HCT VFR BLD CALC: 32.6 % (ref 34–48)
HEMOGLOBIN: 10.8 G/DL (ref 11.5–15.5)
IMMATURE GRANULOCYTES #: 0 E9/L
IMMATURE GRANULOCYTES %: 0 % (ref 0–5)
LYMPHOCYTES ABSOLUTE: 1.3 E9/L (ref 1.5–4)
LYMPHOCYTES RELATIVE PERCENT: 33.2 % (ref 20–42)
MAGNESIUM: 1.8 MG/DL (ref 1.6–2.6)
MCH RBC QN AUTO: 30.4 PG (ref 26–35)
MCHC RBC AUTO-ENTMCNC: 33.1 % (ref 32–34.5)
MCV RBC AUTO: 91.8 FL (ref 80–99.9)
MONOCYTES ABSOLUTE: 0.39 E9/L (ref 0.1–0.95)
MONOCYTES RELATIVE PERCENT: 9.9 % (ref 2–12)
NEUTROPHILS ABSOLUTE: 2 E9/L (ref 1.8–7.3)
NEUTROPHILS RELATIVE PERCENT: 51.1 % (ref 43–80)
PDW BLD-RTO: 14.6 FL (ref 11.5–15)
PLATELET # BLD: 136 E9/L (ref 130–450)
PMV BLD AUTO: 8.9 FL (ref 7–12)
POTASSIUM SERPL-SCNC: 4 MMOL/L (ref 3.5–5)
RBC # BLD: 3.55 E12/L (ref 3.5–5.5)
SODIUM BLD-SCNC: 140 MMOL/L (ref 132–146)
TOTAL PROTEIN: 6.3 G/DL (ref 6.4–8.3)
WBC # BLD: 3.9 E9/L (ref 4.5–11.5)

## 2023-02-20 PROCEDURE — 80053 COMPREHEN METABOLIC PANEL: CPT

## 2023-02-20 PROCEDURE — 83735 ASSAY OF MAGNESIUM: CPT

## 2023-02-20 PROCEDURE — 6360000002 HC RX W HCPCS: Performed by: INTERNAL MEDICINE

## 2023-02-20 PROCEDURE — 36591 DRAW BLOOD OFF VENOUS DEVICE: CPT

## 2023-02-20 PROCEDURE — 85025 COMPLETE CBC W/AUTO DIFF WBC: CPT

## 2023-02-20 PROCEDURE — 2580000003 HC RX 258: Performed by: INTERNAL MEDICINE

## 2023-02-20 RX ORDER — HEPARIN SODIUM (PORCINE) LOCK FLUSH IV SOLN 100 UNIT/ML 100 UNIT/ML
500 SOLUTION INTRAVENOUS PRN
Status: DISCONTINUED | OUTPATIENT
Start: 2023-02-20 | End: 2023-02-21 | Stop reason: HOSPADM

## 2023-02-20 RX ORDER — HEPARIN SODIUM (PORCINE) LOCK FLUSH IV SOLN 100 UNIT/ML 100 UNIT/ML
500 SOLUTION INTRAVENOUS PRN
OUTPATIENT
Start: 2023-02-20

## 2023-02-20 RX ORDER — SODIUM CHLORIDE 0.9 % (FLUSH) 0.9 %
5-40 SYRINGE (ML) INJECTION PRN
OUTPATIENT
Start: 2023-02-20

## 2023-02-20 RX ORDER — SODIUM CHLORIDE 9 MG/ML
25 INJECTION, SOLUTION INTRAVENOUS PRN
OUTPATIENT
Start: 2023-02-20

## 2023-02-20 RX ORDER — SODIUM CHLORIDE 0.9 % (FLUSH) 0.9 %
5-40 SYRINGE (ML) INJECTION PRN
Status: DISCONTINUED | OUTPATIENT
Start: 2023-02-20 | End: 2023-02-21 | Stop reason: HOSPADM

## 2023-02-20 RX ADMIN — SODIUM CHLORIDE, PRESERVATIVE FREE 10 ML: 5 INJECTION INTRAVENOUS at 13:40

## 2023-02-20 RX ADMIN — SODIUM CHLORIDE, PRESERVATIVE FREE 10 ML: 5 INJECTION INTRAVENOUS at 13:38

## 2023-02-20 RX ADMIN — HEPARIN 500 UNITS: 100 SYRINGE at 13:40

## 2023-02-21 ENCOUNTER — HOSPITAL ENCOUNTER (OUTPATIENT)
Dept: INFUSION THERAPY | Age: 73
Discharge: HOME | End: 2023-02-21
Payer: MEDICARE

## 2023-02-21 ENCOUNTER — OFFICE VISIT (OUTPATIENT)
Dept: ONCOLOGY | Age: 73
End: 2023-02-21
Payer: MEDICARE

## 2023-02-21 VITALS
RESPIRATION RATE: 16 BRPM | OXYGEN SATURATION: 97 % | SYSTOLIC BLOOD PRESSURE: 129 MMHG | DIASTOLIC BLOOD PRESSURE: 68 MMHG | TEMPERATURE: 97.6 F | HEART RATE: 64 BPM

## 2023-02-21 VITALS
BODY MASS INDEX: 29.88 KG/M2 | TEMPERATURE: 97.2 F | WEIGHT: 175 LBS | SYSTOLIC BLOOD PRESSURE: 138 MMHG | HEART RATE: 74 BPM | DIASTOLIC BLOOD PRESSURE: 64 MMHG | HEIGHT: 64 IN | OXYGEN SATURATION: 97 %

## 2023-02-21 DIAGNOSIS — Z85.3 PERSONAL HISTORY OF BREAST CANCER: Primary | ICD-10-CM

## 2023-02-21 PROCEDURE — G8417 CALC BMI ABV UP PARAM F/U: HCPCS | Performed by: INTERNAL MEDICINE

## 2023-02-21 PROCEDURE — 3017F COLORECTAL CA SCREEN DOC REV: CPT | Performed by: INTERNAL MEDICINE

## 2023-02-21 PROCEDURE — 96417 CHEMO IV INFUS EACH ADDL SEQ: CPT

## 2023-02-21 PROCEDURE — 96375 TX/PRO/DX INJ NEW DRUG ADDON: CPT

## 2023-02-21 PROCEDURE — 1090F PRES/ABSN URINE INCON ASSESS: CPT | Performed by: INTERNAL MEDICINE

## 2023-02-21 PROCEDURE — 2580000003 HC RX 258: Performed by: INTERNAL MEDICINE

## 2023-02-21 PROCEDURE — G8484 FLU IMMUNIZE NO ADMIN: HCPCS | Performed by: INTERNAL MEDICINE

## 2023-02-21 PROCEDURE — 6360000002 HC RX W HCPCS: Performed by: INTERNAL MEDICINE

## 2023-02-21 PROCEDURE — 1036F TOBACCO NON-USER: CPT | Performed by: INTERNAL MEDICINE

## 2023-02-21 PROCEDURE — 96413 CHEMO IV INFUSION 1 HR: CPT

## 2023-02-21 PROCEDURE — G8427 DOCREV CUR MEDS BY ELIG CLIN: HCPCS | Performed by: INTERNAL MEDICINE

## 2023-02-21 PROCEDURE — 6370000000 HC RX 637 (ALT 250 FOR IP): Performed by: INTERNAL MEDICINE

## 2023-02-21 PROCEDURE — 96377 APPLICATON ON-BODY INJECTOR: CPT

## 2023-02-21 PROCEDURE — 96367 TX/PROPH/DG ADDL SEQ IV INF: CPT

## 2023-02-21 PROCEDURE — 1123F ACP DISCUSS/DSCN MKR DOCD: CPT | Performed by: INTERNAL MEDICINE

## 2023-02-21 PROCEDURE — G8400 PT W/DXA NO RESULTS DOC: HCPCS | Performed by: INTERNAL MEDICINE

## 2023-02-21 PROCEDURE — 99214 OFFICE O/P EST MOD 30 MIN: CPT | Performed by: INTERNAL MEDICINE

## 2023-02-21 RX ORDER — SODIUM CHLORIDE 0.9 % (FLUSH) 0.9 %
5-40 SYRINGE (ML) INJECTION PRN
Status: DISCONTINUED | OUTPATIENT
Start: 2023-02-21 | End: 2023-02-22 | Stop reason: HOSPADM

## 2023-02-21 RX ORDER — DIPHENHYDRAMINE HYDROCHLORIDE 50 MG/ML
50 INJECTION INTRAMUSCULAR; INTRAVENOUS
Status: COMPLETED | OUTPATIENT
Start: 2023-02-21 | End: 2023-02-21

## 2023-02-21 RX ORDER — ACETAMINOPHEN 325 MG/1
650 TABLET ORAL
Status: COMPLETED | OUTPATIENT
Start: 2023-02-21 | End: 2023-02-21

## 2023-02-21 RX ORDER — PALONOSETRON HYDROCHLORIDE 0.05 MG/ML
0.25 INJECTION, SOLUTION INTRAVENOUS ONCE
Status: COMPLETED | OUTPATIENT
Start: 2023-02-21 | End: 2023-02-21

## 2023-02-21 RX ORDER — HEPARIN SODIUM (PORCINE) LOCK FLUSH IV SOLN 100 UNIT/ML 100 UNIT/ML
500 SOLUTION INTRAVENOUS PRN
Status: DISCONTINUED | OUTPATIENT
Start: 2023-02-21 | End: 2023-02-22 | Stop reason: HOSPADM

## 2023-02-21 RX ORDER — SODIUM CHLORIDE 9 MG/ML
5-250 INJECTION, SOLUTION INTRAVENOUS PRN
Status: DISCONTINUED | OUTPATIENT
Start: 2023-02-21 | End: 2023-02-22 | Stop reason: HOSPADM

## 2023-02-21 RX ADMIN — ACETAMINOPHEN 650 MG: 325 TABLET ORAL at 10:07

## 2023-02-21 RX ADMIN — SODIUM CHLORIDE, PRESERVATIVE FREE 10 ML: 5 INJECTION INTRAVENOUS at 13:46

## 2023-02-21 RX ADMIN — CARBOPLATIN 500 MG: 10 INJECTION, SOLUTION INTRAVENOUS at 12:58

## 2023-02-21 RX ADMIN — SODIUM CHLORIDE, PRESERVATIVE FREE 10 ML: 5 INJECTION INTRAVENOUS at 10:12

## 2023-02-21 RX ADMIN — FOSAPREPITANT 150 MG: 150 INJECTION, POWDER, LYOPHILIZED, FOR SOLUTION INTRAVENOUS at 10:15

## 2023-02-21 RX ADMIN — HEPARIN 500 UNITS: 100 SYRINGE at 13:46

## 2023-02-21 RX ADMIN — SODIUM CHLORIDE 483 MG: 9 INJECTION, SOLUTION INTRAVENOUS at 10:56

## 2023-02-21 RX ADMIN — PEGFILGRASTIM 6 MG: KIT SUBCUTANEOUS at 13:48

## 2023-02-21 RX ADMIN — DIPHENHYDRAMINE HYDROCHLORIDE 50 MG: 50 INJECTION, SOLUTION INTRAMUSCULAR; INTRAVENOUS at 10:12

## 2023-02-21 RX ADMIN — SODIUM CHLORIDE 25 ML/HR: 9 INJECTION, SOLUTION INTRAVENOUS at 10:06

## 2023-02-21 RX ADMIN — DOCETAXEL 140 MG: 20 INJECTION, SOLUTION INTRAVENOUS at 11:44

## 2023-02-21 RX ADMIN — PALONOSETRON 0.25 MG: 0.25 INJECTION, SOLUTION INTRAVENOUS at 10:08

## 2023-02-21 RX ADMIN — SODIUM CHLORIDE, PRESERVATIVE FREE 10 ML: 5 INJECTION INTRAVENOUS at 10:00

## 2023-02-21 ASSESSMENT — PAIN SCALES - GENERAL: PAINLEVEL_OUTOF10: 0

## 2023-02-21 NOTE — PROGRESS NOTES
701  [facility] MED ONCOLOGY  [address] [facility] [address]. [id number]  Dept: Jani Kristi Sancho: [phone]  Attending progress note      Reason for Visit:   Left breast cancer. Referring Physician: Dr. Missy Naranjo. PCP:  Connie Barksdale MD    History of Present Illness:     Mrs. Janett Ramirez is a 78-year-old lady, with a past medical history significant for anxiety, hypothyroidism, and peripheral neuropathy, had presented with an abnormal screening mammogram from 5/24/2022, it had revealed new 3 6 mm mass in the upper outer left breast and 19 mm lymph node in the left axilla, on 5/27/2022 she underwent an ultrasound-guided left breast mass core biopsy, pathology had revealed invasive poorly differentiated carcinoma, grade 3, ER +80% [place] +90% HER2/alex 2+ by IHC, positive by FISH, the patient had on 6/20/2022 CT scans of the chest, abdomen and pelvis done, revealing enlarged left axillary lymph node measuring just under 2 cm, no evidence of distant metastatic disease, she had a 2D echocardiogram done on 8/28/2022, revealing normal LVEF at 70%, patient was under the care of Dr. Missy Naranjo, she received the first cycle of TCHP on 8/22/2022, the course was complicated by C. difficile infection which was diagnosed on 9/8/2022. She completed the course of oral vancomycin. The patient had just started to feel rare and like herself again, the diarrhea had resolved. The breast mass had decreased in size. The patient received Ceci [place] on 10/31/2022.   On 11/1/2022, the patient received the second cycle of chemotherapy, TCH, the patient was having brain fog after the chemotherapy, brain MRI was done on 11/25/2022, revealing diffuse wall thickening and FLAIR hyperintensity of the dura, with progression, differential diagnoses include but are not limited to intracranial hypertension, meningitis, neurosarcoidosis, lymphoma, hypertrophic pachymeningitis, meningeal metastasis and postoperative reaction, referral was placed to neuro and neurosurgery for evaluation. The patient was seen by neurology, was recommended brain MRI with and without contrast, she did not have it done yet. The brain fog had resolved. The patient feels that she is taking more time to recover from the chemotherapy, she is having epistaxis, feeling tired. Review of Systems;  CONSTITUTIONAL: No fever, chills. Decreased appetite and energy level. ENMT: Eyes: No diplopia; Nose: Positive for epistaxis. Mouth: No sore throat. RESPIRATORY: No hemoptysis, shortness of breath, cough. CARDIOVASCULAR: No chest pain, palpitations. GASTROINTESTINAL: No nausea/vomiting, abdominal pain, diarrhea/constipation. GENITOURINARY: No dysuria, urinary frequency, hematuria. NEURO: No syncope, presyncope, headache. Remainder:  ROS NEGATIVE    Past Medical History:      Diagnosis Date    Anxiety     Hypertension     Mitral valve prolapse     Thyroid disease      Patient Active Problem List   Diagnosis    Right cataract    Personal history of breast cancer    Recurrent Clostridium difficile diarrhea        Past Surgical History:      Procedure Laterality Date    CATARACT REMOVAL WITH IMPLANT Right 06/23/2020    HYSTERECTOMY (CERVIX STATUS UNKNOWN)      INTRACAPSULAR CATARACT EXTRACTION Right 6/23/2020    RIGHT EYE CATARACT EMULSIFICATION IOL IMPLANT performed by Johnson Rosa MD at [address] [place] [address]       Family History:  Family History   Problem Relation Age of Onset    Hypertension Mother     Heart Attack Father         26    Lung Cancer Brother 76    Heart Disease Brother        Medications:  Reviewed and reconciled.     Social History:  Social History     Socioeconomic History    Marital status:      Spouse name: Not on file    Number of children: Not on file    Years of education: Not on file    Highest education level: Not on file   Occupational History    Not on file   Tobacco Use    Smoking status: Former Types: Cigarettes     Quit date: 1971     Years since quittin.1    Smokeless tobacco: Never   Vaping Use    Vaping Use: Never used   Substance and Sexual Activity    Alcohol use: Never    Drug use: Never    Sexual activity: Not on file   Other Topics Concern    Not on file   Social History Narrative    Not on file     Social Determinants of Health     Financial Resource Strain: Not on file   Food Insecurity: Not on file   Transportation Needs: Not on file   Physical Activity: Not on file   Stress: Not on file   Social Connections: Not on file   Intimate Partner Violence: Not on file   Housing Stability: Not on file       Allergies: Allergies   Allergen Reactions    Adhesive Tape Itching     rash    Medrol [Methylprednisolone] Other (See Comments)     States \" out of body experience\"    Codeine Nausea And Vomiting    Darvocet A500 [Propoxyphene N-Acetaminophen] Nausea And Vomiting    Sulfa Antibiotics Nausea And Vomiting       Physical Exam:  /64   Pulse 74   Temp 97.2 °F (36.2 °C)   Ht 5' 4\" (1.626 m)   Wt 175 lb (79.4 kg)   SpO2 97%   BMI 30.04 kg/m²   GENERAL: Alert, oriented x 3, not in acute distress. HEENT: PERRLA; EOMI. Oropharynx clear. NECK: Supple. No palpable cervical or supraclavicular lymphadenopathy. No neck rigidity. LUNGS: Good air entry bilaterally. No wheezing, crackles or rhonchi. CARDIOVASCULAR: Regular rate. No murmurs, rubs or gallops. BREASTS:  left breast exam is remarkable for significant decrease of the size of the mass. Fullness in the left axilla. ABDOMEN: Soft. Non-tender, non-distended. Positive bowel sounds. EXTREMITIES: Without clubbing, cyanosis, or edema. NEUROLOGIC: No focal deficits.      ECOG PS 1    Impression/Plan:     Mrs. Paula Hawthorne is a 78-year-old lady, with a past medical history significant for anxiety, hypothyroidism, and peripheral neuropathy, had presented with an abnormal screening mammogram from 2022, it had revealed new 36 mm mass in the upper outer left breast and 19 mm lymph node in the left axilla, on 5/27/2022 she underwent an ultrasound-guided left breast mass core biopsy, pathology had revealed invasive poorly differentiated carcinoma, grade 3, ER +80% [place] +90% HER2/alex 2+ by IHC, positive by FISH, the patient had on 6/20/2022 CT scans of the chest, abdomen and pelvis done, revealing enlarged left axillary lymph node measuring just under 2 cm, no evidence of distant metastatic disease, she had a 2D echocardiogram done on 8/28/2022, revealing normal LVEF at 70%, patient was under the care of Dr. Clarissa Barajas, she received the first cycle of TCHP on 8/22/2022, the course was complicated by C. difficile infection which was diagnosed on 9/8/2022. She completed the course of oral vancomycin. The patient had just started to feel rare and like herself again, the diarrhea had resolved. The breast mass had decreased in size. Patient has T2 N1 M0 breast cancer, hormone receptor and HER2/alex positive, prognosis was discussed with the patient, amended to continue the neoadjuvant therapy, with the second cycle we will continue with [facility], if she does well we can reintroduce Perjeta with the subsequent cycles. Recommended evaluation by ID prior to restarting the treatment, the patient was seen by Dayana Parra NP, was recommended Zinplava infusion, which she had completed. On 11/1/2020, the patient received [facility] with dose adjustment for better tolerance, with a plan to add Perjeta for the subsequent cycles if she does well with this treatment. the patient was having brain fog after the chemotherapy, MRI was ordered, was done at [facility], the brain fog had significantly improved, no fever. No other neurological symptoms.   Brain MRI was done on 11/25/2022, revealing diffuse wall thickening and FLAIR hyperintensity of the dura, with progression, differential diagnoses include but are not limited to intracranial hypertension, meningitis, neurosarcoidosis, lymphoma, hypertrophic pachymeningitis, meningeal metastasis and postoperative reaction, referral was placed to neuro and neurosurgery for evaluation. The patient was seen by neuro, was recommended brain MRI with and without contrast.  We discussed that she has any neurological symptoms to go to the ED. the patient did not want any treatment until after the holidays, she is agreeable to resume [address], no Perjeta or steroids. Today 2/21/2023, the patient is doing well clinically, labs reviewed, blood are counts are adequate for treatment, proceed with [facility], cycle #4, the patient would like to keep holding the [facility]. Steroids were discontinued. The patient would like some more time to recover after the chemotherapy, tentatively will have the chemotherapy in 4 rather than 3 weeks. Echocardiogram was done on 2/8/2023, revealing normal LVEF. Adjuvant endocrine therapy and radiation therapy will be recommended. She would like to have her surgery done with Dr. Yany Granados. Discussed with the staff, we will call the neurology office to help her with MRI scheduling and for follow-up visit. Follow-up with palliative medicine. Epistaxis, platelet count is normal, the patient will follow-up with Dr. Trudi Medina. RTC in 4 weeks. Thank you for allowing us to participate in the care of Mrs. Nancie Claude.     Jermaine Bryant MD   HEMATOLOGY/MEDICAL [address] MED ONCOLOGY  [address] [place] [id number]  Dept: 71 Kristi Kingsley: [phone]

## 2023-02-21 NOTE — PROGRESS NOTES
Patient tolerated chemotherapy infusions well. Remained on unit for 15 minutes after treatment. Patient alert and oriented x3. No distress noted. Vital signs stable. Patient denies any new or worsening pain. Educated patient on possible side effects and treatment of medication. Patient verbalized understanding. Offered patient education and/or discharge material. Patient declined. Patient denies any needs. All questions answered. D/C in stable condition.

## 2023-02-24 ENCOUNTER — TELEPHONE (OUTPATIENT)
Dept: INFUSION THERAPY | Age: 73
End: 2023-02-24

## 2023-02-24 NOTE — TELEPHONE ENCOUNTER
Reached out to patient to follow up on MRI with contrast . Patient states that she does not want steroid that [facility] was requesting as protocol for contrast. Mark Walker from [facility] Urias's office states they have tried several attempts to have MRI done and offered lumbar puncture and patient has refused.  Patient states she will call [facility] and find out if there is something else she can take for contrast and reach out to [facility]  office to have it rescheduled

## 2023-02-27 ENCOUNTER — TELEPHONE (OUTPATIENT)
Dept: INFUSION THERAPY | Age: 73
End: 2023-02-27

## 2023-02-27 NOTE — TELEPHONE ENCOUNTER
Patient has called and stated has not had BM for 5 days. Patient states she had taken senokot with no results. After reviewed with RUBENS [email or web address]., patient may take Milk of Mag starting with 1/3 dose.  Patient states understanding

## 2023-03-02 ENCOUNTER — HOSPITAL ENCOUNTER (OUTPATIENT)
Dept: INFUSION THERAPY | Age: 73
Discharge: HOME | End: 2023-03-02
Payer: MEDICARE

## 2023-03-02 VITALS
DIASTOLIC BLOOD PRESSURE: 82 MMHG | SYSTOLIC BLOOD PRESSURE: 126 MMHG | TEMPERATURE: 98 F | RESPIRATION RATE: 16 BRPM | HEART RATE: 73 BPM

## 2023-03-02 DIAGNOSIS — Z85.3 PERSONAL HISTORY OF BREAST CANCER: Primary | ICD-10-CM

## 2023-03-02 PROCEDURE — 6360000002 HC RX W HCPCS: Performed by: INTERNAL MEDICINE

## 2023-03-02 PROCEDURE — 2580000003 HC RX 258: Performed by: INTERNAL MEDICINE

## 2023-03-02 PROCEDURE — 96360 HYDRATION IV INFUSION INIT: CPT

## 2023-03-02 RX ORDER — SODIUM CHLORIDE 9 MG/ML
5-250 INJECTION, SOLUTION INTRAVENOUS PRN
OUTPATIENT
Start: 2023-03-02

## 2023-03-02 RX ORDER — 0.9 % SODIUM CHLORIDE 0.9 %
1000 INTRAVENOUS SOLUTION INTRAVENOUS ONCE
Status: COMPLETED | OUTPATIENT
Start: 2023-03-02 | End: 2023-03-02

## 2023-03-02 RX ORDER — SODIUM CHLORIDE 0.9 % (FLUSH) 0.9 %
5-40 SYRINGE (ML) INJECTION PRN
Status: DISCONTINUED | OUTPATIENT
Start: 2023-03-02 | End: 2023-03-03 | Stop reason: HOSPADM

## 2023-03-02 RX ORDER — HEPARIN SODIUM (PORCINE) LOCK FLUSH IV SOLN 100 UNIT/ML 100 UNIT/ML
500 SOLUTION INTRAVENOUS PRN
OUTPATIENT
Start: 2023-03-02

## 2023-03-02 RX ORDER — HEPARIN SODIUM (PORCINE) LOCK FLUSH IV SOLN 100 UNIT/ML 100 UNIT/ML
500 SOLUTION INTRAVENOUS PRN
Status: DISCONTINUED | OUTPATIENT
Start: 2023-03-02 | End: 2023-03-03 | Stop reason: HOSPADM

## 2023-03-02 RX ORDER — 0.9 % SODIUM CHLORIDE 0.9 %
1000 INTRAVENOUS SOLUTION INTRAVENOUS ONCE
Status: CANCELLED | OUTPATIENT
Start: 2023-03-02 | End: 2023-03-02

## 2023-03-02 RX ORDER — SODIUM CHLORIDE 0.9 % (FLUSH) 0.9 %
5-40 SYRINGE (ML) INJECTION PRN
OUTPATIENT
Start: 2023-03-02

## 2023-03-02 RX ADMIN — SODIUM CHLORIDE 1000 ML: 9 INJECTION, SOLUTION INTRAVENOUS at 14:26

## 2023-03-02 RX ADMIN — HEPARIN 500 UNITS: 100 SYRINGE at 15:34

## 2023-03-02 RX ADMIN — SODIUM CHLORIDE, PRESERVATIVE FREE 10 ML: 5 INJECTION INTRAVENOUS at 15:34

## 2023-03-21 ENCOUNTER — OFFICE VISIT (OUTPATIENT)
Dept: ONCOLOGY | Age: 73
End: 2023-03-21
Payer: MEDICARE

## 2023-03-21 ENCOUNTER — HOSPITAL ENCOUNTER (OUTPATIENT)
Dept: INFUSION THERAPY | Age: 73
Discharge: HOME | End: 2023-03-21
Payer: MEDICARE

## 2023-03-21 VITALS
SYSTOLIC BLOOD PRESSURE: 130 MMHG | TEMPERATURE: 97.4 F | OXYGEN SATURATION: 97 % | HEART RATE: 80 BPM | DIASTOLIC BLOOD PRESSURE: 65 MMHG | WEIGHT: 175 LBS | BODY MASS INDEX: 29.88 KG/M2 | HEIGHT: 64 IN

## 2023-03-21 DIAGNOSIS — Z85.3 PERSONAL HISTORY OF BREAST CANCER: Primary | ICD-10-CM

## 2023-03-21 LAB
ALBUMIN SERPL-MCNC: 4 G/DL (ref 3.5–5.2)
ALP SERPL-CCNC: 111 U/L (ref 35–104)
ALT SERPL-CCNC: 12 U/L (ref 0–32)
ANION GAP SERPL CALCULATED.3IONS-SCNC: 8 MMOL/L (ref 7–16)
AST SERPL-CCNC: 18 U/L (ref 0–31)
BASOPHILS # BLD: 0.05 E9/L (ref 0–0.2)
BASOPHILS NFR BLD: 0.9 % (ref 0–2)
BILIRUB SERPL-MCNC: <0.2 MG/DL (ref 0–1.2)
BUN SERPL-MCNC: 10 MG/DL (ref 6–23)
CALCIUM SERPL-MCNC: 9.2 MG/DL (ref 8.6–10.2)
CHLORIDE SERPL-SCNC: 104 MMOL/L (ref 98–107)
CO2 SERPL-SCNC: 28 MMOL/L (ref 22–29)
CREAT SERPL-MCNC: 0.7 MG/DL (ref 0.5–1)
EOSINOPHIL # BLD: 0.07 E9/L (ref 0.05–0.5)
EOSINOPHIL NFR BLD: 1.3 % (ref 0–6)
ERYTHROCYTE [DISTWIDTH] IN BLOOD BY AUTOMATED COUNT: 16.3 FL (ref 11.5–15)
GLUCOSE SERPL-MCNC: 102 MG/DL (ref 74–99)
HCT VFR BLD AUTO: 33 % (ref 34–48)
HGB BLD-MCNC: 10.7 G/DL (ref 11.5–15.5)
IMM GRANULOCYTES # BLD: 0.02 E9/L
IMM GRANULOCYTES NFR BLD: 0.4 % (ref 0–5)
LYMPHOCYTES # BLD: 1.67 E9/L (ref 1.5–4)
LYMPHOCYTES NFR BLD: 30.8 % (ref 20–42)
MAGNESIUM SERPL-MCNC: 2 MG/DL (ref 1.6–2.6)
MCH RBC QN AUTO: 32 PG (ref 26–35)
MCHC RBC AUTO-ENTMCNC: 32.4 % (ref 32–34.5)
MCV RBC AUTO: 98.8 FL (ref 80–99.9)
MONOCYTES # BLD: 0.76 E9/L (ref 0.1–0.95)
MONOCYTES NFR BLD: 14 % (ref 2–12)
NEUTROPHILS # BLD: 2.85 E9/L (ref 1.8–7.3)
NEUTS SEG NFR BLD: 52.6 % (ref 43–80)
PLATELET # BLD AUTO: 192 E9/L (ref 130–450)
PMV BLD AUTO: 9.7 FL (ref 7–12)
POTASSIUM SERPL-SCNC: 4 MMOL/L (ref 3.5–5)
PROT SERPL-MCNC: 6.2 G/DL (ref 6.4–8.3)
RBC # BLD AUTO: 3.34 E12/L (ref 3.5–5.5)
SODIUM SERPL-SCNC: 140 MMOL/L (ref 132–146)
WBC # BLD: 5.4 E9/L (ref 4.5–11.5)

## 2023-03-21 PROCEDURE — G8484 FLU IMMUNIZE NO ADMIN: HCPCS | Performed by: INTERNAL MEDICINE

## 2023-03-21 PROCEDURE — G8427 DOCREV CUR MEDS BY ELIG CLIN: HCPCS | Performed by: INTERNAL MEDICINE

## 2023-03-21 PROCEDURE — 99214 OFFICE O/P EST MOD 30 MIN: CPT

## 2023-03-21 PROCEDURE — 80053 COMPREHEN METABOLIC PANEL: CPT

## 2023-03-21 PROCEDURE — 1090F PRES/ABSN URINE INCON ASSESS: CPT | Performed by: INTERNAL MEDICINE

## 2023-03-21 PROCEDURE — 36591 DRAW BLOOD OFF VENOUS DEVICE: CPT

## 2023-03-21 PROCEDURE — G8417 CALC BMI ABV UP PARAM F/U: HCPCS | Performed by: INTERNAL MEDICINE

## 2023-03-21 PROCEDURE — G8400 PT W/DXA NO RESULTS DOC: HCPCS | Performed by: INTERNAL MEDICINE

## 2023-03-21 PROCEDURE — 2580000003 HC RX 258: Performed by: INTERNAL MEDICINE

## 2023-03-21 PROCEDURE — 85025 COMPLETE CBC W/AUTO DIFF WBC: CPT

## 2023-03-21 PROCEDURE — 6360000002 HC RX W HCPCS: Performed by: INTERNAL MEDICINE

## 2023-03-21 PROCEDURE — 99214 OFFICE O/P EST MOD 30 MIN: CPT | Performed by: INTERNAL MEDICINE

## 2023-03-21 PROCEDURE — 1123F ACP DISCUSS/DSCN MKR DOCD: CPT | Performed by: INTERNAL MEDICINE

## 2023-03-21 PROCEDURE — 3017F COLORECTAL CA SCREEN DOC REV: CPT | Performed by: INTERNAL MEDICINE

## 2023-03-21 PROCEDURE — 1036F TOBACCO NON-USER: CPT | Performed by: INTERNAL MEDICINE

## 2023-03-21 PROCEDURE — 83735 ASSAY OF MAGNESIUM: CPT

## 2023-03-21 RX ORDER — SODIUM CHLORIDE 0.9 % (FLUSH) 0.9 %
5-40 SYRINGE (ML) INJECTION PRN
Status: DISCONTINUED | OUTPATIENT
Start: 2023-03-21 | End: 2023-03-22 | Stop reason: HOSPADM

## 2023-03-21 RX ORDER — AMOXICILLIN AND CLAVULANATE POTASSIUM 875; 125 MG/1; MG/1
TABLET, FILM COATED ORAL
COMMUNITY
Start: 2023-03-16

## 2023-03-21 RX ORDER — HEPARIN SODIUM (PORCINE) LOCK FLUSH IV SOLN 100 UNIT/ML 100 UNIT/ML
500 SOLUTION INTRAVENOUS PRN
Status: CANCELLED | OUTPATIENT
Start: 2023-03-21

## 2023-03-21 RX ORDER — HEPARIN SODIUM (PORCINE) LOCK FLUSH IV SOLN 100 UNIT/ML 100 UNIT/ML
500 SOLUTION INTRAVENOUS PRN
Status: DISCONTINUED | OUTPATIENT
Start: 2023-03-21 | End: 2023-03-22 | Stop reason: HOSPADM

## 2023-03-21 RX ORDER — SODIUM CHLORIDE 0.9 % (FLUSH) 0.9 %
5-40 SYRINGE (ML) INJECTION PRN
Status: CANCELLED | OUTPATIENT
Start: 2023-03-21

## 2023-03-21 RX ORDER — SODIUM CHLORIDE 9 MG/ML
25 INJECTION, SOLUTION INTRAVENOUS PRN
OUTPATIENT
Start: 2023-03-21

## 2023-03-21 RX ADMIN — SODIUM CHLORIDE, PRESERVATIVE FREE 10 ML: 5 INJECTION INTRAVENOUS at 09:25

## 2023-03-21 RX ADMIN — HEPARIN 500 UNITS: 100 SYRINGE at 09:27

## 2023-03-21 RX ADMIN — SODIUM CHLORIDE, PRESERVATIVE FREE 10 ML: 5 INJECTION INTRAVENOUS at 09:27

## 2023-03-21 NOTE — PROGRESS NOTES
Patient provided with discharge instructions, received printed AVS.  All questions answered. Patient understands follow up plan of care.
with a past medical history significant for anxiety, hypothyroidism, and peripheral neuropathy, had presented with an abnormal screening mammogram from 5/24/2022, it had revealed new 36 mm mass in the upper outer left breast and 19 mm lymph node in the left axilla, on 5/27/2022 she underwent an ultrasound-guided left breast mass core biopsy, pathology had revealed invasive poorly differentiated carcinoma, grade 3, ER +80% [place] +90% HER2/alex 2+ by IHC, positive by FISH, the patient had on 6/20/2022 CT scans of the chest, abdomen and pelvis done, revealing enlarged left axillary lymph node measuring just under 2 cm, no evidence of distant metastatic disease, she had a 2D echocardiogram done on 8/28/2022, revealing normal LVEF at 70%, patient was under the care of Dr. Renita Ahumada, she received the first cycle of TCHP on 8/22/2022, the course was complicated by C. difficile infection which was diagnosed on 9/8/2022. She completed the course of oral vancomycin. The patient had just started to feel rare and like herself again, the diarrhea had resolved. The breast mass had decreased in size. Patient has T2 N1 M0 breast cancer, hormone receptor and HER2/alex positive, prognosis was discussed with the patient, amended to continue the neoadjuvant therapy, with the second cycle we will continue with [facility], if she does well we can reintroduce Perjeta with the subsequent cycles. Recommended evaluation by ID prior to restarting the treatment, the patient was seen by Perez Burch NP, was recommended Zinplava infusion, which she had completed. On 11/1/2020, the patient received [facility] with dose adjustment for better tolerance, with a plan to add Perjeta for the subsequent cycles if she does well with this treatment. the patient was having brain fog after the chemotherapy, MRI was ordered, was done at [facility], the brain fog had significantly improved, no fever. No other neurological symptoms.   Brain MRI was done on 11/25/2022,

## 2023-03-28 DIAGNOSIS — Z85.3 PERSONAL HISTORY OF BREAST CANCER: Primary | ICD-10-CM

## 2023-03-28 NOTE — PROGRESS NOTES
Patient called in confused about her scans and treatment. Per Dr. Luan White progress note form 3/14/23 patient was to have an ultrasound of the left breast and axilla along with a CT of the chest. Per dr. Brenda Gustafson  these tests were ordered. This nurse explained Immunotherapy [place] and the importance of it. Patient verbalized understanding.

## 2023-03-31 ENCOUNTER — HOSPITAL ENCOUNTER (OUTPATIENT)
Dept: INFUSION THERAPY | Age: 73
Discharge: HOME | End: 2023-03-31
Payer: MEDICARE

## 2023-03-31 ENCOUNTER — OFFICE VISIT (OUTPATIENT)
Dept: ONCOLOGY | Age: 73
End: 2023-03-31

## 2023-03-31 VITALS
OXYGEN SATURATION: 95 % | BODY MASS INDEX: 30.22 KG/M2 | WEIGHT: 177 LBS | TEMPERATURE: 97.6 F | SYSTOLIC BLOOD PRESSURE: 120 MMHG | DIASTOLIC BLOOD PRESSURE: 58 MMHG | HEART RATE: 75 BPM | HEIGHT: 64 IN

## 2023-03-31 DIAGNOSIS — Z85.3 PERSONAL HISTORY OF BREAST CANCER: Primary | ICD-10-CM

## 2023-03-31 LAB
ALBUMIN SERPL-MCNC: 3.8 G/DL (ref 3.5–5.2)
ALP SERPL-CCNC: 114 U/L (ref 35–104)
ALT SERPL-CCNC: 10 U/L (ref 0–32)
ANION GAP SERPL CALCULATED.3IONS-SCNC: 8 MMOL/L (ref 7–16)
AST SERPL-CCNC: 14 U/L (ref 0–31)
BASOPHILS # BLD: 0.06 E9/L (ref 0–0.2)
BASOPHILS NFR BLD: 1.2 % (ref 0–2)
BILIRUB SERPL-MCNC: 0.2 MG/DL (ref 0–1.2)
BUN SERPL-MCNC: 15 MG/DL (ref 6–23)
CALCIUM SERPL-MCNC: 9.2 MG/DL (ref 8.6–10.2)
CHLORIDE SERPL-SCNC: 105 MMOL/L (ref 98–107)
CO2 SERPL-SCNC: 28 MMOL/L (ref 22–29)
CREAT SERPL-MCNC: 0.7 MG/DL (ref 0.5–1)
EOSINOPHIL # BLD: 0.18 E9/L (ref 0.05–0.5)
EOSINOPHIL NFR BLD: 3.6 % (ref 0–6)
ERYTHROCYTE [DISTWIDTH] IN BLOOD BY AUTOMATED COUNT: 15.4 FL (ref 11.5–15)
GLUCOSE SERPL-MCNC: 103 MG/DL (ref 74–99)
HCT VFR BLD AUTO: 33.2 % (ref 34–48)
HGB BLD-MCNC: 10.6 G/DL (ref 11.5–15.5)
IMM GRANULOCYTES # BLD: 0.01 E9/L
IMM GRANULOCYTES NFR BLD: 0.2 % (ref 0–5)
LYMPHOCYTES # BLD: 2.1 E9/L (ref 1.5–4)
LYMPHOCYTES NFR BLD: 41.6 % (ref 20–42)
MAGNESIUM SERPL-MCNC: 1.9 MG/DL (ref 1.6–2.6)
MCH RBC QN AUTO: 31.6 PG (ref 26–35)
MCHC RBC AUTO-ENTMCNC: 31.9 % (ref 32–34.5)
MCV RBC AUTO: 99.1 FL (ref 80–99.9)
MONOCYTES # BLD: 0.69 E9/L (ref 0.1–0.95)
MONOCYTES NFR BLD: 13.7 % (ref 2–12)
NEUTROPHILS # BLD: 2.01 E9/L (ref 1.8–7.3)
NEUTS SEG NFR BLD: 39.7 % (ref 43–80)
PLATELET # BLD AUTO: 170 E9/L (ref 130–450)
PMV BLD AUTO: 9.1 FL (ref 7–12)
POTASSIUM SERPL-SCNC: 4.5 MMOL/L (ref 3.5–5)
PROT SERPL-MCNC: 6.5 G/DL (ref 6.4–8.3)
RBC # BLD AUTO: 3.35 E12/L (ref 3.5–5.5)
SODIUM SERPL-SCNC: 141 MMOL/L (ref 132–146)
WBC # BLD: 5.1 E9/L (ref 4.5–11.5)

## 2023-03-31 PROCEDURE — 85025 COMPLETE CBC W/AUTO DIFF WBC: CPT

## 2023-03-31 PROCEDURE — 80053 COMPREHEN METABOLIC PANEL: CPT

## 2023-03-31 PROCEDURE — 2580000003 HC RX 258: Performed by: INTERNAL MEDICINE

## 2023-03-31 PROCEDURE — 6360000002 HC RX W HCPCS: Performed by: INTERNAL MEDICINE

## 2023-03-31 PROCEDURE — 83735 ASSAY OF MAGNESIUM: CPT

## 2023-03-31 PROCEDURE — 36591 DRAW BLOOD OFF VENOUS DEVICE: CPT

## 2023-03-31 RX ORDER — SODIUM CHLORIDE 0.9 % (FLUSH) 0.9 %
5-40 SYRINGE (ML) INJECTION PRN
Status: DISCONTINUED | OUTPATIENT
Start: 2023-03-31 | End: 2023-04-01 | Stop reason: HOSPADM

## 2023-03-31 RX ORDER — HEPARIN SODIUM (PORCINE) LOCK FLUSH IV SOLN 100 UNIT/ML 100 UNIT/ML
500 SOLUTION INTRAVENOUS PRN
Status: DISCONTINUED | OUTPATIENT
Start: 2023-03-31 | End: 2023-04-01 | Stop reason: HOSPADM

## 2023-03-31 RX ORDER — HEPARIN SODIUM (PORCINE) LOCK FLUSH IV SOLN 100 UNIT/ML 100 UNIT/ML
500 SOLUTION INTRAVENOUS PRN
OUTPATIENT
Start: 2023-03-31

## 2023-03-31 RX ORDER — SODIUM CHLORIDE 9 MG/ML
25 INJECTION, SOLUTION INTRAVENOUS PRN
OUTPATIENT
Start: 2023-03-31

## 2023-03-31 RX ORDER — SODIUM CHLORIDE 0.9 % (FLUSH) 0.9 %
5-40 SYRINGE (ML) INJECTION PRN
OUTPATIENT
Start: 2023-03-31

## 2023-03-31 RX ADMIN — SODIUM CHLORIDE, PRESERVATIVE FREE 10 ML: 5 INJECTION INTRAVENOUS at 08:26

## 2023-03-31 RX ADMIN — HEPARIN 500 UNITS: 100 SYRINGE at 08:26

## 2023-03-31 NOTE — PROGRESS NOTES
Patient provided with discharge instructions, received printed AVS.  All questions answered. Patient understands follow up plan of care.
allowing us to participate in the care of Mrs. Jud Acosta.     Evelyn Kate MD   HEMATOLOGY/MEDICAL 150 [facility]  [address] MED ONCOLOGY  [facility] [address]  Dept: 71 Kristi Kingsley: [phone]

## 2023-04-05 DIAGNOSIS — Z85.3 PERSONAL HISTORY OF MALIGNANT NEOPLASM OF BREAST: Primary | ICD-10-CM

## 2023-04-17 PROBLEM — Z79.899 OTHER LONG TERM (CURRENT) DRUG THERAPY: Status: ACTIVE | Noted: 2023-04-17

## 2023-04-18 ENCOUNTER — HOSPITAL ENCOUNTER (OUTPATIENT)
Dept: INFUSION THERAPY | Age: 73
Discharge: HOME | End: 2023-04-18
Payer: MEDICARE

## 2023-04-18 ENCOUNTER — HOSPITAL ENCOUNTER (OUTPATIENT)
Dept: GENERAL RADIOLOGY | Age: 73
Discharge: HOME | End: 2023-04-20
Payer: MEDICARE

## 2023-04-18 ENCOUNTER — OFFICE VISIT (OUTPATIENT)
Dept: ONCOLOGY | Age: 73
End: 2023-04-18
Payer: MEDICARE

## 2023-04-18 VITALS
HEART RATE: 75 BPM | RESPIRATION RATE: 16 BRPM | OXYGEN SATURATION: 95 % | SYSTOLIC BLOOD PRESSURE: 126 MMHG | DIASTOLIC BLOOD PRESSURE: 60 MMHG | TEMPERATURE: 97.5 F

## 2023-04-18 VITALS
DIASTOLIC BLOOD PRESSURE: 64 MMHG | HEART RATE: 78 BPM | HEIGHT: 64 IN | OXYGEN SATURATION: 96 % | BODY MASS INDEX: 30.22 KG/M2 | SYSTOLIC BLOOD PRESSURE: 146 MMHG | WEIGHT: 177 LBS | TEMPERATURE: 98.1 F

## 2023-04-18 VITALS — BODY MASS INDEX: 29.02 KG/M2 | WEIGHT: 170 LBS | HEIGHT: 64 IN

## 2023-04-18 DIAGNOSIS — Z79.899 OTHER LONG TERM (CURRENT) DRUG THERAPY: Primary | ICD-10-CM

## 2023-04-18 DIAGNOSIS — Z85.3 PERSONAL HISTORY OF BREAST CANCER: Primary | ICD-10-CM

## 2023-04-18 DIAGNOSIS — Z85.3 PERSONAL HISTORY OF BREAST CANCER: ICD-10-CM

## 2023-04-18 DIAGNOSIS — Z79.899 OTHER LONG TERM (CURRENT) DRUG THERAPY: ICD-10-CM

## 2023-04-18 DIAGNOSIS — Z85.3 PERSONAL HISTORY OF MALIGNANT NEOPLASM OF BREAST: ICD-10-CM

## 2023-04-18 LAB
ALBUMIN SERPL-MCNC: 4.3 G/DL (ref 3.5–5.2)
ALP SERPL-CCNC: 111 U/L (ref 35–104)
ALT SERPL-CCNC: 8 U/L (ref 0–32)
ANION GAP SERPL CALCULATED.3IONS-SCNC: 10 MMOL/L (ref 7–16)
AST SERPL-CCNC: 17 U/L (ref 0–31)
BASOPHILS # BLD: 0.06 E9/L (ref 0–0.2)
BASOPHILS NFR BLD: 1 % (ref 0–2)
BILIRUB SERPL-MCNC: 0.3 MG/DL (ref 0–1.2)
BUN SERPL-MCNC: 17 MG/DL (ref 6–23)
CALCIUM SERPL-MCNC: 9.6 MG/DL (ref 8.6–10.2)
CHLORIDE SERPL-SCNC: 104 MMOL/L (ref 98–107)
CO2 SERPL-SCNC: 26 MMOL/L (ref 22–29)
CREAT SERPL-MCNC: 0.8 MG/DL (ref 0.5–1)
EOSINOPHIL # BLD: 0.3 E9/L (ref 0.05–0.5)
EOSINOPHIL NFR BLD: 5 % (ref 0–6)
ERYTHROCYTE [DISTWIDTH] IN BLOOD BY AUTOMATED COUNT: 14 FL (ref 11.5–15)
GLUCOSE SERPL-MCNC: 109 MG/DL (ref 74–99)
HCT VFR BLD AUTO: 37.2 % (ref 34–48)
HGB BLD-MCNC: 12.1 G/DL (ref 11.5–15.5)
IMM GRANULOCYTES # BLD: 0.01 E9/L
IMM GRANULOCYTES NFR BLD: 0.2 % (ref 0–5)
LYMPHOCYTES # BLD: 1.84 E9/L (ref 1.5–4)
LYMPHOCYTES NFR BLD: 30.7 % (ref 20–42)
MCH RBC QN AUTO: 31.3 PG (ref 26–35)
MCHC RBC AUTO-ENTMCNC: 32.5 % (ref 32–34.5)
MCV RBC AUTO: 96.4 FL (ref 80–99.9)
MONOCYTES # BLD: 0.59 E9/L (ref 0.1–0.95)
MONOCYTES NFR BLD: 9.8 % (ref 2–12)
NEUTROPHILS # BLD: 3.19 E9/L (ref 1.8–7.3)
NEUTS SEG NFR BLD: 53.3 % (ref 43–80)
PLATELET # BLD AUTO: 175 E9/L (ref 130–450)
PMV BLD AUTO: 9.1 FL (ref 7–12)
POTASSIUM SERPL-SCNC: 4.5 MMOL/L (ref 3.5–5)
PROT SERPL-MCNC: 7.2 G/DL (ref 6.4–8.3)
RBC # BLD AUTO: 3.86 E12/L (ref 3.5–5.5)
SODIUM SERPL-SCNC: 140 MMOL/L (ref 132–146)
WBC # BLD: 6 E9/L (ref 4.5–11.5)

## 2023-04-18 PROCEDURE — 6360000002 HC RX W HCPCS: Performed by: INTERNAL MEDICINE

## 2023-04-18 PROCEDURE — 3017F COLORECTAL CA SCREEN DOC REV: CPT | Performed by: INTERNAL MEDICINE

## 2023-04-18 PROCEDURE — 80053 COMPREHEN METABOLIC PANEL: CPT

## 2023-04-18 PROCEDURE — 6370000000 HC RX 637 (ALT 250 FOR IP): Performed by: INTERNAL MEDICINE

## 2023-04-18 PROCEDURE — G8417 CALC BMI ABV UP PARAM F/U: HCPCS | Performed by: INTERNAL MEDICINE

## 2023-04-18 PROCEDURE — 1123F ACP DISCUSS/DSCN MKR DOCD: CPT | Performed by: INTERNAL MEDICINE

## 2023-04-18 PROCEDURE — 85025 COMPLETE CBC W/AUTO DIFF WBC: CPT

## 2023-04-18 PROCEDURE — G8400 PT W/DXA NO RESULTS DOC: HCPCS | Performed by: INTERNAL MEDICINE

## 2023-04-18 PROCEDURE — 96375 TX/PRO/DX INJ NEW DRUG ADDON: CPT

## 2023-04-18 PROCEDURE — G8427 DOCREV CUR MEDS BY ELIG CLIN: HCPCS | Performed by: INTERNAL MEDICINE

## 2023-04-18 PROCEDURE — 1090F PRES/ABSN URINE INCON ASSESS: CPT | Performed by: INTERNAL MEDICINE

## 2023-04-18 PROCEDURE — 99214 OFFICE O/P EST MOD 30 MIN: CPT | Performed by: INTERNAL MEDICINE

## 2023-04-18 PROCEDURE — 2580000003 HC RX 258: Performed by: INTERNAL MEDICINE

## 2023-04-18 PROCEDURE — 76642 ULTRASOUND BREAST LIMITED: CPT

## 2023-04-18 PROCEDURE — 96413 CHEMO IV INFUSION 1 HR: CPT

## 2023-04-18 PROCEDURE — 1036F TOBACCO NON-USER: CPT | Performed by: INTERNAL MEDICINE

## 2023-04-18 PROCEDURE — G0279 TOMOSYNTHESIS, MAMMO: HCPCS

## 2023-04-18 RX ORDER — FAMOTIDINE 10 MG/ML
20 INJECTION, SOLUTION INTRAVENOUS
Status: CANCELLED | OUTPATIENT
Start: 2023-04-18

## 2023-04-18 RX ORDER — DIPHENHYDRAMINE HYDROCHLORIDE 50 MG/ML
50 INJECTION INTRAMUSCULAR; INTRAVENOUS
Status: COMPLETED | OUTPATIENT
Start: 2023-04-18 | End: 2023-04-18

## 2023-04-18 RX ORDER — SODIUM CHLORIDE 0.9 % (FLUSH) 0.9 %
5-40 SYRINGE (ML) INJECTION PRN
Status: CANCELLED | OUTPATIENT
Start: 2023-04-18

## 2023-04-18 RX ORDER — ACETAMINOPHEN 325 MG/1
650 TABLET ORAL
Status: CANCELLED | OUTPATIENT
Start: 2023-04-18

## 2023-04-18 RX ORDER — HEPARIN SODIUM (PORCINE) LOCK FLUSH IV SOLN 100 UNIT/ML 100 UNIT/ML
500 SOLUTION INTRAVENOUS PRN
Status: CANCELLED | OUTPATIENT
Start: 2023-04-18

## 2023-04-18 RX ORDER — HEPARIN SODIUM (PORCINE) LOCK FLUSH IV SOLN 100 UNIT/ML 100 UNIT/ML
500 SOLUTION INTRAVENOUS PRN
Status: DISCONTINUED | OUTPATIENT
Start: 2023-04-18 | End: 2023-04-19 | Stop reason: HOSPADM

## 2023-04-18 RX ORDER — SODIUM CHLORIDE 0.9 % (FLUSH) 0.9 %
5-40 SYRINGE (ML) INJECTION PRN
OUTPATIENT
Start: 2023-04-18

## 2023-04-18 RX ORDER — MEPERIDINE HYDROCHLORIDE 50 MG/ML
12.5 INJECTION INTRAMUSCULAR; INTRAVENOUS; SUBCUTANEOUS PRN
Status: CANCELLED | OUTPATIENT
Start: 2023-04-18

## 2023-04-18 RX ORDER — SODIUM CHLORIDE 0.9 % (FLUSH) 0.9 %
5-40 SYRINGE (ML) INJECTION PRN
Status: DISCONTINUED | OUTPATIENT
Start: 2023-04-18 | End: 2023-04-19 | Stop reason: HOSPADM

## 2023-04-18 RX ORDER — SODIUM CHLORIDE 9 MG/ML
5-250 INJECTION, SOLUTION INTRAVENOUS PRN
Status: CANCELLED | OUTPATIENT
Start: 2023-04-18

## 2023-04-18 RX ORDER — SODIUM CHLORIDE 9 MG/ML
INJECTION, SOLUTION INTRAVENOUS CONTINUOUS
Status: CANCELLED | OUTPATIENT
Start: 2023-04-18

## 2023-04-18 RX ORDER — ALBUTEROL SULFATE 90 UG/1
4 AEROSOL, METERED RESPIRATORY (INHALATION) PRN
Status: CANCELLED | OUTPATIENT
Start: 2023-04-18

## 2023-04-18 RX ORDER — SODIUM CHLORIDE 9 MG/ML
25 INJECTION, SOLUTION INTRAVENOUS PRN
OUTPATIENT
Start: 2023-04-18

## 2023-04-18 RX ORDER — DIPHENHYDRAMINE HYDROCHLORIDE 50 MG/ML
50 INJECTION INTRAMUSCULAR; INTRAVENOUS
Status: CANCELLED | OUTPATIENT
Start: 2023-04-18

## 2023-04-18 RX ORDER — HEPARIN SODIUM (PORCINE) LOCK FLUSH IV SOLN 100 UNIT/ML 100 UNIT/ML
500 SOLUTION INTRAVENOUS PRN
OUTPATIENT
Start: 2023-04-18

## 2023-04-18 RX ORDER — ACETAMINOPHEN 325 MG/1
650 TABLET ORAL
Status: COMPLETED | OUTPATIENT
Start: 2023-04-18 | End: 2023-04-18

## 2023-04-18 RX ORDER — EPINEPHRINE 1 MG/ML
0.3 INJECTION, SOLUTION, CONCENTRATE INTRAVENOUS PRN
Status: CANCELLED | OUTPATIENT
Start: 2023-04-18

## 2023-04-18 RX ORDER — SODIUM CHLORIDE 9 MG/ML
5-250 INJECTION, SOLUTION INTRAVENOUS PRN
Status: DISCONTINUED | OUTPATIENT
Start: 2023-04-18 | End: 2023-04-19 | Stop reason: HOSPADM

## 2023-04-18 RX ORDER — ONDANSETRON 2 MG/ML
8 INJECTION INTRAMUSCULAR; INTRAVENOUS
Status: CANCELLED | OUTPATIENT
Start: 2023-04-18

## 2023-04-18 RX ADMIN — ACETAMINOPHEN 650 MG: 325 TABLET, FILM COATED ORAL at 14:06

## 2023-04-18 RX ADMIN — HEPARIN 500 UNITS: 100 SYRINGE at 15:06

## 2023-04-18 RX ADMIN — DIPHENHYDRAMINE HYDROCHLORIDE 50 MG: 50 INJECTION, SOLUTION INTRAMUSCULAR; INTRAVENOUS at 14:08

## 2023-04-18 RX ADMIN — SODIUM CHLORIDE 250 ML/HR: 9 INJECTION, SOLUTION INTRAVENOUS at 14:10

## 2023-04-18 RX ADMIN — SODIUM CHLORIDE 483 MG: 9 INJECTION, SOLUTION INTRAVENOUS at 14:25

## 2023-04-18 RX ADMIN — SODIUM CHLORIDE, PRESERVATIVE FREE 10 ML: 5 INJECTION INTRAVENOUS at 11:55

## 2023-04-18 RX ADMIN — SODIUM CHLORIDE, PRESERVATIVE FREE 10 ML: 5 INJECTION INTRAVENOUS at 15:06

## 2023-04-18 NOTE — PROGRESS NOTES
[address]  [facility] MED ONCOLOGY  [address] [id number]  Dept: Jani Kristi Kingsley: [phone]  Attending progress note      Reason for Visit:   Left breast cancer. Referring Physician: Dr. Manuel Tate. PCP:  Shey Franco MD    History of Present Illness:     Mrs. Sarah Parsons is a 68-year-old lady, with a past medical history significant for anxiety, hypothyroidism, and peripheral neuropathy, had presented with an abnormal screening mammogram from 5/24/2022, it had revealed new 3 6 mm mass in the upper outer left breast and 19 mm lymph node in the left axilla, on 5/27/2022 she underwent an ultrasound-guided left breast mass core biopsy, pathology had revealed invasive poorly differentiated carcinoma, grade 3, ER +80% W[place] +90% HER2/alex 2+ by IHC, positive by FISH, the patient had on 6/20/2022 CT scans of the chest, abdomen and pelvis done, revealing enlarged left axillary lymph node measuring just under 2 cm, no evidence of distant metastatic disease, she had a 2D echocardiogram done on 8/28/2022, revealing normal LVEF at 70%, patient was under the care of Dr. Manuel Tate, she received the first cycle of TCHP on 8/22/2022, the course was complicated by C. difficile infection which was diagnosed on 9/8/2022. She completed the course of oral vancomycin. The patient had just started to feel rare and like herself again, the diarrhea had resolved. The breast mass had decreased in size. The patient received Sivan Kub on 10/31/2022.   On 11/1/2022, the patient received the second cycle of chemotherapy, TCH, the patient was having brain fog after the chemotherapy, brain MRI was done on 11/25/2022, revealing diffuse wall thickening and FLAIR hyperintensity of the dura, with progression, differential diagnoses include but are not limited to intracranial hypertension, meningitis, neurosarcoidosis, lymphoma, hypertrophic pachymeningitis, meningeal metastasis and

## 2023-04-24 ENCOUNTER — HOSPITAL ENCOUNTER (OUTPATIENT)
Dept: RADIATION ONCOLOGY | Age: 73
Discharge: HOME | End: 2023-04-24
Payer: MEDICARE

## 2023-04-24 VITALS
SYSTOLIC BLOOD PRESSURE: 148 MMHG | WEIGHT: 179.6 LBS | TEMPERATURE: 97.1 F | BODY MASS INDEX: 30.83 KG/M2 | DIASTOLIC BLOOD PRESSURE: 89 MMHG | OXYGEN SATURATION: 98 % | HEART RATE: 73 BPM | RESPIRATION RATE: 18 BRPM

## 2023-04-24 DIAGNOSIS — C50.919 MALIGNANT NEOPLASM OF FEMALE BREAST, UNSPECIFIED ESTROGEN RECEPTOR STATUS, UNSPECIFIED LATERALITY, UNSPECIFIED SITE OF BREAST (HCC): Primary | ICD-10-CM

## 2023-04-24 PROCEDURE — 99205 OFFICE O/P NEW HI 60 MIN: CPT | Performed by: RADIOLOGY

## 2023-04-24 PROCEDURE — 99205 OFFICE O/P NEW HI 60 MIN: CPT

## 2023-04-26 NOTE — PROGRESS NOTES
Dakota Fill  1950 74 y.o. Referring Physician: Dr. Alan Spain    PCP: Jose Juan Correa MD     Vitals:    23 1513   BP: (!) 148/89   Pulse: 73   Resp: 18   Temp: 97.1 °F (36.2 °C)   SpO2: 98%        Wt Readings from Last 3 Encounters:   23 179 lb 9.6 oz (81.5 kg)   23 170 lb (77.1 kg)   23 177 lb (80.3 kg)        Body mass index is 30.83 kg/m². Chief Complaint: No chief complaint on file. Cancer Staging   No matching staging information was found for the patient. Prior Radiation Therapy? NO    Concurrent Chemo/radiation? NO    Prior Chemotherapy? NO, but currently on treatment    Prior Hormonal Therapy? NO    Head and Neck Cancer? No, patient does NOT have HN cancer. LMP:     Age at first Menses: 13    : 1    Para: 1        Current Outpatient Medications   Medication Sig Dispense Refill    amoxicillin-clavulanate (AUGMENTIN) 875-125 MG per tablet TAKE ONE TABLET BY MOUTH EVERY 12 HOURS      lidocaine viscous hcl-diphenhydrAMINE-nystatin SWISH AND SPIT 10 milliliters by mouth four times a day      amitriptyline (ELAVIL) 10 MG tablet Take 1 tablet by mouth nightly Takes 6 tablets      propranolol (INDERAL) 10 MG tablet Take 1 tablet by mouth 3 times daily      ARMOUR THYROID PO Take 60 mg by mouth daily       LISINOPRIL PO Take 20 mg by mouth daily 3pm      ALPRAZolam (XANAX) 0.5 MG tablet Take 1 tablet by mouth nightly as needed for Sleep.      traMADol (ULTRAM) 50 MG tablet Take 1 tablet by mouth nightly. Pregabalin (LYRICA PO) Take 50 mg by mouth nightly        No current facility-administered medications for this encounter.        Past Medical History:   Diagnosis Date    Anxiety     Breast cancer ([address]     Hx antineoplastic chemo     Hypertension     Mitral valve prolapse     Thyroid disease        Past Surgical History:   Procedure Laterality Date    CATARACT REMOVAL WITH IMPLANT Right 2020    HYSTERECTOMY (CERVIX STATUS
inadvertent computerized transcription errors may be present.

## 2023-05-09 ENCOUNTER — OFFICE VISIT (OUTPATIENT)
Dept: ONCOLOGY | Age: 73
End: 2023-05-09
Payer: MEDICARE

## 2023-05-09 ENCOUNTER — HOSPITAL ENCOUNTER (OUTPATIENT)
Dept: INFUSION THERAPY | Age: 73
Discharge: HOME | End: 2023-05-09
Payer: MEDICARE

## 2023-05-09 ENCOUNTER — TELEPHONE (OUTPATIENT)
Dept: CASE MANAGEMENT | Age: 73
End: 2023-05-09

## 2023-05-09 VITALS
OXYGEN SATURATION: 96 % | DIASTOLIC BLOOD PRESSURE: 60 MMHG | TEMPERATURE: 97.9 F | HEART RATE: 72 BPM | HEIGHT: 64 IN | WEIGHT: 182 LBS | SYSTOLIC BLOOD PRESSURE: 132 MMHG | BODY MASS INDEX: 31.07 KG/M2

## 2023-05-09 DIAGNOSIS — Z85.3 PERSONAL HISTORY OF BREAST CANCER: Primary | ICD-10-CM

## 2023-05-09 DIAGNOSIS — Z51.81 ENCOUNTER FOR THERAPEUTIC DRUG MONITORING: ICD-10-CM

## 2023-05-09 DIAGNOSIS — Z79.899 OTHER LONG TERM (CURRENT) DRUG THERAPY: ICD-10-CM

## 2023-05-09 DIAGNOSIS — Z79.899 NEED FOR PROPHYLACTIC CHEMOTHERAPY: ICD-10-CM

## 2023-05-09 LAB
ALBUMIN SERPL-MCNC: 3.8 G/DL (ref 3.5–5.2)
ALP SERPL-CCNC: 121 U/L (ref 35–104)
ALT SERPL-CCNC: 10 U/L (ref 0–32)
ANION GAP SERPL CALCULATED.3IONS-SCNC: 8 MMOL/L (ref 7–16)
AST SERPL-CCNC: 15 U/L (ref 0–31)
BASOPHILS # BLD: 0.04 E9/L (ref 0–0.2)
BASOPHILS NFR BLD: 0.7 % (ref 0–2)
BILIRUB SERPL-MCNC: 0.2 MG/DL (ref 0–1.2)
BUN SERPL-MCNC: 11 MG/DL (ref 6–23)
CALCIUM SERPL-MCNC: 9.3 MG/DL (ref 8.6–10.2)
CHLORIDE SERPL-SCNC: 103 MMOL/L (ref 98–107)
CO2 SERPL-SCNC: 28 MMOL/L (ref 22–29)
CREAT SERPL-MCNC: 0.8 MG/DL (ref 0.5–1)
EOSINOPHIL # BLD: 0.26 E9/L (ref 0.05–0.5)
EOSINOPHIL NFR BLD: 4.3 % (ref 0–6)
ERYTHROCYTE [DISTWIDTH] IN BLOOD BY AUTOMATED COUNT: 13 FL (ref 11.5–15)
GLUCOSE SERPL-MCNC: 100 MG/DL (ref 74–99)
HCT VFR BLD AUTO: 35.3 % (ref 34–48)
HGB BLD-MCNC: 11.2 G/DL (ref 11.5–15.5)
IMM GRANULOCYTES # BLD: 0.02 E9/L
IMM GRANULOCYTES NFR BLD: 0.3 % (ref 0–5)
LYMPHOCYTES # BLD: 2.09 E9/L (ref 1.5–4)
LYMPHOCYTES NFR BLD: 34.7 % (ref 20–42)
MAGNESIUM SERPL-MCNC: 2.1 MG/DL (ref 1.6–2.6)
MCH RBC QN AUTO: 30.9 PG (ref 26–35)
MCHC RBC AUTO-ENTMCNC: 31.7 % (ref 32–34.5)
MCV RBC AUTO: 97.5 FL (ref 80–99.9)
MONOCYTES # BLD: 0.6 E9/L (ref 0.1–0.95)
MONOCYTES NFR BLD: 10 % (ref 2–12)
NEUTROPHILS # BLD: 3.02 E9/L (ref 1.8–7.3)
NEUTS SEG NFR BLD: 50 % (ref 43–80)
PLATELET # BLD AUTO: 164 E9/L (ref 130–450)
PMV BLD AUTO: 9.3 FL (ref 7–12)
POTASSIUM SERPL-SCNC: 4.3 MMOL/L (ref 3.5–5)
PROT SERPL-MCNC: 6.5 G/DL (ref 6.4–8.3)
RBC # BLD AUTO: 3.62 E12/L (ref 3.5–5.5)
SODIUM SERPL-SCNC: 139 MMOL/L (ref 132–146)
WBC # BLD: 6 E9/L (ref 4.5–11.5)

## 2023-05-09 PROCEDURE — G8400 PT W/DXA NO RESULTS DOC: HCPCS | Performed by: INTERNAL MEDICINE

## 2023-05-09 PROCEDURE — 1090F PRES/ABSN URINE INCON ASSESS: CPT | Performed by: INTERNAL MEDICINE

## 2023-05-09 PROCEDURE — 3017F COLORECTAL CA SCREEN DOC REV: CPT | Performed by: INTERNAL MEDICINE

## 2023-05-09 PROCEDURE — G8417 CALC BMI ABV UP PARAM F/U: HCPCS | Performed by: INTERNAL MEDICINE

## 2023-05-09 PROCEDURE — 83735 ASSAY OF MAGNESIUM: CPT

## 2023-05-09 PROCEDURE — 1123F ACP DISCUSS/DSCN MKR DOCD: CPT | Performed by: INTERNAL MEDICINE

## 2023-05-09 PROCEDURE — 6360000002 HC RX W HCPCS: Performed by: INTERNAL MEDICINE

## 2023-05-09 PROCEDURE — 1036F TOBACCO NON-USER: CPT | Performed by: INTERNAL MEDICINE

## 2023-05-09 PROCEDURE — G8427 DOCREV CUR MEDS BY ELIG CLIN: HCPCS | Performed by: INTERNAL MEDICINE

## 2023-05-09 PROCEDURE — 2580000003 HC RX 258: Performed by: INTERNAL MEDICINE

## 2023-05-09 PROCEDURE — 80053 COMPREHEN METABOLIC PANEL: CPT

## 2023-05-09 PROCEDURE — 99214 OFFICE O/P EST MOD 30 MIN: CPT | Performed by: INTERNAL MEDICINE

## 2023-05-09 PROCEDURE — 85025 COMPLETE CBC W/AUTO DIFF WBC: CPT

## 2023-05-09 PROCEDURE — 36591 DRAW BLOOD OFF VENOUS DEVICE: CPT

## 2023-05-09 RX ORDER — SODIUM CHLORIDE 0.9 % (FLUSH) 0.9 %
5-40 SYRINGE (ML) INJECTION PRN
OUTPATIENT
Start: 2023-05-09

## 2023-05-09 RX ORDER — HEPARIN SODIUM (PORCINE) LOCK FLUSH IV SOLN 100 UNIT/ML 100 UNIT/ML
500 SOLUTION INTRAVENOUS PRN
OUTPATIENT
Start: 2023-05-09

## 2023-05-09 RX ORDER — HEPARIN SODIUM (PORCINE) LOCK FLUSH IV SOLN 100 UNIT/ML 100 UNIT/ML
500 SOLUTION INTRAVENOUS PRN
Status: DISCONTINUED | OUTPATIENT
Start: 2023-05-09 | End: 2023-05-10 | Stop reason: HOSPADM

## 2023-05-09 RX ORDER — SODIUM CHLORIDE 0.9 % (FLUSH) 0.9 %
5-40 SYRINGE (ML) INJECTION PRN
Status: DISCONTINUED | OUTPATIENT
Start: 2023-05-09 | End: 2023-05-10 | Stop reason: HOSPADM

## 2023-05-09 RX ORDER — SODIUM CHLORIDE 9 MG/ML
25 INJECTION, SOLUTION INTRAVENOUS PRN
OUTPATIENT
Start: 2023-05-09

## 2023-05-09 RX ADMIN — HEPARIN 500 UNITS: 100 SYRINGE at 08:34

## 2023-05-09 RX ADMIN — SODIUM CHLORIDE, PRESERVATIVE FREE 10 ML: 5 INJECTION INTRAVENOUS at 08:32

## 2023-05-09 RX ADMIN — SODIUM CHLORIDE, PRESERVATIVE FREE 10 ML: 5 INJECTION INTRAVENOUS at 08:34

## 2023-05-09 NOTE — PROGRESS NOTES
Patient provided with discharge instructions, received printed AVS.  All questions answered. Patient understands follow up plan of care.   Patient aware to arrive @ 08:30 am. for labs same day first.
subsequent cycles if she does well with this treatment. the patient was having brain fog after the chemotherapy, MRI was ordered, was done at [facility], the brain fog had significantly improved, no fever. No other neurological symptoms. Brain MRI was done on 11/25/2022, revealing diffuse wall thickening and FLAIR hyperintensity of the dura, with progression, differential diagnoses include but are not limited to intracranial hypertension, meningitis, neurosarcoidosis, lymphoma, hypertrophic pachymeningitis, meningeal metastasis and postoperative reaction, referral was placed to neuro and neurosurgery for evaluation. The patient was seen by neuro, was recommended brain MRI with and without contrast.  We discussed that she has any neurological symptoms to go to the ED. the patient did not want any treatment until after the holidays, she is agreeable to resume [address], no Perjeta or steroids. The patient received [address], cycle #4 on 2/21/2023. The patient had side effects, including fatigue, constipation, and epistaxis. Today 3/21/2023, the patient is doing well clinically, labs reviewed, blood are counts are adequate for treatment, she is scheduled to receive TCH, cycle #5, the patient wants to stop the treatment, I discussed with her the importance of the treatment, and how aggressive HER2 positive disease is, we discussed that if she would like to start the chemotherapy, would at least consider HP. The patient does not want Perjeta due to diarrhea/ C diff that she had with the first treatment, I asked her to think about it, the patient did not want to receive her treatment on 3/21/2023, she had decided against continuing Perjeta, she was to receive Herceptin alone, she has requested to delay the treatment until after Easter. She received single agent Herceptin on 4/18/2023. The patient had epistaxis, she feels it was from the Herceptin.   I told the patient that the epistaxis is not secondary to Herceptin,

## 2023-05-17 ENCOUNTER — TELEPHONE (OUTPATIENT)
Dept: ONCOLOGY | Age: 73
End: 2023-05-17

## 2023-05-17 NOTE — TELEPHONE ENCOUNTER
Patient ordered Echo Complete by Josette Flower for Dr. Morena Willson. Patient has Medicare, no prior authorization required. Called [facility] and left message to schedule Echo before 05/30/23, if possible, and not on 05/22/23 as patient is having surgery this date. Will wait to hear from [facility], once have date and time of Echo, will call patient with information. Called patient at both home & mobile phone numbers listed. Received voice mail on both. Left detailed messages on both explaining Echo scheduled @ [facility], with arrival time of 08:30 am. for 09:00 am. Echo, Friday, 05/26/23. Encouraged patient to call our office if there are any questions.

## 2023-05-22 ENCOUNTER — HOSPITAL ENCOUNTER (OUTPATIENT)
Age: 73
Discharge: HOME | End: 2023-05-24

## 2023-05-22 PROCEDURE — 88305 TISSUE EXAM BY PATHOLOGIST: CPT

## 2023-05-22 PROCEDURE — 88333 PATH CONSLTJ SURG CYTO XM 1: CPT

## 2023-05-22 PROCEDURE — 88307 TISSUE EXAM BY PATHOLOGIST: CPT

## 2023-05-25 ENCOUNTER — TELEPHONE (OUTPATIENT)
Dept: NON INVASIVE DIAGNOSTICS | Age: 73
End: 2023-05-25

## 2023-05-26 DIAGNOSIS — Z85.3 PERSONAL HISTORY OF BREAST CANCER: Primary | ICD-10-CM

## 2023-05-30 ENCOUNTER — HOSPITAL ENCOUNTER (OUTPATIENT)
Dept: INFUSION THERAPY | Age: 73
Discharge: HOME | End: 2023-05-30

## 2023-05-30 DIAGNOSIS — Z85.3 PERSONAL HISTORY OF BREAST CANCER: Primary | ICD-10-CM

## 2023-05-30 RX ORDER — HEPARIN SODIUM (PORCINE) LOCK FLUSH IV SOLN 100 UNIT/ML 100 UNIT/ML
500 SOLUTION INTRAVENOUS PRN
Status: CANCELLED | OUTPATIENT
Start: 2023-05-30

## 2023-05-30 RX ORDER — HEPARIN SODIUM (PORCINE) LOCK FLUSH IV SOLN 100 UNIT/ML 100 UNIT/ML
500 SOLUTION INTRAVENOUS PRN
Status: DISCONTINUED | OUTPATIENT
Start: 2023-05-30 | End: 2023-05-31 | Stop reason: HOSPADM

## 2023-05-30 RX ORDER — SODIUM CHLORIDE 9 MG/ML
25 INJECTION, SOLUTION INTRAVENOUS PRN
OUTPATIENT
Start: 2023-05-30

## 2023-05-30 RX ORDER — SODIUM CHLORIDE 0.9 % (FLUSH) 0.9 %
5-40 SYRINGE (ML) INJECTION PRN
Status: DISCONTINUED | OUTPATIENT
Start: 2023-05-30 | End: 2023-05-31 | Stop reason: HOSPADM

## 2023-05-30 RX ORDER — SODIUM CHLORIDE 0.9 % (FLUSH) 0.9 %
5-40 SYRINGE (ML) INJECTION PRN
Status: CANCELLED | OUTPATIENT
Start: 2023-05-30

## 2023-06-06 ENCOUNTER — TELEPHONE (OUTPATIENT)
Dept: CASE MANAGEMENT | Age: 73
End: 2023-06-06

## 2023-06-06 ENCOUNTER — TELEPHONE (OUTPATIENT)
Dept: NON INVASIVE DIAGNOSTICS | Age: 73
End: 2023-06-06

## 2023-06-06 NOTE — TELEPHONE ENCOUNTER
Late Entry:Called patient re: follow up after surgery. Patient states that she is doing ok. She had a drain that was removed last Tuesday. Patient states that they took more lymph nodes than expected and she is still recovering slowly but surely. Patient states that she is awaiting a call back from  [employer] office as her arm feels \"weird\" like a sunburn and sore. Patient confirmed her appointment for Friday 6-9 with Dr Jose Luis Rand. She states that she will probably not continue therapy but she will have a full discussion with doctor on her visit. I reminded patient that if she does continue than she will need to R/S her echocardiogram. Patient states that she was \"wrapped up like a mummy\" that is why she had cancel her appointment. Patient appreciative of call.

## 2023-06-07 ENCOUNTER — TELEPHONE (OUTPATIENT)
Dept: NON INVASIVE DIAGNOSTICS | Age: 73
End: 2023-06-07

## 2023-06-07 NOTE — TELEPHONE ENCOUNTER
CALLED PATIENT AGAIN TO RESCHEDULE ECHO THAT SHE CANCELLED ON 5/26. LEFT ANOTHER VOICEMAIL FOR HER TO CALL ME BACK.

## 2023-06-09 ENCOUNTER — OFFICE VISIT (OUTPATIENT)
Dept: ONCOLOGY | Age: 73
End: 2023-06-09

## 2023-06-09 ENCOUNTER — HOSPITAL ENCOUNTER (OUTPATIENT)
Dept: INFUSION THERAPY | Age: 73
Discharge: HOME | End: 2023-06-09
Payer: MEDICARE

## 2023-06-09 VITALS
TEMPERATURE: 98 F | HEART RATE: 74 BPM | BODY MASS INDEX: 31.07 KG/M2 | DIASTOLIC BLOOD PRESSURE: 58 MMHG | HEIGHT: 64 IN | WEIGHT: 182 LBS | OXYGEN SATURATION: 96 % | SYSTOLIC BLOOD PRESSURE: 126 MMHG

## 2023-06-09 DIAGNOSIS — Z85.3 PERSONAL HISTORY OF BREAST CANCER: Primary | ICD-10-CM

## 2023-06-09 LAB
ALBUMIN SERPL-MCNC: 3.9 G/DL (ref 3.5–5.2)
ALP SERPL-CCNC: 117 U/L (ref 35–104)
ALT SERPL-CCNC: 12 U/L (ref 0–32)
ANION GAP SERPL CALCULATED.3IONS-SCNC: 12 MMOL/L (ref 7–16)
AST SERPL-CCNC: 16 U/L (ref 0–31)
BASOPHILS # BLD: 0.03 E9/L (ref 0–0.2)
BASOPHILS NFR BLD: 0.5 % (ref 0–2)
BILIRUB SERPL-MCNC: <0.2 MG/DL (ref 0–1.2)
BUN SERPL-MCNC: 13 MG/DL (ref 6–23)
CALCIUM SERPL-MCNC: 9.5 MG/DL (ref 8.6–10.2)
CHLORIDE SERPL-SCNC: 104 MMOL/L (ref 98–107)
CO2 SERPL-SCNC: 26 MMOL/L (ref 22–29)
CREAT SERPL-MCNC: 0.9 MG/DL (ref 0.5–1)
EOSINOPHIL # BLD: 0.19 E9/L (ref 0.05–0.5)
EOSINOPHIL NFR BLD: 3.3 % (ref 0–6)
ERYTHROCYTE [DISTWIDTH] IN BLOOD BY AUTOMATED COUNT: 12.8 FL (ref 11.5–15)
GLUCOSE SERPL-MCNC: 104 MG/DL (ref 74–99)
HCT VFR BLD AUTO: 35.6 % (ref 34–48)
HGB BLD-MCNC: 11.7 G/DL (ref 11.5–15.5)
IMM GRANULOCYTES # BLD: 0.01 E9/L
IMM GRANULOCYTES NFR BLD: 0.2 % (ref 0–5)
LYMPHOCYTES # BLD: 2.11 E9/L (ref 1.5–4)
LYMPHOCYTES NFR BLD: 36.6 % (ref 20–42)
MAGNESIUM SERPL-MCNC: 2 MG/DL (ref 1.6–2.6)
MCH RBC QN AUTO: 31.1 PG (ref 26–35)
MCHC RBC AUTO-ENTMCNC: 32.9 % (ref 32–34.5)
MCV RBC AUTO: 94.7 FL (ref 80–99.9)
MONOCYTES # BLD: 0.55 E9/L (ref 0.1–0.95)
MONOCYTES NFR BLD: 9.5 % (ref 2–12)
NEUTROPHILS # BLD: 2.88 E9/L (ref 1.8–7.3)
NEUTS SEG NFR BLD: 49.9 % (ref 43–80)
PLATELET # BLD AUTO: 193 E9/L (ref 130–450)
PMV BLD AUTO: 9.4 FL (ref 7–12)
POTASSIUM SERPL-SCNC: 4.3 MMOL/L (ref 3.5–5)
PROT SERPL-MCNC: 6.8 G/DL (ref 6.4–8.3)
RBC # BLD AUTO: 3.76 E12/L (ref 3.5–5.5)
SODIUM SERPL-SCNC: 142 MMOL/L (ref 132–146)
WBC # BLD: 5.8 E9/L (ref 4.5–11.5)

## 2023-06-09 PROCEDURE — 83735 ASSAY OF MAGNESIUM: CPT

## 2023-06-09 PROCEDURE — 85025 COMPLETE CBC W/AUTO DIFF WBC: CPT

## 2023-06-09 PROCEDURE — 2580000003 HC RX 258: Performed by: INTERNAL MEDICINE

## 2023-06-09 PROCEDURE — 80053 COMPREHEN METABOLIC PANEL: CPT

## 2023-06-09 PROCEDURE — 36591 DRAW BLOOD OFF VENOUS DEVICE: CPT

## 2023-06-09 RX ORDER — HEPARIN SODIUM 100 [USP'U]/ML
500 INJECTION, SOLUTION INTRAVENOUS PRN
OUTPATIENT
Start: 2023-06-09

## 2023-06-09 RX ORDER — SODIUM CHLORIDE 0.9 % (FLUSH) 0.9 %
5-40 SYRINGE (ML) INJECTION PRN
Status: DISCONTINUED | OUTPATIENT
Start: 2023-06-09 | End: 2023-06-10 | Stop reason: HOSPADM

## 2023-06-09 RX ORDER — HEPARIN SODIUM 100 [USP'U]/ML
500 INJECTION, SOLUTION INTRAVENOUS PRN
Status: DISCONTINUED | OUTPATIENT
Start: 2023-06-09 | End: 2023-06-10 | Stop reason: HOSPADM

## 2023-06-09 RX ORDER — SODIUM CHLORIDE 0.9 % (FLUSH) 0.9 %
5-40 SYRINGE (ML) INJECTION PRN
OUTPATIENT
Start: 2023-06-09

## 2023-06-09 RX ORDER — SODIUM CHLORIDE 9 MG/ML
25 INJECTION, SOLUTION INTRAVENOUS PRN
OUTPATIENT
Start: 2023-06-09

## 2023-06-09 RX ADMIN — HEPARIN SODIUM 500 UNITS: 100 INJECTION, SOLUTION INTRAVENOUS at 09:28

## 2023-06-09 RX ADMIN — SODIUM CHLORIDE, PRESERVATIVE FREE 10 ML: 5 INJECTION INTRAVENOUS at 09:28

## 2023-06-09 RX ADMIN — SODIUM CHLORIDE, PRESERVATIVE FREE 10 ML: 5 INJECTION INTRAVENOUS at 09:25

## 2023-06-09 NOTE — PROGRESS NOTES
Patient provided with discharge instructions, received printed AVS.  All questions answered. Patient understands follow up plan of care.
palpable mass, no palpable left axillary lymphadenopathy. ABDOMEN: Soft. Non-tender, non-distended. Positive bowel sounds. EXTREMITIES: Without clubbing, cyanosis, or edema. NEUROLOGIC: No focal deficits. ECOG PS 1    Impression/Plan:     Mrs. Milo Booker is a 77-year-old lady, with a past medical history significant for anxiety, hypothyroidism, and peripheral neuropathy, had presented with an abnormal screening mammogram from 5/24/2022, it had revealed new 36 mm mass in the upper outer left breast and 19 mm lymph node in the left axilla, on 5/27/2022 she underwent an ultrasound-guided left breast mass core biopsy, pathology had revealed invasive poorly differentiated carcinoma, grade 3, ER +80% NC +90% HER2/alex 2+ by IHC, positive by FISH, the patient had on 6/20/2022 CT scans of the chest, abdomen and pelvis done, revealing enlarged left axillary lymph node measuring just under 2 cm, no evidence of distant metastatic disease, she had a 2D echocardiogram done on 8/28/2022, revealing normal LVEF at 70%, patient was under the care of Dr. Carolyn Torres, she received the first cycle of TCHP on 8/22/2022, the course was complicated by C. difficile infection which was diagnosed on 9/8/2022. She completed the course of oral vancomycin. The patient had just started to feel rare and like herself again, the diarrhea had resolved. The breast mass had decreased in size. Patient has T2 N1 M0 breast cancer, hormone receptor and HER2/alex positive, prognosis was discussed with the patient, amended to continue the neoadjuvant therapy, with the second cycle we will continue with LILIAN ZAZUETA, if she does well we can reintroduce Perjeta with the subsequent cycles. Recommended evaluation by ID prior to restarting the treatment, the patient was seen by Jose Juan Giordano NP, was recommended Zinplava infusion, which she had completed.     On 11/1/2020, the patient received [facility] with dose adjustment for better tolerance, with a plan to add Perjeta for

## 2023-06-20 PROCEDURE — 77470 SPECIAL RADIATION TREATMENT: CPT | Performed by: RADIOLOGY

## 2023-06-23 ENCOUNTER — TELEPHONE (OUTPATIENT)
Dept: CASE MANAGEMENT | Age: 73
End: 2023-06-23

## 2023-06-23 NOTE — TELEPHONE ENCOUNTER
Patient called in with questions about new treatment. Patient not sure what she wants to start Kadcyla due to side effects. Instructed patient to keep her appointment with Dr Saud Barbour on Tuesday 6-27-23 to discuss treatment planning and options. Patient agreeable.

## 2023-06-23 NOTE — TELEPHONE ENCOUNTER
Called Dr Jonathan Moran office to check status for clearance to start planning process for Radiation treatments. Spoke with Eve Guaman. She was going to reach out to Dr Michael Casey to determine if there were any limitations to patient moving forward with Radiation. Received a return call from Eve Guaman stating that per Dr Michael Casey patient to not postpone any treatments and to follow her Oncologists recommendations for timing of treatments. Will update Radiation Oncology office to schedule patient for CT simulation.

## 2023-06-27 ENCOUNTER — TELEPHONE (OUTPATIENT)
Dept: CASE MANAGEMENT | Age: 73
End: 2023-06-27

## 2023-06-27 ENCOUNTER — HOSPITAL ENCOUNTER (OUTPATIENT)
Dept: INFUSION THERAPY | Age: 73
Discharge: HOME | End: 2023-06-27
Payer: MEDICARE

## 2023-06-27 ENCOUNTER — OFFICE VISIT (OUTPATIENT)
Dept: ONCOLOGY | Age: 73
End: 2023-06-27
Payer: MEDICARE

## 2023-06-27 VITALS
SYSTOLIC BLOOD PRESSURE: 130 MMHG | DIASTOLIC BLOOD PRESSURE: 62 MMHG | WEIGHT: 182 LBS | HEART RATE: 67 BPM | OXYGEN SATURATION: 95 % | BODY MASS INDEX: 31.07 KG/M2 | HEIGHT: 64 IN | TEMPERATURE: 97.3 F

## 2023-06-27 DIAGNOSIS — Z85.3 PERSONAL HISTORY OF BREAST CANCER: Primary | ICD-10-CM

## 2023-06-27 LAB
ALBUMIN SERPL-MCNC: 3.8 G/DL (ref 3.5–5.2)
ALP SERPL-CCNC: 126 U/L (ref 35–104)
ALT SERPL-CCNC: 9 U/L (ref 0–32)
ANION GAP SERPL CALCULATED.3IONS-SCNC: 10 MMOL/L (ref 7–16)
AST SERPL-CCNC: 15 U/L (ref 0–31)
BASOPHILS # BLD: 0.05 E9/L (ref 0–0.2)
BASOPHILS NFR BLD: 0.8 % (ref 0–2)
BILIRUB SERPL-MCNC: <0.2 MG/DL (ref 0–1.2)
BUN SERPL-MCNC: 12 MG/DL (ref 6–23)
CALCIUM SERPL-MCNC: 9.2 MG/DL (ref 8.6–10.2)
CHLORIDE SERPL-SCNC: 103 MMOL/L (ref 98–107)
CO2 SERPL-SCNC: 26 MMOL/L (ref 22–29)
CREAT SERPL-MCNC: 0.9 MG/DL (ref 0.5–1)
EOSINOPHIL # BLD: 0.21 E9/L (ref 0.05–0.5)
EOSINOPHIL NFR BLD: 3.5 % (ref 0–6)
ERYTHROCYTE [DISTWIDTH] IN BLOOD BY AUTOMATED COUNT: 12.8 FL (ref 11.5–15)
GLUCOSE SERPL-MCNC: 104 MG/DL (ref 74–99)
HCT VFR BLD AUTO: 35.4 % (ref 34–48)
HGB BLD-MCNC: 11.6 G/DL (ref 11.5–15.5)
IMM GRANULOCYTES # BLD: 0.02 E9/L
IMM GRANULOCYTES NFR BLD: 0.3 % (ref 0–5)
LYMPHOCYTES # BLD: 2.26 E9/L (ref 1.5–4)
LYMPHOCYTES NFR BLD: 37.5 % (ref 20–42)
MAGNESIUM SERPL-MCNC: 1.9 MG/DL (ref 1.6–2.6)
MCH RBC QN AUTO: 30.4 PG (ref 26–35)
MCHC RBC AUTO-ENTMCNC: 32.8 % (ref 32–34.5)
MCV RBC AUTO: 92.7 FL (ref 80–99.9)
MONOCYTES # BLD: 0.57 E9/L (ref 0.1–0.95)
MONOCYTES NFR BLD: 9.5 % (ref 2–12)
NEUTROPHILS # BLD: 2.91 E9/L (ref 1.8–7.3)
NEUTS SEG NFR BLD: 48.4 % (ref 43–80)
PLATELET # BLD AUTO: 159 E9/L (ref 130–450)
PMV BLD AUTO: 8.8 FL (ref 7–12)
POTASSIUM SERPL-SCNC: 4.4 MMOL/L (ref 3.5–5)
PROT SERPL-MCNC: 6.4 G/DL (ref 6.4–8.3)
RBC # BLD AUTO: 3.82 E12/L (ref 3.5–5.5)
SODIUM SERPL-SCNC: 139 MMOL/L (ref 132–146)
WBC # BLD: 6 E9/L (ref 4.5–11.5)

## 2023-06-27 PROCEDURE — 6360000002 HC RX W HCPCS: Performed by: INTERNAL MEDICINE

## 2023-06-27 PROCEDURE — 36591 DRAW BLOOD OFF VENOUS DEVICE: CPT

## 2023-06-27 PROCEDURE — G8427 DOCREV CUR MEDS BY ELIG CLIN: HCPCS | Performed by: INTERNAL MEDICINE

## 2023-06-27 PROCEDURE — 99214 OFFICE O/P EST MOD 30 MIN: CPT | Performed by: INTERNAL MEDICINE

## 2023-06-27 PROCEDURE — 83735 ASSAY OF MAGNESIUM: CPT

## 2023-06-27 PROCEDURE — 1036F TOBACCO NON-USER: CPT | Performed by: INTERNAL MEDICINE

## 2023-06-27 PROCEDURE — 85025 COMPLETE CBC W/AUTO DIFF WBC: CPT

## 2023-06-27 PROCEDURE — G8400 PT W/DXA NO RESULTS DOC: HCPCS | Performed by: INTERNAL MEDICINE

## 2023-06-27 PROCEDURE — 3017F COLORECTAL CA SCREEN DOC REV: CPT | Performed by: INTERNAL MEDICINE

## 2023-06-27 PROCEDURE — 1090F PRES/ABSN URINE INCON ASSESS: CPT | Performed by: INTERNAL MEDICINE

## 2023-06-27 PROCEDURE — 1123F ACP DISCUSS/DSCN MKR DOCD: CPT | Performed by: INTERNAL MEDICINE

## 2023-06-27 PROCEDURE — G8417 CALC BMI ABV UP PARAM F/U: HCPCS | Performed by: INTERNAL MEDICINE

## 2023-06-27 PROCEDURE — 2580000003 HC RX 258: Performed by: INTERNAL MEDICINE

## 2023-06-27 PROCEDURE — 99214 OFFICE O/P EST MOD 30 MIN: CPT

## 2023-06-27 PROCEDURE — 80053 COMPREHEN METABOLIC PANEL: CPT

## 2023-06-27 RX ORDER — HEPARIN SODIUM 100 [USP'U]/ML
500 INJECTION, SOLUTION INTRAVENOUS PRN
Status: DISCONTINUED | OUTPATIENT
Start: 2023-06-27 | End: 2023-06-28 | Stop reason: HOSPADM

## 2023-06-27 RX ORDER — SODIUM CHLORIDE 0.9 % (FLUSH) 0.9 %
5-40 SYRINGE (ML) INJECTION PRN
OUTPATIENT
Start: 2023-06-27

## 2023-06-27 RX ORDER — SODIUM CHLORIDE 9 MG/ML
25 INJECTION, SOLUTION INTRAVENOUS PRN
OUTPATIENT
Start: 2023-06-27

## 2023-06-27 RX ORDER — HEPARIN SODIUM 100 [USP'U]/ML
500 INJECTION, SOLUTION INTRAVENOUS PRN
OUTPATIENT
Start: 2023-06-27

## 2023-06-27 RX ORDER — SODIUM CHLORIDE 0.9 % (FLUSH) 0.9 %
5-40 SYRINGE (ML) INJECTION PRN
Status: DISCONTINUED | OUTPATIENT
Start: 2023-06-27 | End: 2023-06-28 | Stop reason: HOSPADM

## 2023-06-27 RX ADMIN — SODIUM CHLORIDE, PRESERVATIVE FREE 10 ML: 5 INJECTION INTRAVENOUS at 10:14

## 2023-06-27 RX ADMIN — HEPARIN 500 UNITS: 100 SYRINGE at 10:17

## 2023-06-27 RX ADMIN — SODIUM CHLORIDE, PRESERVATIVE FREE 10 ML: 5 INJECTION INTRAVENOUS at 10:17

## 2023-07-19 ENCOUNTER — HOSPITAL ENCOUNTER (OUTPATIENT)
Dept: RADIATION ONCOLOGY | Age: 73
Discharge: HOME | End: 2023-07-19
Payer: MEDICARE

## 2023-07-19 PROCEDURE — 77332 RADIATION TREATMENT AID(S): CPT | Performed by: RADIOLOGY

## 2023-07-19 PROCEDURE — 77290 THER RAD SIMULAJ FIELD CPLX: CPT | Performed by: RADIOLOGY

## 2023-08-02 ENCOUNTER — HOSPITAL ENCOUNTER (OUTPATIENT)
Dept: RADIATION ONCOLOGY | Age: 73
Discharge: HOME | End: 2023-08-02
Payer: MEDICARE

## 2023-08-02 PROCEDURE — 77300 RADIATION THERAPY DOSE PLAN: CPT | Performed by: RADIOLOGY

## 2023-08-02 PROCEDURE — 77338 DESIGN MLC DEVICE FOR IMRT: CPT | Performed by: RADIOLOGY

## 2023-08-02 PROCEDURE — 77293 RESPIRATOR MOTION MGMT SIMUL: CPT | Performed by: RADIOLOGY

## 2023-08-02 PROCEDURE — 77301 RADIOTHERAPY DOSE PLAN IMRT: CPT | Performed by: RADIOLOGY

## 2023-08-03 ENCOUNTER — TELEPHONE (OUTPATIENT)
Dept: CASE MANAGEMENT | Age: 73
End: 2023-08-03

## 2023-08-03 NOTE — TELEPHONE ENCOUNTER
Patient called in with questions about port flush. Instructed patient that port should be flushed every 6-8 weeks. Patient will call front office when she is ready. Denies any problems with it at this time. Last flush was end of June 2023 all questions answered to the best of my ability. Patient appreciative of information.

## 2023-08-15 ENCOUNTER — APPOINTMENT (OUTPATIENT)
Dept: RADIATION ONCOLOGY | Age: 73
End: 2023-08-15
Payer: MEDICARE

## 2023-08-16 ENCOUNTER — HOSPITAL ENCOUNTER (OUTPATIENT)
Dept: RADIATION ONCOLOGY | Age: 73
Discharge: HOME | End: 2023-08-16
Payer: MEDICARE

## 2023-08-16 DIAGNOSIS — C50.919 MALIGNANT NEOPLASM OF FEMALE BREAST, UNSPECIFIED ESTROGEN RECEPTOR STATUS, UNSPECIFIED LATERALITY, UNSPECIFIED SITE OF BREAST (HCC): Primary | ICD-10-CM

## 2023-08-16 PROCEDURE — NBSRV NON-BILLABLE SERVICE: Performed by: RADIOLOGY

## 2023-08-16 PROCEDURE — 77385 HC NTSTY MODUL RAD TX DLVR SMPL: CPT | Performed by: RADIOLOGY

## 2023-08-16 NOTE — PROGRESS NOTES
Radiation Oncology          Diagnosis:      A. [place] lymph node #1: 1 lymph node positive for metastatic        carcinoma      B. Left breast, lumpectomy: Invasive carcinoma of no special type        (ductal)             Jus score 3+2+3 = 8 (grade 3)             Margins of excision free of carcinoma. See cancer case summary      C. Left axillary lymph nodes: 6 lymph nodes negative for metastatic        carcinoma      D. Left breast, superior margin: Benign breast tissue. Negative for        carcinoma. E. Left breast, lateral margin: Benign breast tissue. Negative for        carcinoma. F. Left breast, inferior margin: Benign breast tissue. Negative for        carcinoma. G. Left breast, medial margin: Benign breast tissue. Negative for        carcinoma. H. Left breast, deep margin: Benign breast tissue. Negative for        carcinoma. Comment:   CANCER CASE SUMMARY   Procedure: Excision   Specimen Laterality: Left   Histologic Type of Invasive Carcinoma: Invasive carcinoma of no special   type (ductal)   [place] Histologic Score: 3+2+3 = 8   Overall grade: Grade 3   Tumor Size: Size of Largest Invasive Carcinoma: 3.2 x 1.5 x 1.3 cm   Ductal Carcinoma In Situ (DCIS): Not identified   Tumor Extent: Not applicable (skin, nipple, and skeletal muscle are   absent OR are uninvolved)   Lymphovascular invasion: Present, focal   Treatment effect in the breast: Probable or definite response to   presurgical therapy in the invasive carcinoma   Treatment effect in the lymph nodes: No definite response to presurgical   therapy in metastatic carcinoma   Margins:       Invasive carcinoma margins:  All margins negative for invasive   carcinoma             Distance from nearest margin: At least 1.5 cm       DCIS margins: Not applicable (no DCIS in specimen)   Regional lymph Nodes: Tumor present in regional lymph nodes       Total number of lymph nodes examined: 7       Number

## 2023-08-16 NOTE — PROGRESS NOTES
DEPARTMENT OF RADIATION ONCOLOGY ON TREATMENT VISIT         8/16/2023      NAME:  Kristofer Cassidy    YOB: 1950    Diagnosis: breast cancer    SUBJECTIVE:   Kristofer Cassidy has now received fractionated external beam radiation therapy - ongoing. Past medical, surgical, social and family histories reviewed and updated as indicated. Pain: controlled    ALLERGIES:  Adhesive tape, Medrol [methylprednisolone], Codeine, Darvocet a500 [propoxyphene n-acetaminophen], and Sulfa antibiotics         Current Outpatient Medications   Medication Sig Dispense Refill    lidocaine viscous hcl-diphenhydrAMINE-nystatin SWISH AND SPIT 10 milliliters by mouth four times a day      amitriptyline (ELAVIL) 10 MG tablet Take 1 tablet by mouth nightly Takes 6 tablets      propranolol (INDERAL) 10 MG tablet Take 1 tablet by mouth 3 times daily      ARMOUR THYROID PO Take 60 mg by mouth daily       LISINOPRIL PO Take 20 mg by mouth daily 3pm      ALPRAZolam (XANAX) 0.5 MG tablet Take 1 tablet by mouth nightly as needed for Sleep.      traMADol (ULTRAM) 50 MG tablet Take 1 tablet by mouth nightly. Pregabalin (LYRICA PO) Take 50 mg by mouth nightly        No current facility-administered medications for this encounter. OBJECTIVE:  Alert and fully ambulatory. Pleasant and conversant. Physical Examination: General appearance - alert, well appearing, and in no distress. Wt Readings from Last 3 Encounters:   06/27/23 182 lb (82.6 kg)   06/09/23 182 lb (82.6 kg)   05/09/23 182 lb (82.6 kg)         ASSESSMENT/PLAN:     Patient is tolerating treatments well with expected toxicities. RBA were reviewed prior to first fraction and PRN. Current and planned dose reviewed. Goals of treatment and potential side effects were reviewed with the patient PRN. Treatment imaging has been personally reviewed for accuracy and precision. Questions answered to apparent satisfaction.     Treatments will

## 2023-08-17 ENCOUNTER — HOSPITAL ENCOUNTER (OUTPATIENT)
Dept: RADIATION ONCOLOGY | Age: 73
Discharge: HOME | End: 2023-08-17
Payer: MEDICARE

## 2023-08-17 PROCEDURE — 77385 HC NTSTY MODUL RAD TX DLVR SMPL: CPT | Performed by: RADIOLOGY

## 2023-08-18 ENCOUNTER — HOSPITAL ENCOUNTER (OUTPATIENT)
Dept: RADIATION ONCOLOGY | Age: 73
Discharge: HOME | End: 2023-08-18
Payer: MEDICARE

## 2023-08-18 PROCEDURE — 77385 HC NTSTY MODUL RAD TX DLVR SMPL: CPT | Performed by: RADIOLOGY

## 2023-08-21 ENCOUNTER — HOSPITAL ENCOUNTER (OUTPATIENT)
Dept: RADIATION ONCOLOGY | Age: 73
Discharge: HOME | End: 2023-08-21
Payer: MEDICARE

## 2023-08-21 PROCEDURE — 77427 RADIATION TX MANAGEMENT X5: CPT | Performed by: RADIOLOGY

## 2023-08-21 PROCEDURE — 77385 HC NTSTY MODUL RAD TX DLVR SMPL: CPT | Performed by: RADIOLOGY

## 2023-08-21 NOTE — PATIENT INSTRUCTIONS
Continue daily fractionated radiation therapy as scheduled. Please see weekly OTV note and intial consultation letter in [facility] for clinical details. Oc Ramos. Estefania Henriquez MD MS [address] List:  [phone]   FAX: [phone] [address]:  [phone]   FAX:    [phone]  [facility]:  [phone]   FAX:  [phone]  Email: [email or web address]

## 2023-08-21 NOTE — PROGRESS NOTES
DEPARTMENT OF RADIATION ONCOLOGY ON TREATMENT VISIT         8/21/2023      NAME:  Janice Berkowitz    YOB: 1950    Diagnosis: breast cancer    SUBJECTIVE:   Janice Berkowitz has now received fractionated external beam radiation therapy - ongoing. Past medical, surgical, social and family histories reviewed and updated as indicated. Pain: controlled    ALLERGIES:  Adhesive tape, Medrol [methylprednisolone], Codeine, Darvocet a500 [propoxyphene n-acetaminophen], and Sulfa antibiotics         Current Outpatient Medications   Medication Sig Dispense Refill    lidocaine viscous hcl-diphenhydrAMINE-nystatin SWISH AND SPIT 10 milliliters by mouth four times a day      amitriptyline (ELAVIL) 10 MG tablet Take 1 tablet by mouth nightly Takes 6 tablets      propranolol (INDERAL) 10 MG tablet Take 1 tablet by mouth 3 times daily      ARMOUR THYROID PO Take 60 mg by mouth daily       LISINOPRIL PO Take 20 mg by mouth daily 3pm      ALPRAZolam (XANAX) 0.5 MG tablet Take 1 tablet by mouth nightly as needed for Sleep.      traMADol (ULTRAM) 50 MG tablet Take 1 tablet by mouth nightly. Pregabalin (LYRICA PO) Take 50 mg by mouth nightly        No current facility-administered medications for this encounter. OBJECTIVE:  Alert and fully ambulatory. Pleasant and conversant. Physical Examination: General appearance - alert, well appearing, and in no distress. Wt Readings from Last 3 Encounters:   06/27/23 182 lb (82.6 kg)   06/09/23 182 lb (82.6 kg)   05/09/23 182 lb (82.6 kg)         ASSESSMENT/PLAN:     Patient is tolerating treatments well with expected toxicities. RBA were reviewed prior to first fraction and PRN. Current and planned dose reviewed. Goals of treatment and potential side effects were reviewed with the patient PRN. Treatment imaging has been personally reviewed for accuracy and precision. Questions answered to apparent satisfaction.     Treatments will

## 2023-08-22 ENCOUNTER — HOSPITAL ENCOUNTER (OUTPATIENT)
Dept: RADIATION ONCOLOGY | Age: 73
Discharge: HOME | End: 2023-08-22
Payer: MEDICARE

## 2023-08-22 PROCEDURE — 77336 RADIATION PHYSICS CONSULT: CPT | Performed by: RADIOLOGY

## 2023-08-22 PROCEDURE — 77385 HC NTSTY MODUL RAD TX DLVR SMPL: CPT | Performed by: RADIOLOGY

## 2023-08-22 PROCEDURE — 77014 CHG CT GUIDANCE RADIATION THERAPY FLDS PLACEMENT: CPT | Performed by: RADIOLOGY

## 2023-08-23 ENCOUNTER — HOSPITAL ENCOUNTER (OUTPATIENT)
Dept: RADIATION ONCOLOGY | Age: 73
Discharge: HOME | End: 2023-08-23
Payer: MEDICARE

## 2023-08-23 VITALS
OXYGEN SATURATION: 98 % | TEMPERATURE: 97.1 F | SYSTOLIC BLOOD PRESSURE: 109 MMHG | RESPIRATION RATE: 18 BRPM | HEART RATE: 71 BPM | DIASTOLIC BLOOD PRESSURE: 61 MMHG | WEIGHT: 190.5 LBS | BODY MASS INDEX: 32.7 KG/M2

## 2023-08-23 DIAGNOSIS — C50.919 MALIGNANT NEOPLASM OF FEMALE BREAST, UNSPECIFIED ESTROGEN RECEPTOR STATUS, UNSPECIFIED LATERALITY, UNSPECIFIED SITE OF BREAST (HCC): Primary | ICD-10-CM

## 2023-08-23 PROCEDURE — NBSRV NON-BILLABLE SERVICE: Performed by: RADIOLOGY

## 2023-08-23 PROCEDURE — 77385 HC NTSTY MODUL RAD TX DLVR SMPL: CPT | Performed by: RADIOLOGY

## 2023-08-23 NOTE — PROGRESS NOTES
DEPARTMENT OF RADIATION ONCOLOGY ON TREATMENT VISIT         8/23/2023      NAME:  India Rogers    YOB: 1950    Diagnosis: breast cancer    SUBJECTIVE:   India Rogers has now received fractionated external beam radiation therapy - ongoing. Past medical, surgical, social and family histories reviewed and updated as indicated. Pain: controlled    ALLERGIES:  Adhesive tape, Medrol [methylprednisolone], Codeine, Darvocet a500 [propoxyphene n-acetaminophen], and Sulfa antibiotics         Current Outpatient Medications   Medication Sig Dispense Refill    lidocaine viscous hcl-diphenhydrAMINE-nystatin SWISH AND SPIT 10 milliliters by mouth four times a day      amitriptyline (ELAVIL) 10 MG tablet Take 1 tablet by mouth nightly Takes 6 tablets      propranolol (INDERAL) 10 MG tablet Take 1 tablet by mouth 3 times daily      ARMOUR THYROID PO Take 60 mg by mouth daily       LISINOPRIL PO Take 20 mg by mouth daily 3pm      ALPRAZolam (XANAX) 0.5 MG tablet Take 1 tablet by mouth nightly as needed for Sleep.      traMADol (ULTRAM) 50 MG tablet Take 1 tablet by mouth nightly. Pregabalin (LYRICA PO) Take 50 mg by mouth nightly        No current facility-administered medications for this encounter. OBJECTIVE:  Alert and fully ambulatory. Pleasant and conversant. Physical Examination: General appearance - alert, well appearing, and in no distress. Wt Readings from Last 3 Encounters:   08/23/23 190 lb 8 oz (86.4 kg)   06/27/23 182 lb (82.6 kg)   06/09/23 182 lb (82.6 kg)         ASSESSMENT/PLAN:     Patient is tolerating treatments well with expected toxicities. RBA were reviewed prior to first fraction and PRN. Current and planned dose reviewed. Goals of treatment and potential side effects were reviewed with the patient PRN. Treatment imaging has been personally reviewed for accuracy and precision. Questions answered to apparent satisfaction.     Treatments

## 2023-08-23 NOTE — PATIENT INSTRUCTIONS
Continue daily fractionated radiation therapy as scheduled. Please see weekly OTV note and intial consultation letter in [facility] for clinical details. Aviva Runner. Rosie Don MD MS Theone Putt:  [phone]   FAX: [phone] [address]:  [phone]   FAX:    [phone] [address]:  [phone]   FAX:  [phone]  Email: [email or web address]

## 2023-08-23 NOTE — PROGRESS NOTES
Lashonda Alvarado  8/23/2023  Wt Readings from Last 3 Encounters:   06/27/23 182 lb (82.6 kg)   06/09/23 182 lb (82.6 kg)   05/09/23 182 lb (82.6 kg)     There is no height or weight on file to calculate BMI. Treatment Area:CTV left breast RLN's    Patient was seen today for weekly visit. Comfort Alteration  KPS:90%  Fatigue: Moderate    Nutritional Alteration  Anorexia: No   Nausea: No   Vomiting: No     Skin Alteration   Sensation:good and teaching of lotion care    Radiation Dermatitis:  na    Mucous Membrane Alteration  Drainage: No  Lymphedema: No    Emotional  Coping: effective    Sexuality Alteration  na    Injury, potential bleeding or infection: na        Lab Results   Component Value Date    WBC 6.0 06/27/2023     06/27/2023         There were no vitals taken for this visit. BP within normal range?  yes           Assessment/Plan:6/30fx  1200/6000cGy and tolerating    Minnie Vargas RN

## 2023-08-24 ENCOUNTER — HOSPITAL ENCOUNTER (OUTPATIENT)
Dept: RADIATION ONCOLOGY | Age: 73
Discharge: HOME | End: 2023-08-24
Payer: MEDICARE

## 2023-08-24 PROCEDURE — 77385 HC NTSTY MODUL RAD TX DLVR SMPL: CPT | Performed by: RADIOLOGY

## 2023-08-25 ENCOUNTER — HOSPITAL ENCOUNTER (OUTPATIENT)
Dept: RADIATION ONCOLOGY | Age: 73
Discharge: HOME | End: 2023-08-25
Payer: MEDICARE

## 2023-08-25 PROCEDURE — 77385 HC NTSTY MODUL RAD TX DLVR SMPL: CPT | Performed by: RADIOLOGY

## 2023-08-28 ENCOUNTER — HOSPITAL ENCOUNTER (OUTPATIENT)
Dept: RADIATION ONCOLOGY | Age: 73
Discharge: HOME | End: 2023-08-28
Payer: MEDICARE

## 2023-08-28 PROCEDURE — 77334 RADIATION TREATMENT AID(S): CPT | Performed by: RADIOLOGY

## 2023-08-28 PROCEDURE — 77385 HC NTSTY MODUL RAD TX DLVR SMPL: CPT | Performed by: RADIOLOGY

## 2023-08-28 PROCEDURE — 77307 TELETHX ISODOSE PLAN CPLX: CPT | Performed by: RADIOLOGY

## 2023-08-28 PROCEDURE — 77427 RADIATION TX MANAGEMENT X5: CPT | Performed by: RADIOLOGY

## 2023-08-29 ENCOUNTER — HOSPITAL ENCOUNTER (OUTPATIENT)
Dept: RADIATION ONCOLOGY | Age: 73
Discharge: HOME | End: 2023-08-29
Payer: MEDICARE

## 2023-08-29 PROCEDURE — 77336 RADIATION PHYSICS CONSULT: CPT | Performed by: RADIOLOGY

## 2023-08-29 PROCEDURE — 77014 CHG CT GUIDANCE RADIATION THERAPY FLDS PLACEMENT: CPT | Performed by: RADIOLOGY

## 2023-08-29 PROCEDURE — 77385 HC NTSTY MODUL RAD TX DLVR SMPL: CPT | Performed by: RADIOLOGY

## 2023-08-30 ENCOUNTER — HOSPITAL ENCOUNTER (OUTPATIENT)
Dept: RADIATION ONCOLOGY | Age: 73
Discharge: HOME | End: 2023-08-30
Payer: MEDICARE

## 2023-08-30 VITALS
RESPIRATION RATE: 18 BRPM | BODY MASS INDEX: 32.1 KG/M2 | DIASTOLIC BLOOD PRESSURE: 75 MMHG | WEIGHT: 187 LBS | SYSTOLIC BLOOD PRESSURE: 137 MMHG | HEART RATE: 69 BPM | OXYGEN SATURATION: 92 % | TEMPERATURE: 97.5 F

## 2023-08-30 DIAGNOSIS — C50.919 MALIGNANT NEOPLASM OF FEMALE BREAST, UNSPECIFIED ESTROGEN RECEPTOR STATUS, UNSPECIFIED LATERALITY, UNSPECIFIED SITE OF BREAST (HCC): Primary | ICD-10-CM

## 2023-08-30 PROCEDURE — 77385 HC NTSTY MODUL RAD TX DLVR SMPL: CPT | Performed by: RADIOLOGY

## 2023-08-30 PROCEDURE — NBSRV NON-BILLABLE SERVICE: Performed by: RADIOLOGY

## 2023-08-30 RX ORDER — SUCRALFATE ORAL 1 G/10ML
1 SUSPENSION ORAL 4 TIMES DAILY
Qty: 1200 ML | Refills: 3 | Status: SHIPPED | OUTPATIENT
Start: 2023-08-30

## 2023-08-30 NOTE — ADDENDUM NOTE
Encounter addended by: China Capone MD on: 8/30/2023 2:15 PM   Actions taken: Order list changed, Diagnosis association updated

## 2023-08-30 NOTE — PATIENT INSTRUCTIONS
Continue daily fractionated radiation therapy as scheduled. Please see weekly OTV note and intial consultation letter in [facility] for clinical details. Humberto Awad. Lalo Boss MD MS [address] Velasquez:  [phone]   FAX: [phone] [address]:  [phone]   FAX:    [phone] [address]:  [phone]   FAX:  [phone]  Email: [email or web address]

## 2023-08-30 NOTE — PROGRESS NOTES
DEPARTMENT OF RADIATION ONCOLOGY ON TREATMENT VISIT         8/30/2023      NAME:  Ria Wise    YOB: 1950    Diagnosis: breast cancer    SUBJECTIVE:   Ria Wise has now received fractionated external beam radiation therapy - ongoing. Past medical, surgical, social and family histories reviewed and updated as indicated. Pain: controlled    ALLERGIES:  Adhesive tape, Medrol [methylprednisolone], Codeine, Darvocet a500 [propoxyphene n-acetaminophen], and Sulfa antibiotics         Current Outpatient Medications   Medication Sig Dispense Refill    lidocaine viscous hcl-diphenhydrAMINE-nystatin SWISH AND SPIT 10 milliliters by mouth four times a day      amitriptyline (ELAVIL) 10 MG tablet Take 1 tablet by mouth nightly Takes 6 tablets      propranolol (INDERAL) 10 MG tablet Take 1 tablet by mouth 3 times daily      ARMOUR THYROID PO Take 60 mg by mouth daily       LISINOPRIL PO Take 20 mg by mouth daily 3pm      ALPRAZolam (XANAX) 0.5 MG tablet Take 1 tablet by mouth nightly as needed for Sleep.      traMADol (ULTRAM) 50 MG tablet Take 1 tablet by mouth nightly. Pregabalin (LYRICA PO) Take 50 mg by mouth nightly        No current facility-administered medications for this encounter. OBJECTIVE:  Alert and fully ambulatory. Pleasant and conversant. Physical Examination: General appearance - alert, well appearing, and in no distress. Wt Readings from Last 3 Encounters:   08/30/23 187 lb (84.8 kg)   08/23/23 190 lb 8 oz (86.4 kg)   06/27/23 182 lb (82.6 kg)         ASSESSMENT/PLAN:     Patient is tolerating treatments well with expected toxicities. RBA were reviewed prior to first fraction and PRN. Current and planned dose reviewed. Goals of treatment and potential side effects were reviewed with the patient PRN. Treatment imaging has been personally reviewed for accuracy and precision. Questions answered to apparent satisfaction.     Treatments

## 2023-08-31 ENCOUNTER — HOSPITAL ENCOUNTER (OUTPATIENT)
Dept: RADIATION ONCOLOGY | Age: 73
Discharge: HOME | End: 2023-08-31
Payer: MEDICARE

## 2023-08-31 PROCEDURE — 77385 HC NTSTY MODUL RAD TX DLVR SMPL: CPT | Performed by: RADIOLOGY

## 2023-09-01 ENCOUNTER — HOSPITAL ENCOUNTER (OUTPATIENT)
Dept: RADIATION ONCOLOGY | Age: 73
Discharge: HOME | End: 2023-09-01
Payer: MEDICARE

## 2023-09-01 PROCEDURE — 77385 HC NTSTY MODUL RAD TX DLVR SMPL: CPT | Performed by: RADIOLOGY

## 2023-09-05 ENCOUNTER — HOSPITAL ENCOUNTER (OUTPATIENT)
Dept: RADIATION ONCOLOGY | Age: 73
Discharge: HOME | End: 2023-09-05
Payer: MEDICARE

## 2023-09-05 PROCEDURE — 77385 HC NTSTY MODUL RAD TX DLVR SMPL: CPT | Performed by: RADIOLOGY

## 2023-09-06 ENCOUNTER — HOSPITAL ENCOUNTER (OUTPATIENT)
Dept: RADIATION ONCOLOGY | Age: 73
Discharge: HOME | End: 2023-09-06
Payer: MEDICARE

## 2023-09-06 VITALS
BODY MASS INDEX: 32.25 KG/M2 | OXYGEN SATURATION: 96 % | TEMPERATURE: 97.6 F | DIASTOLIC BLOOD PRESSURE: 70 MMHG | HEART RATE: 74 BPM | RESPIRATION RATE: 18 BRPM | WEIGHT: 187.9 LBS | SYSTOLIC BLOOD PRESSURE: 129 MMHG

## 2023-09-06 DIAGNOSIS — C50.919 MALIGNANT NEOPLASM OF FEMALE BREAST, UNSPECIFIED ESTROGEN RECEPTOR STATUS, UNSPECIFIED LATERALITY, UNSPECIFIED SITE OF BREAST (HCC): Primary | ICD-10-CM

## 2023-09-06 PROCEDURE — 77427 RADIATION TX MANAGEMENT X5: CPT | Performed by: RADIOLOGY

## 2023-09-06 PROCEDURE — 77385 HC NTSTY MODUL RAD TX DLVR SMPL: CPT | Performed by: RADIOLOGY

## 2023-09-06 PROCEDURE — 77336 RADIATION PHYSICS CONSULT: CPT | Performed by: RADIOLOGY

## 2023-09-06 PROCEDURE — 77014 CHG CT GUIDANCE RADIATION THERAPY FLDS PLACEMENT: CPT | Performed by: RADIOLOGY

## 2023-09-06 NOTE — PATIENT INSTRUCTIONS
Continue daily fractionated radiation therapy as scheduled. Please see weekly OTV note and intial consultation letter in [facility] for clinical details. Kylah Baumann. Hugo Lundberg MD MS Renee Karl:  [phone]   FAX: [phone] [address]:  [phone]   FAX:    [phone] [address]:  [phone]   FAX:  [phone]  Email: [email or web address]

## 2023-09-06 NOTE — PROGRESS NOTES
DEPARTMENT OF RADIATION ONCOLOGY ON TREATMENT VISIT         9/6/2023      NAME:  Ramana Steward    YOB: 1950    Diagnosis: breast cancer    SUBJECTIVE:   Ramana Steward has now received fractionated external beam radiation therapy - ongoing. Past medical, surgical, social and family histories reviewed and updated as indicated. Pain: controlled    ALLERGIES:  Adhesive tape, Medrol [methylprednisolone], Codeine, Darvocet a500 [propoxyphene n-acetaminophen], and Sulfa antibiotics         Current Outpatient Medications   Medication Sig Dispense Refill    sucralfate (CARAFATE) 1 GM/10ML suspension Take 10 mLs by mouth 4 times daily 1200 mL 3    lidocaine viscous hcl-diphenhydrAMINE-nystatin SWISH AND SPIT 10 milliliters by mouth four times a day      amitriptyline (ELAVIL) 10 MG tablet Take 1 tablet by mouth nightly Takes 6 tablets      propranolol (INDERAL) 10 MG tablet Take 1 tablet by mouth 3 times daily      ARMOUR THYROID PO Take 60 mg by mouth daily       LISINOPRIL PO Take 20 mg by mouth daily 3pm      ALPRAZolam (XANAX) 0.5 MG tablet Take 1 tablet by mouth nightly as needed for Sleep.      traMADol (ULTRAM) 50 MG tablet Take 1 tablet by mouth nightly. Pregabalin (LYRICA PO) Take 50 mg by mouth nightly        No current facility-administered medications for this encounter. OBJECTIVE:  Alert and fully ambulatory. Pleasant and conversant. Physical Examination: General appearance - alert, well appearing, and in no distress. Wt Readings from Last 3 Encounters:   09/06/23 187 lb 14.4 oz (85.2 kg)   08/30/23 187 lb (84.8 kg)   08/23/23 190 lb 8 oz (86.4 kg)         ASSESSMENT/PLAN:     Patient is tolerating treatments well with expected toxicities. RBA were reviewed prior to first fraction and PRN. Current and planned dose reviewed. Goals of treatment and potential side effects were reviewed with the patient PRN.  Treatment imaging has been personally

## 2023-09-07 ENCOUNTER — HOSPITAL ENCOUNTER (OUTPATIENT)
Dept: RADIATION ONCOLOGY | Age: 73
Discharge: HOME | End: 2023-09-07
Payer: MEDICARE

## 2023-09-07 PROCEDURE — 77385 HC NTSTY MODUL RAD TX DLVR SMPL: CPT | Performed by: RADIOLOGY

## 2023-09-08 ENCOUNTER — HOSPITAL ENCOUNTER (OUTPATIENT)
Dept: RADIATION ONCOLOGY | Age: 73
Discharge: HOME | End: 2023-09-08
Payer: MEDICARE

## 2023-09-08 PROCEDURE — 77385 HC NTSTY MODUL RAD TX DLVR SMPL: CPT | Performed by: RADIOLOGY

## 2023-09-11 ENCOUNTER — HOSPITAL ENCOUNTER (OUTPATIENT)
Dept: RADIATION ONCOLOGY | Age: 73
Discharge: HOME | End: 2023-09-11
Payer: MEDICARE

## 2023-09-11 PROCEDURE — 77385 HC NTSTY MODUL RAD TX DLVR SMPL: CPT | Performed by: RADIOLOGY

## 2023-09-12 ENCOUNTER — HOSPITAL ENCOUNTER (OUTPATIENT)
Dept: RADIATION ONCOLOGY | Age: 73
Discharge: HOME | End: 2023-09-12
Payer: MEDICARE

## 2023-09-12 PROCEDURE — 77385 HC NTSTY MODUL RAD TX DLVR SMPL: CPT | Performed by: RADIOLOGY

## 2023-09-13 ENCOUNTER — HOSPITAL ENCOUNTER (OUTPATIENT)
Dept: RADIATION ONCOLOGY | Age: 73
Discharge: HOME | End: 2023-09-13
Payer: MEDICARE

## 2023-09-13 VITALS
OXYGEN SATURATION: 97 % | SYSTOLIC BLOOD PRESSURE: 138 MMHG | DIASTOLIC BLOOD PRESSURE: 74 MMHG | RESPIRATION RATE: 18 BRPM | TEMPERATURE: 97.4 F | WEIGHT: 187.9 LBS | BODY MASS INDEX: 32.25 KG/M2 | HEART RATE: 71 BPM

## 2023-09-13 DIAGNOSIS — C50.919 MALIGNANT NEOPLASM OF FEMALE BREAST, UNSPECIFIED ESTROGEN RECEPTOR STATUS, UNSPECIFIED LATERALITY, UNSPECIFIED SITE OF BREAST (HCC): Primary | ICD-10-CM

## 2023-09-13 PROCEDURE — 77336 RADIATION PHYSICS CONSULT: CPT | Performed by: RADIOLOGY

## 2023-09-13 PROCEDURE — 77385 HC NTSTY MODUL RAD TX DLVR SMPL: CPT | Performed by: RADIOLOGY

## 2023-09-13 PROCEDURE — 77427 RADIATION TX MANAGEMENT X5: CPT | Performed by: RADIOLOGY

## 2023-09-13 PROCEDURE — 77014 CHG CT GUIDANCE RADIATION THERAPY FLDS PLACEMENT: CPT | Performed by: RADIOLOGY

## 2023-09-13 PROCEDURE — NBSRV NON-BILLABLE SERVICE: Performed by: RADIOLOGY

## 2023-09-14 ENCOUNTER — HOSPITAL ENCOUNTER (OUTPATIENT)
Dept: RADIATION ONCOLOGY | Age: 73
Discharge: HOME | End: 2023-09-14
Payer: MEDICARE

## 2023-09-14 PROCEDURE — 77385 HC NTSTY MODUL RAD TX DLVR SMPL: CPT | Performed by: RADIOLOGY

## 2023-09-15 ENCOUNTER — HOSPITAL ENCOUNTER (OUTPATIENT)
Dept: RADIATION ONCOLOGY | Age: 73
Discharge: HOME | End: 2023-09-15
Payer: MEDICARE

## 2023-09-15 PROCEDURE — 77385 HC NTSTY MODUL RAD TX DLVR SMPL: CPT | Performed by: RADIOLOGY

## 2023-09-18 ENCOUNTER — HOSPITAL ENCOUNTER (OUTPATIENT)
Dept: RADIATION ONCOLOGY | Age: 73
Discharge: HOME | End: 2023-09-18
Payer: MEDICARE

## 2023-09-18 PROCEDURE — 77385 HC NTSTY MODUL RAD TX DLVR SMPL: CPT | Performed by: RADIOLOGY

## 2023-09-19 ENCOUNTER — HOSPITAL ENCOUNTER (OUTPATIENT)
Dept: RADIATION ONCOLOGY | Age: 73
Discharge: HOME | End: 2023-09-19
Payer: MEDICARE

## 2023-09-19 PROCEDURE — 77385 HC NTSTY MODUL RAD TX DLVR SMPL: CPT | Performed by: RADIOLOGY

## 2023-09-20 ENCOUNTER — HOSPITAL ENCOUNTER (OUTPATIENT)
Dept: RADIATION ONCOLOGY | Age: 73
Discharge: HOME | End: 2023-09-20
Payer: MEDICARE

## 2023-09-20 VITALS
WEIGHT: 186.8 LBS | RESPIRATION RATE: 18 BRPM | TEMPERATURE: 97.4 F | OXYGEN SATURATION: 90 % | BODY MASS INDEX: 32.06 KG/M2 | HEART RATE: 71 BPM

## 2023-09-20 DIAGNOSIS — C50.919 MALIGNANT NEOPLASM OF FEMALE BREAST, UNSPECIFIED ESTROGEN RECEPTOR STATUS, UNSPECIFIED LATERALITY, UNSPECIFIED SITE OF BREAST (HCC): Primary | ICD-10-CM

## 2023-09-20 PROCEDURE — 77427 RADIATION TX MANAGEMENT X5: CPT | Performed by: RADIOLOGY

## 2023-09-20 PROCEDURE — 77336 RADIATION PHYSICS CONSULT: CPT | Performed by: RADIOLOGY

## 2023-09-20 PROCEDURE — 77417 THER RADIOLOGY PORT IMAGE(S): CPT | Performed by: RADIOLOGY

## 2023-09-20 PROCEDURE — 77385 HC NTSTY MODUL RAD TX DLVR SMPL: CPT | Performed by: RADIOLOGY

## 2023-09-20 ASSESSMENT — PAIN SCALES - GENERAL: PAINLEVEL_OUTOF10: 10

## 2023-09-20 ASSESSMENT — PAIN DESCRIPTION - LOCATION: LOCATION: THROAT

## 2023-09-20 ASSESSMENT — PAIN DESCRIPTION - PAIN TYPE: TYPE: ACUTE PAIN

## 2023-09-20 NOTE — PROGRESS NOTES
DEPARTMENT OF RADIATION ONCOLOGY ON TREATMENT VISIT         9/20/2023      NAME:  Cade Perea    YOB: 1950    Diagnosis: breast cancer    SUBJECTIVE:   Cade Perea has now received fractionated external beam radiation therapy\ - ongoing. Past medical, surgical, social and family histories reviewed and updated as indicated. Pain: controlled    ALLERGIES:  Adhesive tape, Medrol [methylprednisolone], Codeine, Darvocet a500 [propoxyphene n-acetaminophen], and Sulfa antibiotics         Current Outpatient Medications   Medication Sig Dispense Refill    sucralfate (CARAFATE) 1 GM/10ML suspension Take 10 mLs by mouth 4 times daily 1200 mL 3    lidocaine viscous hcl-diphenhydrAMINE-nystatin SWISH AND SPIT 10 milliliters by mouth four times a day      amitriptyline (ELAVIL) 10 MG tablet Take 1 tablet by mouth nightly Takes 6 tablets      propranolol (INDERAL) 10 MG tablet Take 1 tablet by mouth 3 times daily      ARMOUR THYROID PO Take 60 mg by mouth daily       LISINOPRIL PO Take 20 mg by mouth daily 3pm      ALPRAZolam (XANAX) 0.5 MG tablet Take 1 tablet by mouth nightly as needed for Sleep.      traMADol (ULTRAM) 50 MG tablet Take 1 tablet by mouth nightly. Pregabalin (LYRICA PO) Take 50 mg by mouth nightly        No current facility-administered medications for this encounter. OBJECTIVE:  Alert and fully ambulatory. Pleasant and conversant. Physical Examination: General appearance - alert, well appearing, and in no distress. Wt Readings from Last 3 Encounters:   09/20/23 186 lb 12.8 oz (84.7 kg)   09/13/23 187 lb 14.4 oz (85.2 kg)   09/06/23 187 lb 14.4 oz (85.2 kg)         ASSESSMENT/PLAN:     Patient is tolerating treatments well with expected toxicities. RBA were reviewed prior to first fraction and PRN. Current and planned dose reviewed. Goals of treatment and potential side effects were reviewed with the patient PRN.  Treatment imaging has been

## 2023-09-20 NOTE — PATIENT INSTRUCTIONS
Continue daily fractionated radiation therapy as scheduled. Please see weekly OTV note and intial consultation letter in [facility] for clinical details. Shayan Reid. Gypsy Gaucher MD [facility] [address]:  [phone]   FAX: [phone] [address]:  [phone]   FAX:    [phone] [address]:  [phone]   FAX:  [phone]  Email: [email or web address]

## 2023-09-21 ENCOUNTER — HOSPITAL ENCOUNTER (OUTPATIENT)
Dept: RADIATION ONCOLOGY | Age: 73
Discharge: HOME | End: 2023-09-21
Payer: MEDICARE

## 2023-09-21 PROCEDURE — G6002 STEREOSCOPIC X-RAY GUIDANCE: HCPCS | Performed by: RADIOLOGY

## 2023-09-21 PROCEDURE — 77412 RADIATION TX DELIVERY LVL 3: CPT | Performed by: RADIOLOGY

## 2023-09-21 PROCEDURE — 77387 GUIDANCE FOR RADJ TX DLVR: CPT | Performed by: RADIOLOGY

## 2023-09-22 ENCOUNTER — HOSPITAL ENCOUNTER (OUTPATIENT)
Dept: RADIATION ONCOLOGY | Age: 73
Discharge: HOME | End: 2023-09-22
Payer: MEDICARE

## 2023-09-22 PROCEDURE — 77412 RADIATION TX DELIVERY LVL 3: CPT | Performed by: RADIOLOGY

## 2023-09-22 PROCEDURE — 77387 GUIDANCE FOR RADJ TX DLVR: CPT | Performed by: RADIOLOGY

## 2023-09-25 ENCOUNTER — HOSPITAL ENCOUNTER (OUTPATIENT)
Dept: RADIATION ONCOLOGY | Age: 73
Discharge: HOME | End: 2023-09-25
Payer: MEDICARE

## 2023-09-25 PROCEDURE — 77387 GUIDANCE FOR RADJ TX DLVR: CPT | Performed by: RADIOLOGY

## 2023-09-25 PROCEDURE — 77412 RADIATION TX DELIVERY LVL 3: CPT | Performed by: RADIOLOGY

## 2023-09-26 ENCOUNTER — HOSPITAL ENCOUNTER (OUTPATIENT)
Dept: RADIATION ONCOLOGY | Age: 73
Discharge: HOME | End: 2023-09-26
Payer: MEDICARE

## 2023-09-26 PROCEDURE — G6002 STEREOSCOPIC X-RAY GUIDANCE: HCPCS | Performed by: RADIOLOGY

## 2023-09-26 PROCEDURE — 77387 GUIDANCE FOR RADJ TX DLVR: CPT | Performed by: RADIOLOGY

## 2023-09-26 PROCEDURE — 77412 RADIATION TX DELIVERY LVL 3: CPT | Performed by: RADIOLOGY

## 2023-09-27 ENCOUNTER — HOSPITAL ENCOUNTER (OUTPATIENT)
Dept: RADIATION ONCOLOGY | Age: 73
Discharge: HOME | End: 2023-09-27
Payer: MEDICARE

## 2023-09-27 VITALS
HEART RATE: 61 BPM | OXYGEN SATURATION: 97 % | BODY MASS INDEX: 31.93 KG/M2 | RESPIRATION RATE: 18 BRPM | TEMPERATURE: 98.1 F | SYSTOLIC BLOOD PRESSURE: 130 MMHG | DIASTOLIC BLOOD PRESSURE: 67 MMHG | WEIGHT: 186 LBS

## 2023-09-27 DIAGNOSIS — C50.919 MALIGNANT NEOPLASM OF FEMALE BREAST, UNSPECIFIED ESTROGEN RECEPTOR STATUS, UNSPECIFIED LATERALITY, UNSPECIFIED SITE OF BREAST (HCC): Primary | ICD-10-CM

## 2023-09-27 PROCEDURE — G6002 STEREOSCOPIC X-RAY GUIDANCE: HCPCS | Performed by: RADIOLOGY

## 2023-09-27 PROCEDURE — 77412 RADIATION TX DELIVERY LVL 3: CPT | Performed by: RADIOLOGY

## 2023-09-27 PROCEDURE — 77336 RADIATION PHYSICS CONSULT: CPT | Performed by: RADIOLOGY

## 2023-09-27 PROCEDURE — NBSRV NON-BILLABLE SERVICE: Performed by: RADIOLOGY

## 2023-09-27 PROCEDURE — 77387 GUIDANCE FOR RADJ TX DLVR: CPT | Performed by: RADIOLOGY

## 2023-09-27 NOTE — PATIENT INSTRUCTIONS
REGINA Gray. Jeff Rbui MD [address] [facility] Oncology  Cell: [phone]    [facility]:  [phone]   FAX: [phone] [address]:   [address] [place] [address] Avenue:    [phone]  [address] [place] [address] Ct:  [phone]   FAX:  [phone]    Email: [email or web address]

## 2023-09-27 NOTE — PROGRESS NOTES
DEPARTMENT OF RADIATION ONCOLOGY ON TREATMENT VISIT         9/27/2023      NAME:  India Rogers    YOB: 1950    Diagnosis: breast cancer    SUBJECTIVE:   India Rogers has now received fractionated external beam radiation therapy - comp 9/27/23. Past medical, surgical, social and family histories reviewed and updated as indicated. Pain: controlled    ALLERGIES:  Adhesive tape, Medrol [methylprednisolone], Codeine, Darvocet a500 [propoxyphene n-acetaminophen], and Sulfa antibiotics         Current Outpatient Medications   Medication Sig Dispense Refill    sucralfate (CARAFATE) 1 GM/10ML suspension Take 10 mLs by mouth 4 times daily 1200 mL 3    lidocaine viscous hcl-diphenhydrAMINE-nystatin SWISH AND SPIT 10 milliliters by mouth four times a day      amitriptyline (ELAVIL) 10 MG tablet Take 1 tablet by mouth nightly Takes 6 tablets      propranolol (INDERAL) 10 MG tablet Take 1 tablet by mouth 3 times daily      ARMOUR THYROID PO Take 60 mg by mouth daily       LISINOPRIL PO Take 20 mg by mouth daily 3pm      ALPRAZolam (XANAX) 0.5 MG tablet Take 1 tablet by mouth nightly as needed for Sleep.      traMADol (ULTRAM) 50 MG tablet Take 1 tablet by mouth nightly. Pregabalin (LYRICA PO) Take 50 mg by mouth nightly        No current facility-administered medications for this encounter. OBJECTIVE:  Alert and fully ambulatory. Pleasant and conversant. Physical Examination: General appearance - alert, well appearing, and in no distress.  -alopecia resolved        Wt Readings from Last 3 Encounters:   09/27/23 186 lb (84.4 kg)   09/20/23 186 lb 12.8 oz (84.7 kg)   09/13/23 187 lb 14.4 oz (85.2 kg)         ASSESSMENT/PLAN:     Patient is tolerating treatments well with expected toxicities. RBA were reviewed prior to first fraction and PRN. Current and planned dose reviewed. Goals of treatment and potential side effects were reviewed with the patient PRN.  Treatment

## 2023-11-06 ENCOUNTER — HOSPITAL ENCOUNTER (OUTPATIENT)
Dept: RADIATION ONCOLOGY | Age: 73
Discharge: HOME | End: 2023-11-06

## 2023-11-06 VITALS
HEART RATE: 68 BPM | TEMPERATURE: 98.1 F | BODY MASS INDEX: 32.12 KG/M2 | RESPIRATION RATE: 18 BRPM | WEIGHT: 187.1 LBS | SYSTOLIC BLOOD PRESSURE: 124 MMHG | DIASTOLIC BLOOD PRESSURE: 68 MMHG | OXYGEN SATURATION: 84 %

## 2023-11-06 DIAGNOSIS — I89.0 LYMPHEDEMA OF BREAST: Primary | ICD-10-CM

## 2023-11-06 DIAGNOSIS — Z17.0 MALIGNANT NEOPLASM OF UPPER-OUTER QUADRANT OF LEFT BREAST IN FEMALE, ESTROGEN RECEPTOR POSITIVE (HCC): Primary | ICD-10-CM

## 2023-11-06 DIAGNOSIS — C50.412 MALIGNANT NEOPLASM OF UPPER-OUTER QUADRANT OF LEFT BREAST IN FEMALE, ESTROGEN RECEPTOR POSITIVE (HCC): Primary | ICD-10-CM

## 2023-11-10 ENCOUNTER — OFFICE VISIT (OUTPATIENT)
Dept: ONCOLOGY | Age: 73
End: 2023-11-10

## 2023-11-10 ENCOUNTER — HOSPITAL ENCOUNTER (OUTPATIENT)
Dept: INFUSION THERAPY | Age: 73
Discharge: HOME | End: 2023-11-10
Payer: MEDICARE

## 2023-11-10 ENCOUNTER — HOSPITAL ENCOUNTER (OUTPATIENT)
Dept: OCCUPATIONAL THERAPY | Age: 73
Setting detail: THERAPIES SERIES
Discharge: HOME | End: 2023-11-10
Payer: MEDICARE

## 2023-11-10 VITALS
BODY MASS INDEX: 31.96 KG/M2 | HEART RATE: 67 BPM | SYSTOLIC BLOOD PRESSURE: 135 MMHG | OXYGEN SATURATION: 97 % | TEMPERATURE: 97.9 F | WEIGHT: 187.2 LBS | HEIGHT: 64 IN | DIASTOLIC BLOOD PRESSURE: 63 MMHG

## 2023-11-10 DIAGNOSIS — Z78.0 POSTMENOPAUSAL: ICD-10-CM

## 2023-11-10 DIAGNOSIS — Z85.3 PERSONAL HISTORY OF BREAST CANCER: Primary | ICD-10-CM

## 2023-11-10 LAB
ALBUMIN SERPL-MCNC: 4 G/DL (ref 3.5–5.2)
ALP SERPL-CCNC: 87 U/L (ref 35–104)
ALT SERPL-CCNC: 10 U/L (ref 0–32)
ANION GAP SERPL CALCULATED.3IONS-SCNC: 10 MMOL/L (ref 7–16)
AST SERPL-CCNC: 17 U/L (ref 0–31)
BASOPHILS # BLD: 0.05 K/UL (ref 0–0.2)
BASOPHILS NFR BLD: 1 % (ref 0–2)
BILIRUB SERPL-MCNC: 0.4 MG/DL (ref 0–1.2)
BUN SERPL-MCNC: 11 MG/DL (ref 6–23)
CALCIUM SERPL-MCNC: 9.3 MG/DL (ref 8.6–10.2)
CHLORIDE SERPL-SCNC: 106 MMOL/L (ref 98–107)
CO2 SERPL-SCNC: 25 MMOL/L (ref 22–29)
CREAT SERPL-MCNC: 0.8 MG/DL (ref 0.5–1)
EOSINOPHIL # BLD: 0.19 K/UL (ref 0.05–0.5)
EOSINOPHILS RELATIVE PERCENT: 4 % (ref 0–6)
ERYTHROCYTE [DISTWIDTH] IN BLOOD BY AUTOMATED COUNT: 13.6 % (ref 11.5–15)
GFR SERPL CREATININE-BSD FRML MDRD: >60 ML/MIN/1.73M2
GLUCOSE SERPL-MCNC: 96 MG/DL (ref 74–99)
HCT VFR BLD AUTO: 37.8 % (ref 34–48)
HGB BLD-MCNC: 12.3 G/DL (ref 11.5–15.5)
IMM GRANULOCYTES # BLD AUTO: 0.03 K/UL (ref 0–0.58)
IMM GRANULOCYTES NFR BLD: 1 % (ref 0–5)
LYMPHOCYTES NFR BLD: 1.04 K/UL (ref 1.5–4)
LYMPHOCYTES RELATIVE PERCENT: 21 % (ref 20–42)
MAGNESIUM SERPL-MCNC: 1.9 MG/DL (ref 1.6–2.6)
MCH RBC QN AUTO: 30.3 PG (ref 26–35)
MCHC RBC AUTO-ENTMCNC: 32.5 G/DL (ref 32–34.5)
MCV RBC AUTO: 93.1 FL (ref 80–99.9)
MONOCYTES NFR BLD: 0.67 K/UL (ref 0.1–0.95)
MONOCYTES NFR BLD: 14 % (ref 2–12)
NEUTROPHILS NFR BLD: 60 % (ref 43–80)
NEUTS SEG NFR BLD: 2.95 K/UL (ref 1.8–7.3)
PLATELET # BLD AUTO: 166 K/UL (ref 130–450)
PMV BLD AUTO: 9.7 FL (ref 7–12)
POTASSIUM SERPL-SCNC: 4.1 MMOL/L (ref 3.5–5)
PROT SERPL-MCNC: 6.7 G/DL (ref 6.4–8.3)
RBC # BLD AUTO: 4.06 M/UL (ref 3.5–5.5)
SODIUM SERPL-SCNC: 141 MMOL/L (ref 132–146)
WBC OTHER # BLD: 4.9 K/UL (ref 4.5–11.5)

## 2023-11-10 PROCEDURE — 80053 COMPREHEN METABOLIC PANEL: CPT

## 2023-11-10 PROCEDURE — 97165 OT EVAL LOW COMPLEX 30 MIN: CPT | Performed by: OCCUPATIONAL THERAPIST

## 2023-11-10 PROCEDURE — 83735 ASSAY OF MAGNESIUM: CPT

## 2023-11-10 PROCEDURE — 2580000003 HC RX 258: Performed by: INTERNAL MEDICINE

## 2023-11-10 PROCEDURE — 6360000002 HC RX W HCPCS: Performed by: INTERNAL MEDICINE

## 2023-11-10 PROCEDURE — 85025 COMPLETE CBC W/AUTO DIFF WBC: CPT

## 2023-11-10 PROCEDURE — 36591 DRAW BLOOD OFF VENOUS DEVICE: CPT

## 2023-11-10 RX ORDER — ANASTROZOLE 1 MG/1
1 TABLET ORAL DAILY
Qty: 90 TABLET | Refills: 3 | Status: SHIPPED | OUTPATIENT
Start: 2023-11-10

## 2023-11-10 RX ORDER — SODIUM CHLORIDE 0.9 % (FLUSH) 0.9 %
5-40 SYRINGE (ML) INJECTION PRN
Status: DISCONTINUED | OUTPATIENT
Start: 2023-11-10 | End: 2023-11-11 | Stop reason: HOSPADM

## 2023-11-10 RX ORDER — HEPARIN 100 UNIT/ML
500 SYRINGE INTRAVENOUS PRN
Status: DISCONTINUED | OUTPATIENT
Start: 2023-11-10 | End: 2023-11-11 | Stop reason: HOSPADM

## 2023-11-10 RX ADMIN — HEPARIN 500 UNITS: 100 SYRINGE at 11:19

## 2023-11-10 RX ADMIN — SODIUM CHLORIDE, PRESERVATIVE FREE 10 ML: 5 INJECTION INTRAVENOUS at 11:19

## 2023-11-10 NOTE — PROGRESS NOTES
[address] [facility] MED ONCOLOGY  [address] [facility] [address] [place] [id number]  Dept: [address] [place] [address] Avenue: [phone]  Attending progress note      Reason for Visit:   Left breast cancer. Referring Physician: Dr. Yang Donato. PCP:  Ursula Webber MD    History of Present Illness:     Mrs. Isabel Altman is a 75-year-old lady, with a past medical history significant for anxiety, hypothyroidism, and peripheral neuropathy, had presented with an abnormal screening mammogram from 5/24/2022, it had revealed new 3 6 mm mass in the upper outer left breast and 19 mm lymph node in the left axilla, on 5/27/2022 she underwent an ultrasound-guided left breast mass core biopsy, pathology had revealed invasive poorly differentiated carcinoma, grade 3, ER +80% DC +90% HER2/alex 2+ by IHC, positive by FISH, the patient had on 6/20/2022 CT scans of the chest, abdomen and pelvis done, revealing enlarged left axillary lymph node measuring just under 2 cm, no evidence of distant metastatic disease, she had a 2D echocardiogram done on 8/28/2022, revealing normal LVEF at 70%, patient was under the care of Dr. Yang Donato, she received the first cycle of TCHP on 8/22/2022, the course was complicated by C. difficile infection which was diagnosed on 9/8/2022. She completed the course of oral vancomycin. The patient had just started to feel rare and like herself again, the diarrhea had resolved. The breast mass had decreased in size. The patient received Maryl Broad on 10/31/2022.   On 11/1/2022, the patient received the second cycle of chemotherapy, TCH, the patient was having brain fog after the chemotherapy, brain MRI was done on 11/25/2022, revealing diffuse wall thickening and FLAIR hyperintensity of the dura, with progression, differential diagnoses include but are not limited to intracranial hypertension, meningitis, neurosarcoidosis, lymphoma, hypertrophic pachymeningitis, meningeal metastasis and postoperative

## 2023-11-10 NOTE — PROGRESS NOTES
Patient refused printed AVS.   Pt verbalized understanding of follow up plan of care. All questions answered. Patient requested doctor appointment card with next visits.

## 2023-11-10 NOTE — PROGRESS NOTES
Occupational Therapy      OCCUPATIONAL THERAPY INITIAL [address] [address]   Phone: [phone]  Fax: [phone]     Date:  11/10/2023  Initial Evaluation Date: 2023   Evaluating Therapist: Claire Mcmillan OT    Patient Name:  Makayla Snyder    :  1950    Restrictions/Precautions:   fall risk  Diagnosis:  Lymphedema of breast (I89.0)       Date of Surgery/Injury: n/a    Insurance/Certification information:  Medicare Part A and B        [id number]  Plan of care signed (Y/N): N  Visit# / total visits: Evaluation    Referring Practitioner:  CONRAD Kumar  Specific Practitioner Orders: Evaluation and Treatment    Assessment of current deficits   []Pain  []Skin Integrity   [x]Lymphedema   []Functional transfers/mobility   []ADLs   []Strength    []Cognition  []IADLs   []Safety Awareness   []  Motor Endurance    []Fine Motor Coordination   []Balance   []Vision/perception  []Sensation []Gross Motor Coordination  []ROM     OT PLAN OF CARE   OT POC based on physician orders, patient diagnosis and results of clinical assessment    Frequency/Duration:  1-3x per week for 12 treatment sessions from 2023 through 2024  Specific OT Treatment to include:     Plan of Care:     [x]97140-Manual Lymph Drainage and Combined Decongestive Therapy  [x]01469- Skilled Multilayer Short Stretch Compression Bandaging/ Therapeutic Exercise  [x]Skin Care Education [x]HEP including Self MLD Education and/or Self Bandaging  [x]Education for Lymphedema Risks/Precautions     [x] Caregiver Education   []other:      Patient Specific Goal: to see if this is going to go away      STG: 3 weeks   Patient will demonstrate knowledge and understanding for lymphedema precautions, skin care and self management to decrease progression of lymphedema and risk of infection.   2.  Patient will present with decreased limb volume in the involved

## 2023-11-13 ENCOUNTER — HOSPITAL ENCOUNTER (OUTPATIENT)
Dept: OCCUPATIONAL THERAPY | Age: 73
Setting detail: THERAPIES SERIES
Discharge: HOME | End: 2023-11-13
Payer: MEDICARE

## 2023-11-13 PROCEDURE — 97530 THERAPEUTIC ACTIVITIES: CPT

## 2023-11-13 PROCEDURE — 97140 MANUAL THERAPY 1/> REGIONS: CPT

## 2023-11-13 NOTE — PROGRESS NOTES
chest.   [] Keratosis: Location/Description  [] Papillomas: Location/Description  [] Other    Color:  [] Normal [] Mottled [] Flushed [x] Other/Description  Location of problem area/s:mod color changes through left breast    Edema:  Edema Location: Left breast  [] 1+ Edema [x] 2+ Edema  [] 3+ Edema [] 4+ Edema  Edema Location:  [] 1+ Edema [] 2+ Edema  [] 3+ Edema [] 4+ Edema  Edema Location:  [] 1+ Edema [] 2+ Edema  [] 3+ Edema [] 4+ Edema    Subjective: Patient wants to reduce swelling and heaviness. Objective:  [] Measurement [x]Manual Lymph Drainage  []Bandaging   []Kinesiotaping [x] Education    []Self Massage   []Self Bandaging [x] Exercise    []Wound Care  [x]Hygiene  [x] Elevation    [x] Other: compression       Assessment:  Knowledge of home program:   [] Good [] Fair  [] Poor  Patient is programming at home:             [] Yes  [] No  Family is assisting with programming: [] Yes  [] No  Home programming is consistent:             [] Yes  [] No  Other:   Response to treatment: Patient showing good response to treatment. Instructions for patient:   [x] Verbal [x] Written  Instructions addressed:Information provided to patient on proper massage stretching of skin and hand placement.     Time In: 1400            Time Out: 1500                      Timed Code Treatment Minutes: 60 minutes      CODE  Minutes  Units   30543 OT Eval Low     95206 OT Eval Medium     90367 OT Eval High     88395 Fluidotherapy     73344 Manual 46 3   63048 Therapeutic Ex     21410 Therapeutic Activity 14  1   27371 ADL/COMP Tech Train     73112 Neuromuscular Re-Ed     21222 OrthoManagementTraining     88213 Paraffin     12822 Electrical Stim - Attended     [id number] Iontophoresis     26278 Ultrasound      Other               Plan: Continue to address:  []Bandaging  [] Self Bandaging/Family Assist  [x]MLD  [x]Self Massage  [x]Exercise  [x]Education  [x]Obtain referral for garments  []Medical Hold  []Discharge to [address] [place] [address]

## 2023-11-15 ENCOUNTER — HOSPITAL ENCOUNTER (OUTPATIENT)
Dept: OCCUPATIONAL THERAPY | Age: 73
Setting detail: THERAPIES SERIES
Discharge: HOME | End: 2023-11-15
Payer: MEDICARE

## 2023-11-15 PROCEDURE — 97140 MANUAL THERAPY 1/> REGIONS: CPT

## 2023-11-15 PROCEDURE — 97530 THERAPEUTIC ACTIVITIES: CPT

## 2023-11-15 NOTE — PROGRESS NOTES
Occupational Therapy      OCCUPATIONAL THERAPY PROGRESS NOTE  Phone: [phone]  Fax: [phone]     Date:  11/15/2023  Initial Evaluation Date: 2023                                   Evaluating Therapist: Claire Mcmillan OT     Patient Name:  Makayla Snyder                   :  1950     Restrictions/Precautions:   fall risk  Diagnosis:  Lymphedema of breast (I89.0)                                                     Date of Surgery/Injury: n/a     Insurance/Certification information:  Medicare Part A and B        [id number]  Plan of care signed (Y/N): N  Visit# / total visits: Evaluation +3 treatment     Referring Practitioner:  ENZO KumarCNP  Specific Practitioner Orders: Evaluation and Treatment     Assessment of current deficits   []Pain  []Skin Integrity   [x]Lymphedema   []Functional transfers/mobility   []ADLs   []Strength    []Cognition  []IADLs   []Safety Awareness   []  Motor Endurance    []Fine Motor Coordination   []Balance   []Vision/perception  []Sensation []Gross Motor Coordination  []ROM      OT PLAN OF CARE   OT POC based on physician orders, patient diagnosis and results of clinical assessment     Frequency/Duration:  1-3x per week for 12 treatment sessions from 2023 through 2024  Specific OT Treatment to include:      Plan of Care:   [x]97140-Manual Lymph Drainage and Combined Decongestive Therapy  [x]95248- Skilled Multilayer Short Stretch Compression Bandaging/ Therapeutic Exercise  [x]Skin Care Education           [x]HEP including Self MLD Education and/or Self Bandaging  [x]Education for Lymphedema Risks/Precautions     [x] Caregiver Education   []other:     Patient Specific Goal: to see if this is going to go away     STG: 3 weeks   Patient will demonstrate knowledge and understanding for lymphedema precautions, skin care and self management to decrease progression of lymphedema and risk of infection.   Patient  and  educated on proper

## 2023-11-20 ENCOUNTER — HOSPITAL ENCOUNTER (OUTPATIENT)
Dept: OCCUPATIONAL THERAPY | Age: 73
Setting detail: THERAPIES SERIES
Discharge: HOME | End: 2023-11-20
Payer: MEDICARE

## 2023-11-20 PROCEDURE — 97530 THERAPEUTIC ACTIVITIES: CPT

## 2023-11-20 PROCEDURE — 97140 MANUAL THERAPY 1/> REGIONS: CPT

## 2023-11-20 NOTE — PROGRESS NOTES
Occupational Therapy      OCCUPATIONAL THERAPY PROGRESS NOTE  Phone: [phone]  Fax: [phone]     Date:  2023  Initial Evaluation Date: 2023                                   Evaluating Therapist: Nola aRe OT     Patient Name:  Janice Berkowitz                   :  1950     Restrictions/Precautions:   fall risk  Diagnosis:  Lymphedema of breast (I89.0)                                                     Date of Surgery/Injury: n/a     Insurance/Certification information:  Medicare Part A and B        [id number]  Plan of care signed (Y/N): N  Visit# / total visits: Evaluation +4 treatment     Referring Practitioner:  CONRAD Pozo  Specific Practitioner Orders: Evaluation and Treatment     Assessment of current deficits   []Pain  []Skin Integrity   [x]Lymphedema   []Functional transfers/mobility   []ADLs   []Strength    []Cognition  []IADLs   []Safety Awareness   []  Motor Endurance    []Fine Motor Coordination   []Balance   []Vision/perception  []Sensation []Gross Motor Coordination  []ROM      OT PLAN OF CARE   OT POC based on physician orders, patient diagnosis and results of clinical assessment     Frequency/Duration:  1-3x per week for 12 treatment sessions from 2023 through 2024  Specific OT Treatment to include:      Plan of Care:   [x]97140-Manual Lymph Drainage and Combined Decongestive Therapy  [x]30465- Skilled Multilayer Short Stretch Compression Bandaging/ Therapeutic Exercise  [x]Skin Care Education           [x]HEP including Self MLD Education and/or Self Bandaging  [x]Education for Lymphedema Risks/Precautions     [x] Caregiver Education   []other:     Patient Specific Goal: to see if this is going to go away     STG: 3 weeks   Patient will demonstrate knowledge and understanding for lymphedema precautions, skin care and self management to decrease progression of lymphedema and risk of infection.   Continued to educate patient and  on

## 2023-11-27 ENCOUNTER — HOSPITAL ENCOUNTER (OUTPATIENT)
Dept: OCCUPATIONAL THERAPY | Age: 73
Setting detail: THERAPIES SERIES
Discharge: HOME | End: 2023-11-27
Payer: MEDICARE

## 2023-11-27 PROCEDURE — 97530 THERAPEUTIC ACTIVITIES: CPT

## 2023-11-27 PROCEDURE — 97140 MANUAL THERAPY 1/> REGIONS: CPT

## 2023-11-29 ENCOUNTER — HOSPITAL ENCOUNTER (OUTPATIENT)
Dept: OCCUPATIONAL THERAPY | Age: 73
Setting detail: THERAPIES SERIES
Discharge: HOME | End: 2023-11-29
Payer: MEDICARE

## 2023-11-29 PROCEDURE — 97140 MANUAL THERAPY 1/> REGIONS: CPT

## 2023-11-29 PROCEDURE — 97530 THERAPEUTIC ACTIVITIES: CPT

## 2023-11-29 NOTE — PROGRESS NOTES
proper lymphedema precautions with focus on skin care and self management of her swelling. Patient and  meeting goal.   2.  Patient will present with decreased limb volume in the involved extremity from moderate to mild for improved functional mobility. Patient and  completed manual lymphatic massage since last visit.  continuing to show good progress with MLD. Therapist continued to inform patient on ROM and door exercises to improve stretching and improve mobility for functional ADL tasks. Will continue to work with patient to improve understanding and independence with massage. 3.  Patient will demonstrate compliance with compression therapy for independent management of lymphedema. Educated patient to obtain a compression bra with fax sent again to [address] [place] [address]. Information also sent for a compression pump with fitting to be done in the next week. Patient concerns on upper extremity pain with movement in arm. Educated patient on compression shirt or compression sleeve to reduce discomfort and reduce swelling throughout upper extremity when completing house management tasks. LT weeks   Patient will be independent with self management of lymphedema including understanding garment wear schedule, self compression bandaging, HEP and self care. 2.  Patient will be fitted for appropriate compression garments for long term management of lymphedema. 3.   Patient will present with decreased limb volume in the involved extremity from mild to trace  for improved functional mobility.       Restrictions/Precautions:  [] No blood pressure/blood draw in: [x] Left Upper Extremity     [] Right Upper Extremity        [x] Fall Risk       Intervention:   []Other    Pain:   [x] No    [] Yes  Location:  Pain Rating (0-10 pain scale):  Pain Description:  Interruption of Treatment [] Yes  [] No    Came to Clinic:  [] Bandaged    []Unbandaged    [] With Stocking     [] With Sleeve     []

## 2023-12-04 ENCOUNTER — HOSPITAL ENCOUNTER (OUTPATIENT)
Dept: OCCUPATIONAL THERAPY | Age: 73
Setting detail: THERAPIES SERIES
Discharge: HOME | End: 2023-12-04
Payer: MEDICARE

## 2023-12-04 PROCEDURE — 97530 THERAPEUTIC ACTIVITIES: CPT

## 2023-12-04 NOTE — PROGRESS NOTES
Occupational Therapy  OCCUPATIONAL THERAPY *** NOTE  [facility] ***    Phone: [phone]  Fax: [phone]     Date:  2023  Initial Evaluation Date: ***   Evaluating Therapist: Norris Escobar OT R/L,     Patient Name:  Kristofer Cassidy    :  1950    Restrictions/Precautions:  ***, fall risk  Diagnosis:  ***       Date of Surgery/Injury: ***    Insurance/Certification information:  ***  Plan of care signed (Y/N): N  Visit#:  ***   Total Visits to date:  ***   Cancels/No-shows to date: ***  Last seen by therapist:  ***  Patient reaction to treatment:  ***  Garment / DME recommended:  ***    Referring Practitioner:  ***  Specific Practitioner Orders: ***    Assessment of current deficits   []Pain  []Skin Integrity   []Lymphedema   []Functional transfers/mobility   []ADLs   []Strength    []Cognition  []IADLs   []Safety Awareness   []  Motor Endurance    []Fine Motor Coordination   []Balance   []Vision/perception  []Sensation []Gross Motor Coordination  []ROM     OT PLAN OF CARE   OT POC based on physician orders, patient diagnosis and results of clinical assessment    Frequency/Duration:  ***  Specific OT Treatment to include:     Plan of Care:     []97140-Manual Lymph Drainage and Combined Decongestive Therapy  []42565- Skilled Multilayer Short Stretch Compression Bandaging/ Therapeutic Exercise  []Skin Care Education []HEP including Self MLD Education and/or Self Bandaging  []Education for Lymphedema Risks/Precautions     [] Caregiver Education   []other:      Patient Specific Goal: ***    Goals Status:    ***    Significant Findings At Last Visit/Comments:    ***  Restrictions/Precautions:  [] No blood pressure/blood draw in: [] Left Upper Extremity     [] Right Upper Extremity        [] Fall Risk       Intervention:   []Other    Subjective:      Rehab Potential: [] Excellent [] Good [] Fair  [] Poor     Goal Status:  [] Achieved [] Partially Achieved  [] Not Achieved     Patient
1500                    Timed Code Treatment Minutes: 30 minutes      CODE  Minutes  Units   24239 OT Eval Low     41785 OT Eval Medium     07606 OT Eval High     05046 Fluidotherapy     57146 Manual     52937 Therapeutic Ex     13935 Therapeutic Activity 30 2   30270 ADL/COMP Tech Train     63774 Neuromuscular Re-Ed     72451 OrthoManagementTraining     85905 Paraffin     39816 Electrical Stim - Attended     [id number] Iontophoresis     72512 Ultrasound      Other               Plan: Continue to address:  []Bandaging  [] Self Bandaging/Family Assist  [x]MLD  [x]Self Massage  [x]Exercise  [x]Education  [x]Obtain referral for garments  []Medical Hold  []Discharge to [facility], [address] [address]

## 2023-12-05 DIAGNOSIS — I89.0 LYMPHEDEMA: Primary | ICD-10-CM

## 2023-12-06 ENCOUNTER — HOSPITAL ENCOUNTER (OUTPATIENT)
Dept: GENERAL RADIOLOGY | Age: 73
Discharge: HOME | End: 2023-12-08
Attending: INTERNAL MEDICINE
Payer: MEDICARE

## 2023-12-06 DIAGNOSIS — Z78.0 POSTMENOPAUSAL: ICD-10-CM

## 2023-12-06 PROCEDURE — 77080 DXA BONE DENSITY AXIAL: CPT

## 2023-12-08 ENCOUNTER — HOSPITAL ENCOUNTER (OUTPATIENT)
Dept: OCCUPATIONAL THERAPY | Age: 73
Setting detail: THERAPIES SERIES
Discharge: HOME | End: 2023-12-08
Payer: MEDICARE

## 2023-12-08 PROCEDURE — 97140 MANUAL THERAPY 1/> REGIONS: CPT

## 2023-12-08 NOTE — PROGRESS NOTES
Patient will present with decreased limb volume in the involved extremity from moderate to mild for improved functional mobility. Patient arrived with improvement shown in skin from  performing sequence last night. Patient and  continues to perform manual lymphatic massage since last visit.  continuing to show good progress with MLD showing improved hand control when working on patient. Therapist continued to educate  on proper techniques to improve MLD and educated patient on ROM and door exercises to improve stretching and improve mobility for functional ADL tasks. Will continue to work with patient to improve understanding and independence with massage. 3.  Patient will demonstrate compliance with compression therapy for independent management of lymphedema. Educated patient to obtain a compression bra with fax will be sent to [address] also initiated to receive a compression chest and sleeve piece. Patient concerns on upper extremity pain with movement in arm. Educated patient on compression shirt or compression sleeve to reduce discomfort and reduce swelling throughout upper extremity when completing house management tasks. LT weeks   Patient will be independent with self management of lymphedema including understanding garment wear schedule, self compression bandaging, HEP and self care. 2.  Patient will be fitted for appropriate compression garments for long term management of lymphedema. 3.   Patient will present with decreased limb volume in the involved extremity from mild to trace  for improved functional mobility.       Restrictions/Precautions:  [] No blood pressure/blood draw in: [x] Left Upper Extremity     [] Right Upper Extremity        [x] Fall Risk       Intervention:   []Other    Pain:   [x] No    [] Yes  Location:  Pain Rating (0-10 pain scale):  Pain Description:  Interruption of Treatment [] Yes  [] No    Came to Clinic:  []

## 2023-12-13 ENCOUNTER — HOSPITAL ENCOUNTER (OUTPATIENT)
Dept: OCCUPATIONAL THERAPY | Age: 73
Setting detail: THERAPIES SERIES
Discharge: HOME | End: 2023-12-13
Payer: MEDICARE

## 2023-12-13 NOTE — PROGRESS NOTES
Occupational Therapy      OCCUPATIONAL THERAPY PROGRESS NOTE  Phone: [phone]  Fax: [phone]     Date: 2023  Patient Name: Janel Overall        : 1950       Patient called in and left a message to cancel due to having to take her dog to the emergency clinic.          Karmen ROJASDuke [facility]  [address]   Date: 2023

## 2023-12-29 ENCOUNTER — HOSPITAL ENCOUNTER (OUTPATIENT)
Dept: OCCUPATIONAL THERAPY | Age: 73
Setting detail: THERAPIES SERIES
Discharge: HOME | End: 2023-12-29
Payer: MEDICARE

## 2023-12-29 ENCOUNTER — HOSPITAL ENCOUNTER (OUTPATIENT)
Dept: INFUSION THERAPY | Age: 73
Discharge: HOME | End: 2023-12-29
Payer: MEDICARE

## 2023-12-29 DIAGNOSIS — Z85.3 PERSONAL HISTORY OF BREAST CANCER: Primary | ICD-10-CM

## 2023-12-29 PROCEDURE — 36591 DRAW BLOOD OFF VENOUS DEVICE: CPT

## 2023-12-29 PROCEDURE — 97140 MANUAL THERAPY 1/> REGIONS: CPT

## 2023-12-29 PROCEDURE — 6360000002 HC RX W HCPCS: Performed by: INTERNAL MEDICINE

## 2023-12-29 PROCEDURE — 2580000003 HC RX 258: Performed by: INTERNAL MEDICINE

## 2023-12-29 RX ORDER — SODIUM CHLORIDE 0.9 % (FLUSH) 0.9 %
5-40 SYRINGE (ML) INJECTION PRN
Status: DISCONTINUED | OUTPATIENT
Start: 2023-12-29 | End: 2023-12-30 | Stop reason: HOSPADM

## 2023-12-29 RX ORDER — HEPARIN 100 UNIT/ML
500 SYRINGE INTRAVENOUS PRN
Status: DISCONTINUED | OUTPATIENT
Start: 2023-12-29 | End: 2023-12-30 | Stop reason: HOSPADM

## 2023-12-29 RX ADMIN — SODIUM CHLORIDE, PRESERVATIVE FREE 10 ML: 5 INJECTION INTRAVENOUS at 13:33

## 2023-12-29 RX ADMIN — HEPARIN 500 UNITS: 100 SYRINGE at 13:33

## 2023-12-29 NOTE — PROGRESS NOTES
Occupational Therapy      OCCUPATIONAL THERAPY PROGRESS NOTE  Phone: [phone]  Fax: [phone]     Date:  2023  Initial Evaluation Date: 2023                                   Evaluating Therapist: Chiara Gutierrez OT     Patient Name:  Piter Mendoza                   :  1950     Restrictions/Precautions:   fall risk  Diagnosis:  Lymphedema of breast (I89.0)                                                     Date of Surgery/Injury: n/a     Insurance/Certification information:  Medicare Part A and B        [id number]  Plan of care signed (Y/N): N  Visit# / total visits: Evaluation + 11 treatment ( Update on 23 - on 5th visit). Referring Practitioner:  CONRAD Limon  Specific Practitioner Orders: Evaluation and Treatment     Assessment of current deficits   []Pain  []Skin Integrity   [x]Lymphedema   []Functional transfers/mobility   []ADLs   []Strength    []Cognition  []IADLs   []Safety Awareness   []  Motor Endurance    []Fine Motor Coordination   []Balance   []Vision/perception  []Sensation []Gross Motor Coordination  []ROM      OT PLAN OF CARE   OT POC based on physician orders, patient diagnosis and results of clinical assessment     Frequency/Duration:  1-3x per week for 12 treatment sessions from 2023 through 2024  Specific OT Treatment to include:      Plan of Care:   [x]97140-Manual Lymph Drainage and Combined Decongestive Therapy  [x]42634- Skilled Multilayer Short Stretch Compression Bandaging/ Therapeutic Exercise  [x]Skin Care Education           [x]HEP including Self MLD Education and/or Self Bandaging  [x]Education for Lymphedema Risks/Precautions     [x] Caregiver Education   []other:     Patient Specific Goal: to see if this is going to go away     STG: 3 weeks   Patient will demonstrate knowledge and understanding for lymphedema precautions, skin care and self management to decrease progression of lymphedema and risk of infection.

## 2024-01-24 ENCOUNTER — HOSPITAL ENCOUNTER (OUTPATIENT)
Dept: OCCUPATIONAL THERAPY | Age: 74
Setting detail: THERAPIES SERIES
Discharge: HOME | End: 2024-01-24

## 2024-01-24 NOTE — PROGRESS NOTES
Occupational Therapy          [facility]  [facility]  [address] [address]  Dept: [phone]  Loc: [phone]   [facility] fax [phone]    Cancellation/No Show Note      Date:  2024    Patient Name:  Dotty Shultz     :  1950         PT ID: [id number]    Total missed visits including today: 0       Total number of no shows: 0    For today's appointment patient:   [x]  Cancelled   []  Rescheduled appointment   []  No-show     Reason given by patient:   []  Patient ill   []  Conflicting appointment   []  No transportation   []  Conflict with work   []  No reason given   []  Other:    Comments:                    Comments:  Death in the family.  Patient appointment to be rescheduled.    Electronically signed by:  LEANDRO Collado/L, CLT-JAMES

## 2024-02-06 ENCOUNTER — TELEPHONE (OUTPATIENT)
Dept: OCCUPATIONAL THERAPY | Age: 74
End: 2024-02-06

## 2024-02-06 NOTE — TELEPHONE ENCOUNTER
Therapist returned patient call and asked patient to call therapist 7Feb. 2024 at [facility] at [phone]

## 2024-02-09 ENCOUNTER — HOSPITAL ENCOUNTER (OUTPATIENT)
Dept: INFUSION THERAPY | Age: 74
End: 2024-02-09

## 2024-02-13 ENCOUNTER — HOSPITAL ENCOUNTER (OUTPATIENT)
Dept: OCCUPATIONAL THERAPY | Age: 74
Setting detail: THERAPIES SERIES
Discharge: HOME | End: 2024-02-13
Payer: MEDICARE

## 2024-02-13 PROCEDURE — 97535 SELF CARE MNGMENT TRAINING: CPT | Performed by: OCCUPATIONAL THERAPIST

## 2024-02-13 PROCEDURE — 97535 SELF CARE MNGMENT TRAINING: CPT

## 2024-02-14 NOTE — PROGRESS NOTES
Occupational Therapy      OCCUPATIONAL THERAPY PROGRESS NOTE  Phone: [phone]  Fax: [phone]     Date:  2024  Initial Evaluation Date: 2023                                   Evaluating Therapist: Noah Arenas OT     Patient Name:  Dotty Shultz                   :  1950     Restrictions/Precautions:   fall risk  Diagnosis:  Lymphedema of breast (I89.0)                                                     Date of Surgery/Injury: n/a     Insurance/Certification information:  Medicare Part A and B        [id number]  Plan of care signed (Y/N): N  Visit# / total visits: Evaluation + 12 treatment ( Update on 24 - visit).      Referring Practitioner:  Charlene Ashford APRN-CNP  Specific Practitioner Orders: Evaluation and Treatment     Assessment of current deficits   []Pain  []Skin Integrity   [x]Lymphedema   []Functional transfers/mobility   []ADLs   []Strength    []Cognition  []IADLs   []Safety Awareness   []  Motor Endurance    []Fine Motor Coordination   []Balance   []Vision/perception  []Sensation []Gross Motor Coordination  []ROM      OT PLAN OF CARE   OT POC based on physician orders, patient diagnosis and results of clinical assessment     Frequency/Duration:  1-3x per week for 12 treatment sessions from 2023 through 2024  Specific OT Treatment to include:      Plan of Care:   [x]97140-Manual Lymph Drainage and Combined Decongestive Therapy  [x]75638- Skilled Multilayer Short Stretch Compression Bandaging/ Therapeutic Exercise  [x]Skin Care Education           [x]HEP including Self MLD Education and/or Self Bandaging  [x]Education for Lymphedema Risks/Precautions     [x] Caregiver Education   []other:     Patient Specific Goal: to see if this is going to go away     Goals Status:    STG: 3 weeks   Patient will demonstrate knowledge and understanding for lymphedema precautions, skin care and self management to decrease progression of lymphedema and risk of 
independent management of lymphedema.  Therapist continued patient education regarding compression garments.  Therapist discussed acquiring properly fitting garment to assist with fluid management.  Patient has not received from DME.  Therapist contacted DME while patient in clinic.  DME stated leaving messages for patient without return call.  Therapist assisted patient to secure appointment this week.  Therapist to follow up once garment is received.  Patient goal partially met.  (Total:  10min)     LT weeks   Patient will be independent with self management of lymphedema including understanding garment wear schedule, self compression bandaging, HEP and self care.  Therapist continued patient education regarding independent management of lymphedema.  Therapist discussed current home management to include how to adjust as needed, increasing activity levels at home, manual lymphatic drainage massage, compression garment options, and low sodium eating lifestyle.  Patient able to verbalize understanding.  Patient goal partially met.  (Total:  15min)  2.  Patient will be fitted for appropriate compression garments for long term management of lymphedema.  Therapist continued patient education regarding need for compression bra.  Lengthy discussion regarding issues with underwire bra use.  Patient able to verbalize understanding.  Patient currently utilizes sports bra.  Therapist assisted with securing appointment with DME to see a certified fitter.  Therapist to monitor and follow up.  Patient goal partially met.    (Total:  10min)  3.   Patient will present with decreased limb volume in the involved extremity from mild to trace  for improved functional mobility.   Goal not met    Significant Findings At Last Visit/Comments:    patient making steady progress toward goals. Patient has received lymphedema pump and utilizes once per day.  Patient stated she uses in the evenings but finds she feels more full in the

## 2024-02-15 ENCOUNTER — HOSPITAL ENCOUNTER (OUTPATIENT)
Dept: OCCUPATIONAL THERAPY | Age: 74
Setting detail: THERAPIES SERIES
Discharge: HOME | End: 2024-02-15
Payer: MEDICARE

## 2024-02-15 PROCEDURE — 97140 MANUAL THERAPY 1/> REGIONS: CPT

## 2024-02-15 PROCEDURE — 97535 SELF CARE MNGMENT TRAINING: CPT

## 2024-02-15 NOTE — PROGRESS NOTES
Occupational Therapy      OCCUPATIONAL THERAPY PROGRESS NOTE  Phone: [phone]  Fax: [phone]     Date:  2/15/2024  Initial Evaluation Date: 2023                                   Evaluating Therapist: Noah Arenas OT     Patient Name:  Dotty Shultz                   :  1950     Restrictions/Precautions:   fall risk  Diagnosis:  Lymphedema of breast (I89.0)                                                     Date of Surgery/Injury: n/a     Insurance/Certification information:  Medicare Part A and B        [id number]  Plan of care signed (Y/N): N  Visit# / total visits: Evaluation + 2 treatment ( Update on 24).     Referring Practitioner:  Charlene Ashford APRN-CNP  Specific Practitioner Orders: Evaluation and Treatment     Assessment of current deficits   []Pain  []Skin Integrity   [x]Lymphedema   []Functional transfers/mobility   []ADLs   []Strength    []Cognition  []IADLs   []Safety Awareness   []  Motor Endurance    []Fine Motor Coordination   []Balance   []Vision/perception  []Sensation []Gross Motor Coordination  []ROM      OT PLAN OF CARE   OT POC based on physician orders, patient diagnosis and results of clinical assessment     Frequency/Duration:  1-3x per week for 12 treatment sessions from 2023 through 2024  Specific OT Treatment to include:      Plan of Care:   [x]97140-Manual Lymph Drainage and Combined Decongestive Therapy  [x]18545- Skilled Multilayer Short Stretch Compression Bandaging/ Therapeutic Exercise  [x]Skin Care Education           [x]HEP including Self MLD Education and/or Self Bandaging  [x]Education for Lymphedema Risks/Precautions     [x] Caregiver Education   []other:     Patient Specific Goal: to see if this is going to go away     Goals Status:    STG: 3 weeks   Patient will demonstrate knowledge and understanding for lymphedema precautions, skin care and self management to decrease progression of lymphedema and risk of infection.  Patient

## 2024-02-20 ENCOUNTER — HOSPITAL ENCOUNTER (OUTPATIENT)
Dept: OCCUPATIONAL THERAPY | Age: 74
Setting detail: THERAPIES SERIES
Discharge: HOME | End: 2024-02-20
Payer: MEDICARE

## 2024-02-20 PROCEDURE — 97140 MANUAL THERAPY 1/> REGIONS: CPT

## 2024-02-20 PROCEDURE — 97535 SELF CARE MNGMENT TRAINING: CPT

## 2024-02-20 NOTE — PROGRESS NOTES
Occupational Therapy      OCCUPATIONAL THERAPY PROGRESS NOTE  Phone: [phone]  Fax: [phone]     Date:  2024  Initial Evaluation Date: 2023                                   Evaluating Therapist: Noah Arenas OT     Patient Name:  Dotty Shultz                   :  1950     Restrictions/Precautions:   fall risk  Diagnosis:  Lymphedema of breast (I89.0)                                                     Date of Surgery/Injury: n/a     Insurance/Certification information:  Medicare Part A and B        [id number]  Plan of care signed (Y/N): N  Visit# / total visits: Evaluation + 3 treatment ( Update on 24).     Referring Practitioner:  Charlene Ashford APRN-CNP  Specific Practitioner Orders: Evaluation and Treatment     Assessment of current deficits   []Pain  []Skin Integrity   [x]Lymphedema   []Functional transfers/mobility   []ADLs   []Strength    []Cognition  []IADLs   []Safety Awareness   []  Motor Endurance    []Fine Motor Coordination   []Balance   []Vision/perception  []Sensation []Gross Motor Coordination  []ROM      OT PLAN OF CARE   OT POC based on physician orders, patient diagnosis and results of clinical assessment     Frequency/Duration: 1-2x per week for 12 treatment sessions from 2024 through 2024   Specific OT Treatment to include:      Plan of Care:   [x]97140-Manual Lymph Drainage and Combined Decongestive Therapy  [x]12177- Skilled Multilayer Short Stretch Compression Bandaging/ Therapeutic Exercise  [x]Skin Care Education           [x]HEP including Self MLD Education and/or Self Bandaging  [x]Education for Lymphedema Risks/Precautions     [x] Caregiver Education   []other:     Patient Specific Goal: to see if this is going to go away     Goals Status:    STG: 3 weeks   Patient will demonstrate knowledge and understanding for lymphedema precautions, skin care and self management to decrease progression of lymphedema and risk of infection.  Patient

## 2024-02-21 DIAGNOSIS — Z85.3 PERSONAL HISTORY OF BREAST CANCER: Primary | ICD-10-CM

## 2024-02-23 ENCOUNTER — OFFICE VISIT (OUTPATIENT)
Dept: ONCOLOGY | Age: 74
End: 2024-02-23
Payer: MEDICARE

## 2024-02-23 ENCOUNTER — HOSPITAL ENCOUNTER (OUTPATIENT)
Dept: INFUSION THERAPY | Age: 74
Discharge: HOME | End: 2024-02-23
Payer: MEDICARE

## 2024-02-23 VITALS
BODY MASS INDEX: 32.23 KG/M2 | DIASTOLIC BLOOD PRESSURE: 65 MMHG | HEART RATE: 74 BPM | TEMPERATURE: 97 F | OXYGEN SATURATION: 95 % | HEIGHT: 64 IN | WEIGHT: 188.8 LBS | SYSTOLIC BLOOD PRESSURE: 141 MMHG

## 2024-02-23 DIAGNOSIS — Z12.39 BREAST SCREENING: Primary | ICD-10-CM

## 2024-02-23 DIAGNOSIS — Z85.3 PERSONAL HISTORY OF BREAST CANCER: ICD-10-CM

## 2024-02-23 DIAGNOSIS — Z79.899 OTHER LONG TERM (CURRENT) DRUG THERAPY: ICD-10-CM

## 2024-02-23 DIAGNOSIS — Z85.3 PERSONAL HISTORY OF BREAST CANCER: Primary | ICD-10-CM

## 2024-02-23 DIAGNOSIS — Z12.31 ENCOUNTER FOR SCREENING MAMMOGRAM FOR MALIGNANT NEOPLASM OF BREAST: ICD-10-CM

## 2024-02-23 DIAGNOSIS — A04.71 RECURRENT CLOSTRIDIUM DIFFICILE DIARRHEA: ICD-10-CM

## 2024-02-23 LAB
ALBUMIN SERPL-MCNC: 3.9 G/DL (ref 3.5–5.2)
ALP SERPL-CCNC: 110 U/L (ref 35–104)
ALT SERPL-CCNC: 9 U/L (ref 0–32)
ANION GAP SERPL CALCULATED.3IONS-SCNC: 9 MMOL/L (ref 7–16)
AST SERPL-CCNC: 13 U/L (ref 0–31)
BASOPHILS # BLD: 0.05 K/UL (ref 0–0.2)
BASOPHILS NFR BLD: 1 % (ref 0–2)
BILIRUB SERPL-MCNC: 0.4 MG/DL (ref 0–1.2)
BUN SERPL-MCNC: 15 MG/DL (ref 6–23)
CALCIUM SERPL-MCNC: 9.3 MG/DL (ref 8.6–10.2)
CHLORIDE SERPL-SCNC: 104 MMOL/L (ref 98–107)
CO2 SERPL-SCNC: 27 MMOL/L (ref 22–29)
CREAT SERPL-MCNC: 0.8 MG/DL (ref 0.5–1)
EOSINOPHIL # BLD: 0.13 K/UL (ref 0.05–0.5)
EOSINOPHILS RELATIVE PERCENT: 3 % (ref 0–6)
ERYTHROCYTE [DISTWIDTH] IN BLOOD BY AUTOMATED COUNT: 13.2 % (ref 11.5–15)
GFR SERPL CREATININE-BSD FRML MDRD: >60 ML/MIN/1.73M2
GLUCOSE SERPL-MCNC: 104 MG/DL (ref 74–99)
HCT VFR BLD AUTO: 39.1 % (ref 34–48)
HGB BLD-MCNC: 12.8 G/DL (ref 11.5–15.5)
IMM GRANULOCYTES # BLD AUTO: <0.03 K/UL (ref 0–0.58)
IMM GRANULOCYTES NFR BLD: 0 % (ref 0–5)
LYMPHOCYTES NFR BLD: 0.92 K/UL (ref 1.5–4)
LYMPHOCYTES RELATIVE PERCENT: 20 % (ref 20–42)
MCH RBC QN AUTO: 30 PG (ref 26–35)
MCHC RBC AUTO-ENTMCNC: 32.7 G/DL (ref 32–34.5)
MCV RBC AUTO: 91.6 FL (ref 80–99.9)
MONOCYTES NFR BLD: 0.53 K/UL (ref 0.1–0.95)
MONOCYTES NFR BLD: 11 % (ref 2–12)
NEUTROPHILS NFR BLD: 65 % (ref 43–80)
NEUTS SEG NFR BLD: 3.05 K/UL (ref 1.8–7.3)
PLATELET # BLD AUTO: 161 K/UL (ref 130–450)
PMV BLD AUTO: 9.2 FL (ref 7–12)
POTASSIUM SERPL-SCNC: 4.7 MMOL/L (ref 3.5–5)
PROT SERPL-MCNC: 6.8 G/DL (ref 6.4–8.3)
RBC # BLD AUTO: 4.27 M/UL (ref 3.5–5.5)
SODIUM SERPL-SCNC: 140 MMOL/L (ref 132–146)
WBC OTHER # BLD: 4.7 K/UL (ref 4.5–11.5)

## 2024-02-23 PROCEDURE — 36591 DRAW BLOOD OFF VENOUS DEVICE: CPT

## 2024-02-23 PROCEDURE — 80053 COMPREHEN METABOLIC PANEL: CPT

## 2024-02-23 PROCEDURE — 99214 OFFICE O/P EST MOD 30 MIN: CPT

## 2024-02-23 PROCEDURE — 3017F COLORECTAL CA SCREEN DOC REV: CPT | Performed by: INTERNAL MEDICINE

## 2024-02-23 PROCEDURE — 1036F TOBACCO NON-USER: CPT | Performed by: INTERNAL MEDICINE

## 2024-02-23 PROCEDURE — 6360000002 HC RX W HCPCS: Performed by: INTERNAL MEDICINE

## 2024-02-23 PROCEDURE — 85025 COMPLETE CBC W/AUTO DIFF WBC: CPT

## 2024-02-23 PROCEDURE — G8399 PT W/DXA RESULTS DOCUMENT: HCPCS | Performed by: INTERNAL MEDICINE

## 2024-02-23 PROCEDURE — 1123F ACP DISCUSS/DSCN MKR DOCD: CPT | Performed by: INTERNAL MEDICINE

## 2024-02-23 PROCEDURE — 99214 OFFICE O/P EST MOD 30 MIN: CPT | Performed by: INTERNAL MEDICINE

## 2024-02-23 PROCEDURE — G8417 CALC BMI ABV UP PARAM F/U: HCPCS | Performed by: INTERNAL MEDICINE

## 2024-02-23 PROCEDURE — 1090F PRES/ABSN URINE INCON ASSESS: CPT | Performed by: INTERNAL MEDICINE

## 2024-02-23 PROCEDURE — G8427 DOCREV CUR MEDS BY ELIG CLIN: HCPCS | Performed by: INTERNAL MEDICINE

## 2024-02-23 PROCEDURE — G8484 FLU IMMUNIZE NO ADMIN: HCPCS | Performed by: INTERNAL MEDICINE

## 2024-02-23 PROCEDURE — 2580000003 HC RX 258: Performed by: INTERNAL MEDICINE

## 2024-02-23 RX ORDER — HEPARIN 100 UNIT/ML
500 SYRINGE INTRAVENOUS PRN
Status: DISCONTINUED | OUTPATIENT
Start: 2024-02-23 | End: 2024-02-24 | Stop reason: HOSPADM

## 2024-02-23 RX ORDER — SODIUM CHLORIDE 0.9 % (FLUSH) 0.9 %
5-40 SYRINGE (ML) INJECTION PRN
Status: DISCONTINUED | OUTPATIENT
Start: 2024-02-23 | End: 2024-02-24 | Stop reason: HOSPADM

## 2024-02-23 RX ADMIN — SODIUM CHLORIDE, PRESERVATIVE FREE 10 ML: 5 INJECTION INTRAVENOUS at 14:16

## 2024-02-23 RX ADMIN — SODIUM CHLORIDE, PRESERVATIVE FREE 10 ML: 5 INJECTION INTRAVENOUS at 14:13

## 2024-02-23 RX ADMIN — HEPARIN 500 UNITS: 100 SYRINGE at 14:16

## 2024-02-23 NOTE — PROGRESS NOTES
[facility] PHYSICIANS [facility] MED ONCOLOGY  [address] [facility] [address]  Dept: [phone]  Loc: [phone]  Attending progress note      Reason for Visit:   Left breast cancer.    Referring Physician: Dr. Weber.    PCP:  Prasanna Medina MD    History of Present Illness:     Mrs. Shultz is a 73-year-old lady, with a past medical history significant for anxiety, hypothyroidism, and peripheral neuropathy, had presented with an abnormal screening mammogram from 5/24/2022, it had revealed new 3 6 mm mass in the upper outer left breast and 19 mm lymph node in the left axilla, on 5/27/2022 she underwent an ultrasound-guided left breast mass core biopsy, pathology had revealed invasive poorly differentiated carcinoma, grade 3, ER +80% VA +90% HER2/alex 2+ by IHC, positive by FISH, the patient had on 6/20/2022 CT scans of the chest, abdomen and pelvis done, revealing enlarged left axillary lymph node measuring just under 2 cm, no evidence of distant metastatic disease, she had a 2D echocardiogram done on 8/28/2022, revealing normal LVEF at 70%, patient was under the care of Dr. Weber, she received the first cycle of TCHP on 8/22/2022, the course was complicated by C. difficile infection which was diagnosed on 9/8/2022.  She completed the course of oral vancomycin.  The patient had just started to feel rare and like herself again, the diarrhea had resolved.  The breast mass had decreased in size.    The patient received Zinplava on 10/31/2022.  On 11/1/2022, the patient received the second cycle of chemotherapy, TCH, the patient was having brain fog after the chemotherapy, brain MRI was done on 11/25/2022, revealing diffuse wall thickening and FLAIR hyperintensity of the dura, with progression, differential diagnoses include but are not limited to intracranial hypertension, meningitis, neurosarcoidosis, lymphoma, hypertrophic pachymeningitis, meningeal metastasis and postoperative

## 2024-02-27 ENCOUNTER — HOSPITAL ENCOUNTER (OUTPATIENT)
Dept: OCCUPATIONAL THERAPY | Age: 74
Setting detail: THERAPIES SERIES
Discharge: HOME | End: 2024-02-27
Payer: MEDICARE

## 2024-02-27 PROCEDURE — 97140 MANUAL THERAPY 1/> REGIONS: CPT

## 2024-02-27 PROCEDURE — 97535 SELF CARE MNGMENT TRAINING: CPT

## 2024-02-27 NOTE — PROGRESS NOTES
Occupational Therapy      OCCUPATIONAL THERAPY PROGRESS NOTE  Phone: [phone]  Fax: [phone]     Date:  2024  Initial Evaluation Date: 2023                                   Evaluating Therapist: Noah Arenas OT     Patient Name:  Dotty Shultz                   :  1950     Restrictions/Precautions:   fall risk  Diagnosis:  Lymphedema of breast (I89.0)                                                     Date of Surgery/Injury: n/a     Insurance/Certification information:  Medicare Part A and B        [id number]  Plan of care signed (Y/N): N  Visit# / total visits: Evaluation + 4 treatment ( Update on 24).     Referring Practitioner:  Charlene Ashford APRN-CNP  Specific Practitioner Orders: Evaluation and Treatment     Assessment of current deficits   []Pain  []Skin Integrity   [x]Lymphedema   []Functional transfers/mobility   []ADLs   []Strength    []Cognition  []IADLs   []Safety Awareness   []  Motor Endurance    []Fine Motor Coordination   []Balance   []Vision/perception  []Sensation []Gross Motor Coordination  []ROM      OT PLAN OF CARE   OT POC based on physician orders, patient diagnosis and results of clinical assessment     Frequency/Duration: 1-2x per week for 12 treatment sessions from 2024 through 2024   Specific OT Treatment to include:      Plan of Care:   [x]97140-Manual Lymph Drainage and Combined Decongestive Therapy  [x]94260- Skilled Multilayer Short Stretch Compression Bandaging/ Therapeutic Exercise  [x]Skin Care Education           [x]HEP including Self MLD Education and/or Self Bandaging  [x]Education for Lymphedema Risks/Precautions     [x] Caregiver Education   []other:     Patient Specific Goal: to see if this is going to go away     Goals Status:    STG: 3 weeks   Patient will demonstrate knowledge and understanding for lymphedema precautions, skin care and self management to decrease progression of lymphedema and risk of infection.  Patient

## 2024-03-05 ENCOUNTER — HOSPITAL ENCOUNTER (OUTPATIENT)
Dept: OCCUPATIONAL THERAPY | Age: 74
Setting detail: THERAPIES SERIES
Discharge: HOME | End: 2024-03-05
Payer: MEDICARE

## 2024-03-05 PROCEDURE — 97535 SELF CARE MNGMENT TRAINING: CPT

## 2024-03-05 PROCEDURE — 97140 MANUAL THERAPY 1/> REGIONS: CPT

## 2024-03-05 NOTE — PROGRESS NOTES
Occupational Therapy      OCCUPATIONAL THERAPY PROGRESS NOTE  Phone: [phone]  Fax: [phone]     Date:  3/5/2024  Initial Evaluation Date: 2023                                   Evaluating Therapist: Noah Arenas OT     Patient Name:  Dotty Shultz                   :  1950     Restrictions/Precautions:   fall risk  Diagnosis:  Lymphedema of breast (I89.0)                                                     Date of Surgery/Injury: n/a     Insurance/Certification information:  Medicare Part A and B        [id number]  Plan of care signed (Y/N): N  Visit# / total visits: Evaluation + 5 treatment ( Update on 24).     Referring Practitioner:  Charlene Ashford APRN-CNP  Specific Practitioner Orders: Evaluation and Treatment     Assessment of current deficits   []Pain  []Skin Integrity   [x]Lymphedema   []Functional transfers/mobility   []ADLs   []Strength    []Cognition  []IADLs   []Safety Awareness   []  Motor Endurance    []Fine Motor Coordination   []Balance   []Vision/perception  []Sensation []Gross Motor Coordination  []ROM      OT PLAN OF CARE   OT POC based on physician orders, patient diagnosis and results of clinical assessment     Frequency/Duration: 1-2x per week for 12 treatment sessions from 2024 through 2024   Specific OT Treatment to include:      Plan of Care:   [x]97140-Manual Lymph Drainage and Combined Decongestive Therapy  [x]04584- Skilled Multilayer Short Stretch Compression Bandaging/ Therapeutic Exercise  [x]Skin Care Education           [x]HEP including Self MLD Education and/or Self Bandaging  [x]Education for Lymphedema Risks/Precautions     [x] Caregiver Education   []other:     Patient Specific Goal: to see if this is going to go away     Goals Status:    STG: 3 weeks   Patient will demonstrate knowledge and understanding for lymphedema precautions, skin care and self management to decrease progression of lymphedema and risk of infection.  Patient

## 2024-03-07 ENCOUNTER — HOSPITAL ENCOUNTER (OUTPATIENT)
Dept: RADIATION ONCOLOGY | Age: 74
Discharge: HOME | End: 2024-03-07
Payer: MEDICARE

## 2024-03-07 VITALS
TEMPERATURE: 97.6 F | WEIGHT: 190.6 LBS | HEART RATE: 73 BPM | BODY MASS INDEX: 32.72 KG/M2 | SYSTOLIC BLOOD PRESSURE: 137 MMHG | OXYGEN SATURATION: 95 % | RESPIRATION RATE: 18 BRPM | DIASTOLIC BLOOD PRESSURE: 65 MMHG

## 2024-03-07 DIAGNOSIS — C50.412 MALIGNANT NEOPLASM OF UPPER-OUTER QUADRANT OF LEFT BREAST IN FEMALE, ESTROGEN RECEPTOR POSITIVE (HCC): Primary | ICD-10-CM

## 2024-03-07 DIAGNOSIS — Z17.0 MALIGNANT NEOPLASM OF UPPER-OUTER QUADRANT OF LEFT BREAST IN FEMALE, ESTROGEN RECEPTOR POSITIVE (HCC): Primary | ICD-10-CM

## 2024-03-07 DIAGNOSIS — Z92.3 S/P RADIATION THERAPY > 12 WKS AGO: ICD-10-CM

## 2024-03-07 PROCEDURE — 99213 OFFICE O/P EST LOW 20 MIN: CPT | Performed by: NURSE PRACTITIONER

## 2024-03-07 PROCEDURE — 99213 OFFICE O/P EST LOW 20 MIN: CPT

## 2024-03-07 NOTE — PROGRESS NOTES
Dotty Shultz  3/7/2024  8:43 AM      Vitals:    03/07/24 0842   BP: 137/65   Pulse: 73   Resp: 18   Temp: 97.6 °F (36.4 °C)   SpO2: 95%    :    Wt Readings from Last 3 Encounters:   03/07/24 86.5 kg (190 lb 9.6 oz)   02/23/24 85.6 kg (188 lb 12.8 oz)   11/10/23 84.9 kg (187 lb 3.2 oz)                Current Outpatient Medications:     anastrozole (ARIMIDEX) 1 MG tablet, Take 1 tablet by mouth daily, Disp: 90 tablet, Rfl: 3    lidocaine viscous hcl-diphenhydrAMINE-nystatin, SWISH AND SPIT 10 milliliters by mouth four times a day, Disp: , Rfl:     amitriptyline (ELAVIL) 10 MG tablet, Take 1 tablet by mouth nightly Takes 6 tablets, Disp: , Rfl:     propranolol (INDERAL) 10 MG tablet, Take 1 tablet by mouth 3 times daily, Disp: , Rfl:     ARMOUR THYROID PO, Take 60 mg by mouth daily , Disp: , Rfl:     LISINOPRIL PO, Take 20 mg by mouth daily 3pm, Disp: , Rfl:     ALPRAZolam (XANAX) 0.5 MG tablet, Take 1 tablet by mouth nightly as needed for Sleep., Disp: , Rfl:     traMADol (ULTRAM) 50 MG tablet, Take 1 tablet by mouth nightly., Disp: , Rfl:     Pregabalin (LYRICA PO), Take 50 mg by mouth nightly , Disp: , Rfl:       Patient is seen today in follow up for completion of CTV L breast 8/16/23-9/27/23 30fx/6000cGy.         FALLS RISK SCREENING ASSESSMENT    Instructions:  Assess the patient and enter the appropriate indicators that are present for fall risk identification.   Total the numbers entered and assign a fall risk score from Table 2.  Reassess patient at a minimum every 12 weeks or with status change.    Assessment   Date  3/7/2024     1.  Mental Ability: confusion/cognitively impaired No - 0       2.  Elimination Issues: incontinence, frequency No - 0       3.  Ambulatory: use of assistive devices (walker, cane, off-loading devices), attached to equipment (IV pole, oxygen) No - 0     4.  Sensory Limitations: dizziness, vertigo, impaired vision No - 0       5.  Age 65 years or greater - 1       6.  Medication:

## 2024-03-07 NOTE — PROGRESS NOTES
Radiation Oncology- [facility]   4 Month Follow Up Note    03/07/2024     Dotty Shultz  Vitals:    03/07/24 0842   BP: 137/65   Pulse: 73   Resp: 18   Temp: 97.6 °F (36.4 °C)   SpO2: 95%     Wt Readings from Last 1 Encounters:   03/07/24 86.5 kg (190 lb 9.6 oz)       CC: Patient is here for 4 month Radiation Oncology follow-up visit.    HPI:  Dotty Shultz is a pleasant 73 y.o. female with a diagnosis of invasive ductal carcinoma of the upper-outer quadrant of the left breast, Dx 05/27/2022. ER positive 80%, MN positive 90%, HER2 positive by FISH; Ki67 60%.   S/p neoadjuvant chemotherapy TCH 4 cycles from the dates of 08/22/2022-02/21/2023. Declined HP therapy.   S/p left breast needle localized lumpectomy with SLN excision and subsequent axillary dissection for positive SLN on 05/22/2023.   S/p adjuvant radiation therapy directed to the left breast, RLNs + LSB completed 09/27/2023, total dose 6000 cGy/ 30 fractions.     The patient decided against receiving adjuvant Kadcyla. Recommended for adjuvant endocrine therapy with Arimidex. Rx per Medical Oncology. The patient report filling the prescription but never started the medication after reading potential side effects on medication educational handout. After completing adjuvant radiation therapy the patient was noted to have developed lymphedema. Referral, established and continues to follow with OT/ lymphedema clinic.     Today the patient is seen in 4 month Radiation Oncology follow-up visit. She is accompanied by her  \"Theron\" into the exam room. The patient denies skin irritation to treatment site. She reports ongoing lymphedema to left breast, describes sensation of heaviness, fullness and swelling to the left breast. She continue to follow with OT/ lymphedema clinic, she is performing HEP, wearing compression bra and using lymphedema pump/ sleeve at home. The patient continues to perform self breast exams, she denies new lumps, bumps, skin rashes

## 2024-03-08 DIAGNOSIS — I89.0 LYMPHEDEMA OF BREAST: Primary | ICD-10-CM

## 2024-03-12 ENCOUNTER — HOSPITAL ENCOUNTER (OUTPATIENT)
Dept: OCCUPATIONAL THERAPY | Age: 74
Setting detail: THERAPIES SERIES
Discharge: HOME | End: 2024-03-12
Payer: MEDICARE

## 2024-03-12 ENCOUNTER — HOSPITAL ENCOUNTER (OUTPATIENT)
Dept: GENERAL RADIOLOGY | Age: 74
Discharge: HOME | End: 2024-03-14
Attending: INTERNAL MEDICINE
Payer: MEDICARE

## 2024-03-12 VITALS — HEIGHT: 64 IN | BODY MASS INDEX: 29.02 KG/M2 | WEIGHT: 170 LBS

## 2024-03-12 DIAGNOSIS — Z85.3 PERSONAL HISTORY OF BREAST CANCER: ICD-10-CM

## 2024-03-12 PROCEDURE — 97535 SELF CARE MNGMENT TRAINING: CPT

## 2024-03-12 PROCEDURE — 76642 ULTRASOUND BREAST LIMITED: CPT

## 2024-03-12 PROCEDURE — 97140 MANUAL THERAPY 1/> REGIONS: CPT

## 2024-03-12 NOTE — PROGRESS NOTES
5, 4.5  5.5, 4.75  5.75, 4.75 5.5, 4.75   5.25, 4.5   5.5, 5  6, 5 5.75, 4.75   Fourth Digit    5, 4.25  5, 4.5  4.75, 4.5 5.25, 4.5   5, 5   5, 5  5.5, 5 5.25, 5   Fifth Digit    6  6  6 5.25    6  6  6.25 6                             Assessment:  Knowledge of home program:   [x] Good [] Fair  [] Poor  Patient is programming at home:             [x] Yes  [] No  Family is assisting with programming: [] Yes  [x] No - not able to at this time due to recent surgery.   Home programming is consistent:             [x] Yes  [] No  Other:   Response to treatment: Patient showing good response to treatment.   Instructions for patient:   [x] Verbal [] Written  Instructions addressed: Educated patient to continue to complete manual lymphatic massage with use the pump and wear compression bra daily. Patient also educated patient that the gain is through both arms meaning the trigger to her swelling in environmental. Patient sated she has gained weight and has a tendency to eat chips at night.    Time In: 1315           Time Out: 1415                  Timed Code Treatment Minutes: 60 minutes      CODE  Minutes  Units   41541 OT Eval Low     69780 OT Eval Medium     83387 OT Eval High     97793 Fluidotherapy     69789 Manual 48 3   31484 Therapeutic Ex     67449 Therapeutic Activity     20266 ADL/COMP Tech Train 12 1   60704 Neuromuscular Re-Ed     65427 OrthoManagementTraining     69410 Paraffin     73323 Electrical Stim - Attended     68811 Iontophoresis     39128 Ultrasound      Other               Plan: Continue to address:  []Bandaging  [] Self Bandaging/Family Assist  [x]MLD  [x]Self Massage  [x]Exercise  [x]Education  [x]Obtain referral for garments  []Medical Hold  []Discharge to Home Program  Maricruz MEDRANO/AMALIA, CLT  [id number]

## 2024-03-21 ENCOUNTER — HOSPITAL ENCOUNTER (OUTPATIENT)
Dept: OCCUPATIONAL THERAPY | Age: 74
Setting detail: THERAPIES SERIES
Discharge: HOME | End: 2024-03-21
Payer: MEDICARE

## 2024-03-21 PROCEDURE — 97140 MANUAL THERAPY 1/> REGIONS: CPT

## 2024-03-21 NOTE — PROGRESS NOTES
Occupational Therapy      OCCUPATIONAL THERAPY PROGRESS NOTE  Phone: [phone]  Fax: [phone]     Date:  3/21/2024  Initial Evaluation Date: 2023                                   Evaluating Therapist: Noah Arenas OT     Patient Name:  Dotty Shultz                   :  1950     Restrictions/Precautions:   fall risk  Diagnosis:  Lymphedema of breast (I89.0)                                                     Date of Surgery/Injury: n/a     Insurance/Certification information:  Medicare Part A and B        [id number]  Plan of care signed (Y/N): N  Visit# / total visits: Evaluation + 7 treatment ( Update on 24).     Referring Practitioner:  Charlene Ashford APRN-CNP  Specific Practitioner Orders: Evaluation and Treatment     Assessment of current deficits   []Pain  []Skin Integrity   [x]Lymphedema   []Functional transfers/mobility   []ADLs   []Strength    []Cognition  []IADLs   []Safety Awareness   []  Motor Endurance    []Fine Motor Coordination   []Balance   []Vision/perception  []Sensation []Gross Motor Coordination  []ROM      OT PLAN OF CARE   OT POC based on physician orders, patient diagnosis and results of clinical assessment     Frequency/Duration: 1-2x per week for 12 treatment sessions from 2024 through 2024   Specific OT Treatment to include:      Plan of Care:   [x]97140-Manual Lymph Drainage and Combined Decongestive Therapy  [x]43201- Skilled Multilayer Short Stretch Compression Bandaging/ Therapeutic Exercise  [x]Skin Care Education           [x]HEP including Self MLD Education and/or Self Bandaging  [x]Education for Lymphedema Risks/Precautions     [x] Caregiver Education   []other:     Patient Specific Goal: to see if this is going to go away     Goals Status:    STG: 3 weeks   Patient will demonstrate knowledge and understanding for lymphedema precautions, skin care and self management to decrease progression of lymphedema and risk of infection.  Patient

## 2024-04-08 ENCOUNTER — HOSPITAL ENCOUNTER (OUTPATIENT)
Dept: GENERAL RADIOLOGY | Age: 74
Discharge: HOME | End: 2024-04-10
Attending: INTERNAL MEDICINE
Payer: MEDICARE

## 2024-04-08 VITALS — HEIGHT: 64 IN | BODY MASS INDEX: 30.73 KG/M2 | WEIGHT: 180 LBS

## 2024-04-08 DIAGNOSIS — Z12.39 BREAST SCREENING: ICD-10-CM

## 2024-04-08 DIAGNOSIS — Z12.31 ENCOUNTER FOR SCREENING MAMMOGRAM FOR MALIGNANT NEOPLASM OF BREAST: ICD-10-CM

## 2024-04-08 PROCEDURE — 77063 BREAST TOMOSYNTHESIS BI: CPT

## 2024-04-10 ENCOUNTER — HOSPITAL ENCOUNTER (OUTPATIENT)
Dept: OCCUPATIONAL THERAPY | Age: 74
Setting detail: THERAPIES SERIES
Discharge: HOME | End: 2024-04-10
Payer: MEDICARE

## 2024-04-10 PROCEDURE — 97535 SELF CARE MNGMENT TRAINING: CPT | Performed by: OCCUPATIONAL THERAPIST

## 2024-04-10 PROCEDURE — 97535 SELF CARE MNGMENT TRAINING: CPT

## 2024-04-11 NOTE — PROGRESS NOTES
goal met.     2.  Patient will present with decreased limb volume in the involved extremity from moderate to mild for improved functional mobility.   Patient arrived today with reduced swelling noted through breast and side. Patients spouse has had shoulder surgery and unable to assist the patient. Patient stated she is continuing to use the compression pump and wears the compression bra daily. Patient verbalized understanding and agreement of wearing compression pump at least 3x a week and compression bra daily.  Patient goal met.  (Total: 10 min)    3.  Patient will demonstrate compliance with compression therapy for independent management of lymphedema.  Patient received 3 compression bras and Tamara stated she will get 3 every 3 months.  Compression bra showing good fit on patient. Patient is now alternating wearing all three bras.  Patient measured this date and shows a swelling in bilateral arms with patient stating she has had a weight gain so this looks like environmental more than contribute to the lymphedema in breast at this time. Therapist will continue to monitor patient. Patient goal partially met. (10 min)     LT weeks   Patient will be independent with self management of lymphedema including understanding garment wear schedule, self compression bandaging, HEP and self care.  Continued patient education regarding independent management of lymphedema. Therapist discussed current home management to include how to adjust as needed, increasing activity levels at home, manual lymphatic drainage massage, compression garment options, and low sodium eating lifestyle.  Patient able to verbalize understanding. Patient stated she has not been eating high sodium foods however when asked what she had ate therapist educated patient that she needs to read the ingredients of foods to really reduce sodium intake. She is really trying to reduce the sodium.  Patient goal partially met. (5 min).    2.  Patient 
Follow up #1  Left - 4Dec. 2023 Evaluation -   Left - 10Nov. 2023 Follow up #4  Right -  Follow up #3  Right - 10Apr. 2024 Follow up #2  Right - 13Feb. 2024 Follow up #1 Right - 4Dec. 2023 Evaluation - Right - 10Nov. 2023                             wrist    16.25  16  15.75 16    16.75  16.5  16.5 16.5   4cm    19.25  17.5  18.25 18.5    18.25  17  17.5 17.75   8cm    22.25  21  21 21.5    20.5  20  20.25 20.75   12cm    24.75  23.5  24.25 24    23.75  22.5  23.75 23.25   16cm    27  26.5  26 26.5    25.75  25.5  25.25 25.75   20cm    27.25  23  27.25 27.25    26  25.5  26 26.5   24cm    29.5  27.5  28.25 29    26.5  26.25  26 27   28cm    31.5  30.5  31 30.5    29.25  28.75  28.75 29   32cm    32  31.25  31.75 31.5    31.25  30.25  29.75 30.25   36cm    34.25  33  33.25 33    34  32  31.5 31.75   40cm    37  34.5  34      37.25  35  32.75     44cm                                                                       plam    19.5  19  19.25 19.5    19.75  19  19.75 19.25      Fingers are measured in cm and between mp and pip and between pip and dip each digit.     Digit Follow up #4  Left -  Follow up #3  Left -  Follow up #2  Left -  Follow up #1  Left -  Evaluation -  Left -  Follow up #4  Right -  Follow up #3  Right -  Follow up #2  Right -  Follow up #1  Right -  Evaluation - Right -                                                    First Digit    6.25, 5  6.25, 5  6.25, 5.25 6.25, 5.25    6.5, 5.5  6.5, 5.25  6.5, 5.5 6.25, 5.5   Second Digit    6.5, 5.5  6, 5.5  6, 5.25 6, 5.25    6.25, 5.5  6.25, 5.25  6.25, 5.5 6.25, 5.5   Third Digit    5.75, 4.75  5.5, 4.75  5.75, 4.75 5.5, 4.75    6, 5  5.5, 5  6, 5 5.75, 4.75   Fourth Digit    5, 4.5  5, 4.5  4.75, 4.5 5.25, 4.5    5.25, 5  5, 5  5.5, 5 5.25, 5   Fifth Digit    6.25  6  6 5.25    6.5  6  6.25 6                                   Restrictions/Precautions:  [] No blood pressure/blood draw in: [] Left Upper Extremity     [] Right Upper Extremity

## 2024-05-14 ENCOUNTER — HOSPITAL ENCOUNTER (OUTPATIENT)
Dept: OCCUPATIONAL THERAPY | Age: 74
Setting detail: THERAPIES SERIES
Discharge: HOME | End: 2024-05-14
Payer: MEDICARE

## 2024-05-14 PROCEDURE — 97140 MANUAL THERAPY 1/> REGIONS: CPT

## 2024-05-14 PROCEDURE — 97535 SELF CARE MNGMENT TRAINING: CPT | Performed by: OCCUPATIONAL THERAPIST

## 2024-05-14 NOTE — DISCHARGE SUMMARY
Occupational Therapy  OCCUPATIONAL THERAPY DISCHARGE NOTE  [place] [address] [address] [facility]  [address]              Phone: [phone]             Fax: [phone]      Date:  4/10/2024  Initial Evaluation Date: 2023     Evaluating Therapist: LEANDRO Collado/L, CLT-JAMES    Patient Name:  Dotty Shultz    :  1950    Restrictions/Precautions:  fall risk  Diagnosis:  Lymphedema of breast (I89.0)              Date of Surgery/Injury: N/A    Insurance/Certification information:  Medicare Part A and B        [id number]   Plan of care signed (Y/N): N  Visit#:  9   Total Visits to date:  9   Cancels/No-shows to date: 0  Last seen by therapist:  2024  Patient reaction to treatment:  patient made steady progress toward goals. Patient continues to use of lymphedema pump and utilizes every other day.  Patient further stated pump is no loner working as it did.  Patient stated her arm has discomfort after use and it does not feel like the vest is pumping up that much.  Patient has not contacted [employer] rep to discuss issue.  Patient to call rep to discuss further.  Patient continues to utilize her compression bra daily.  Patient continues to try to maintain a low sodium eating lifestyle to assist with fluid management.   Garment / DME recommended:  compression bra replacements as recommended    Referring Practitioner:  Charlene Ashford, APRN-CNP   Specific Practitioner Orders: Evaluation and Treamtent    Assessment of current deficits   []Pain  []Skin Integrity   [x]Lymphedema   []Functional transfers/mobility   []ADLs   []Strength    []Cognition  []IADLs   []Safety Awareness   []  Motor Endurance    []Fine Motor Coordination   []Balance   []Vision/perception  []Sensation []Gross Motor Coordination  []ROM     OT PLAN OF CARE   OT POC based on physician orders, patient diagnosis and results of clinical assessment    Frequency/Duration:  1-2x per week for 12 treatment

## 2024-05-14 NOTE — PROGRESS NOTES
goal met.     2.  Patient will present with decreased limb volume in the involved extremity from moderate to mild for improved functional mobility.    Patient goal met.     3.  Patient will demonstrate compliance with compression therapy for independent management of lymphedema.  Patient received 3 compression bras and Tamara stated she will get 3 every 3 months. Patient educated on compression sleeve however patient does not want one. Patient goal partially met. (1 min)     LT weeks   Patient will be independent with self management of lymphedema including understanding garment wear schedule, self compression bandaging, HEP and self care.  Continued patient education regarding independent management of lymphedema. Therapist discussed current home management to include how to adjust as needed, increasing activity levels at home, manual lymphatic drainage massage, compression garment options, and low sodium eating lifestyle.  Patient able to verbalize understanding. She is really trying to reduce the sodium.  Patient goal partially met.     2.  Patient will be fitted for appropriate compression garments for long term management of lymphedema.  Continued patient education regarding use of compression bra and compression massage or pump. Patient able to verbalize understanding.  Patient currently utilizes compression bra. Patient is to monitor and follow up that she receives compression bras every 3 months and to call if she does not receive bras.  Patient goal met.    3.   Patient will present with decreased limb volume in the involved extremity from mild to trace  for improved functional mobility.   Patient arrived with swelling noted in chest and through arm. Therapist complete manual lymphatic drainage to areas and reduction shown.  still unable to perform sequence and patient has been using pump however pump not working efficiently for her. Explained that when this happens she needs to call Bertin

## 2024-05-24 ENCOUNTER — HOSPITAL ENCOUNTER (OUTPATIENT)
Dept: INFUSION THERAPY | Age: 74
Discharge: HOME | End: 2024-05-24
Payer: MEDICARE

## 2024-05-24 ENCOUNTER — OFFICE VISIT (OUTPATIENT)
Dept: ONCOLOGY | Age: 74
End: 2024-05-24

## 2024-05-24 VITALS
OXYGEN SATURATION: 95 % | HEIGHT: 64 IN | DIASTOLIC BLOOD PRESSURE: 67 MMHG | HEART RATE: 84 BPM | BODY MASS INDEX: 32.78 KG/M2 | TEMPERATURE: 97.7 F | WEIGHT: 192 LBS | SYSTOLIC BLOOD PRESSURE: 138 MMHG

## 2024-05-24 DIAGNOSIS — Z85.3 PERSONAL HISTORY OF BREAST CANCER: Primary | ICD-10-CM

## 2024-05-24 DIAGNOSIS — Z85.3 PERSONAL HISTORY OF BREAST CANCER: ICD-10-CM

## 2024-05-24 LAB
ALBUMIN SERPL-MCNC: 3.8 G/DL (ref 3.5–5.2)
ALP SERPL-CCNC: 110 U/L (ref 35–104)
ALT SERPL-CCNC: 9 U/L (ref 0–32)
ANION GAP SERPL CALCULATED.3IONS-SCNC: 8 MMOL/L (ref 7–16)
AST SERPL-CCNC: 13 U/L (ref 0–31)
BASOPHILS # BLD: 0.03 K/UL (ref 0–0.2)
BASOPHILS NFR BLD: 1 % (ref 0–2)
BILIRUB SERPL-MCNC: 0.2 MG/DL (ref 0–1.2)
BUN SERPL-MCNC: 10 MG/DL (ref 6–23)
CALCIUM SERPL-MCNC: 9.1 MG/DL (ref 8.6–10.2)
CHLORIDE SERPL-SCNC: 103 MMOL/L (ref 98–107)
CO2 SERPL-SCNC: 28 MMOL/L (ref 22–29)
CREAT SERPL-MCNC: 0.8 MG/DL (ref 0.5–1)
EOSINOPHIL # BLD: 0.15 K/UL (ref 0.05–0.5)
EOSINOPHILS RELATIVE PERCENT: 3 % (ref 0–6)
ERYTHROCYTE [DISTWIDTH] IN BLOOD BY AUTOMATED COUNT: 13.3 % (ref 11.5–15)
GFR, ESTIMATED: 73 ML/MIN/1.73M2
GLUCOSE SERPL-MCNC: 154 MG/DL (ref 74–99)
HCT VFR BLD AUTO: 39.1 % (ref 34–48)
HGB BLD-MCNC: 12.5 G/DL (ref 11.5–15.5)
IMM GRANULOCYTES # BLD AUTO: <0.03 K/UL (ref 0–0.58)
IMM GRANULOCYTES NFR BLD: 0 % (ref 0–5)
LYMPHOCYTES NFR BLD: 0.79 K/UL (ref 1.5–4)
LYMPHOCYTES RELATIVE PERCENT: 16 % (ref 20–42)
MCH RBC QN AUTO: 29.6 PG (ref 26–35)
MCHC RBC AUTO-ENTMCNC: 32 G/DL (ref 32–34.5)
MCV RBC AUTO: 92.7 FL (ref 80–99.9)
MONOCYTES NFR BLD: 0.38 K/UL (ref 0.1–0.95)
MONOCYTES NFR BLD: 8 % (ref 2–12)
NEUTROPHILS NFR BLD: 72 % (ref 43–80)
NEUTS SEG NFR BLD: 3.47 K/UL (ref 1.8–7.3)
PLATELET # BLD AUTO: 188 K/UL (ref 130–450)
PMV BLD AUTO: 10 FL (ref 7–12)
POTASSIUM SERPL-SCNC: 4.2 MMOL/L (ref 3.5–5)
PROT SERPL-MCNC: 6.9 G/DL (ref 6.4–8.3)
RBC # BLD AUTO: 4.22 M/UL (ref 3.5–5.5)
SODIUM SERPL-SCNC: 139 MMOL/L (ref 132–146)
WBC OTHER # BLD: 4.8 K/UL (ref 4.5–11.5)

## 2024-05-24 PROCEDURE — 85025 COMPLETE CBC W/AUTO DIFF WBC: CPT

## 2024-05-24 PROCEDURE — 36415 COLL VENOUS BLD VENIPUNCTURE: CPT

## 2024-05-24 PROCEDURE — 80053 COMPREHEN METABOLIC PANEL: CPT

## 2024-05-24 NOTE — PROGRESS NOTES
Patient refused printed AVS, pt states they have MYCHART. All questions answered.     
participate in the care of Mrs. Shultz.    CORNELIA HAINES MD   HEMATOLOGY/MEDICAL ONCOLOGY  [facility] MED ONCOLOGY  [facility] [facility] [address]  Dept: [phone]  Loc: [phone]

## 2024-06-13 DIAGNOSIS — I89.0 LYMPHEDEMA OF BREAST: Primary | ICD-10-CM

## 2024-06-25 ENCOUNTER — TELEPHONE (OUTPATIENT)
Dept: OCCUPATIONAL THERAPY | Age: 74
End: 2024-06-25

## 2024-06-25 NOTE — TELEPHONE ENCOUNTER
Therapist received new referral for lymphedema clinic.  Therapist called patient to schedule and required to leave voice message for patient to return call at [phone]

## 2024-07-02 ENCOUNTER — TELEPHONE (OUTPATIENT)
Dept: OCCUPATIONAL THERAPY | Age: 74
End: 2024-07-02

## 2024-07-02 NOTE — TELEPHONE ENCOUNTER
Therapist received new referral for lymphedema clinic.  Therapist called clinic to schedule patient into clinic and required to leave voice message.  Therapist asked patient to return call to [facility] at [phone]

## 2024-07-17 ENCOUNTER — HOSPITAL ENCOUNTER (OUTPATIENT)
Dept: OCCUPATIONAL THERAPY | Age: 74
Setting detail: THERAPIES SERIES
Discharge: HOME | End: 2024-07-17
Payer: MEDICARE

## 2024-07-17 PROCEDURE — 97165 OT EVAL LOW COMPLEX 30 MIN: CPT | Performed by: OCCUPATIONAL THERAPIST

## 2024-07-17 NOTE — PROGRESS NOTES
Occupational Therapy      OCCUPATIONAL THERAPY INITIAL LYMPHEDEMA EVALUATION    [place] [address] [facility] [facility]  [address]   Phone: [phone]  Fax: [phone]     Date:  2024  Initial Evaluation Date: 2024     Evaluating Therapist: LEANDRO Collado/L, CLT-JAMES    Patient Name:  Dotty Shultz    :  1950    Restrictions/Precautions:   fall risk  Diagnosis:  Lymphedema of breast (I89.0)       Date of Surgery/Injury: n/a    Insurance/Certification information:  Medicare Part A and B                 [id number]  Plan of care signed (Y/N): N  Visit# / total visits: Evaluation    Referring Practitioner:  Charlene Ashford APRN-CNP                   NPI:  [id number]  Specific Practitioner Orders: Evaluation and Treatment    Assessment of current deficits   []Pain  []Skin Integrity   [x]Lymphedema   []Functional transfers/mobility   []ADLs   []Strength    []Cognition  []IADLs   []Safety Awareness   []  Motor Endurance    []Fine Motor Coordination   []Balance   []Vision/perception  []Sensation []Gross Motor Coordination  [x]ROM     OT PLAN OF CARE   OT POC based on physician orders, patient diagnosis and results of clinical assessment    Frequency/Duration:  2-3x per week for 12 treatment sessions from 2024 through 2024  Specific OT Treatment to include:     Plan of Care:     [x]97140-Manual Lymph Drainage and Combined Decongestive Therapy  [x]67452- Skilled Multilayer Short Stretch Compression Bandaging/ Therapeutic Exercise  [x]Skin Care Education [x]HEP including Self MLD Education and/or Self Bandaging  [x]Education for Lymphedema Risks/Precautions     [x] Caregiver Education   []other:      Patient Specific Goal: to get back to where I was before the swelling got worse      STG: 3   weeks  Patient will demonstrate knowledge and understanding for lymphedema precautions, skin care and self management to decrease progression of lymphedema and risk of

## 2024-07-23 ENCOUNTER — HOSPITAL ENCOUNTER (OUTPATIENT)
Dept: CT IMAGING | Age: 74
Discharge: HOME | End: 2024-07-25
Payer: MEDICARE

## 2024-07-23 DIAGNOSIS — E78.2 MIXED HYPERLIPIDEMIA: ICD-10-CM

## 2024-07-23 DIAGNOSIS — Z13.6 ENCOUNTER FOR ABDOMINAL AORTIC ANEURYSM SCREENING: ICD-10-CM

## 2024-07-23 PROCEDURE — 75571 CT HRT W/O DYE W/CA TEST: CPT

## 2024-07-26 ENCOUNTER — HOSPITAL ENCOUNTER (OUTPATIENT)
Dept: OCCUPATIONAL THERAPY | Age: 74
Setting detail: THERAPIES SERIES
Discharge: HOME | End: 2024-07-26
Payer: MEDICARE

## 2024-07-26 PROCEDURE — 97140 MANUAL THERAPY 1/> REGIONS: CPT

## 2024-07-26 PROCEDURE — 97535 SELF CARE MNGMENT TRAINING: CPT

## 2024-07-26 NOTE — PROGRESS NOTES
Occupational Therapy      OCCUPATIONAL THERAPY PROGRESS NOTE  Phone: [phone]  Fax: [phone]     Date:  2024  Initial Evaluation Date: 2023                                   Evaluating Therapist: Noah Arenas OT     Patient Name:  Dotty Shultz                   :  1950     Restrictions/Precautions:   fall risk  Diagnosis:  Lymphedema of breast (I89.0)                                                     Date of Surgery/Injury: n/a     Insurance/Certification information:  Medicare Part A and B        [id number]  Plan of care signed (Y/N): N  Visit# / total visits: Evaluation + 11 treatment ( New eval 24).     Referring Practitioner:  Charlene Ashford APRN-CNP  Specific Practitioner Orders: Evaluation and Treatment     Assessment of current deficits   []Pain  []Skin Integrity   [x]Lymphedema   []Functional transfers/mobility   []ADLs   []Strength    []Cognition  []IADLs   []Safety Awareness   []  Motor Endurance    []Fine Motor Coordination   []Balance   []Vision/perception  []Sensation []Gross Motor Coordination  []ROM      OT PLAN OF CARE   OT POC based on physician orders, patient diagnosis and results of clinical assessment     Frequency/Duration: 2-3x per week for 12 treatment sessions from 2024 through 2024   Specific OT Treatment to include:      Plan of Care:   [x]97140-Manual Lymph Drainage and Combined Decongestive Therapy  [x]43345- Skilled Multilayer Short Stretch Compression Bandaging/ Therapeutic Exercise  [x]Skin Care Education           [x]HEP including Self MLD Education and/or Self Bandaging  [x]Education for Lymphedema Risks/Precautions     [x] Caregiver Education   []other:       Patient Specific Goal: to get back to where I was before the swelling got worse        STG: 3   weeks  Patient will demonstrate knowledge and understanding for lymphedema precautions, skin care and self management to decrease progression of lymphedema and risk of

## 2024-08-30 ENCOUNTER — OFFICE VISIT (OUTPATIENT)
Dept: ONCOLOGY | Age: 74
End: 2024-08-30

## 2024-08-30 ENCOUNTER — HOSPITAL ENCOUNTER (OUTPATIENT)
Dept: INFUSION THERAPY | Age: 74
Discharge: HOME | End: 2024-08-30
Payer: MEDICARE

## 2024-08-30 VITALS
HEIGHT: 64 IN | WEIGHT: 192 LBS | SYSTOLIC BLOOD PRESSURE: 129 MMHG | TEMPERATURE: 97.4 F | OXYGEN SATURATION: 96 % | DIASTOLIC BLOOD PRESSURE: 62 MMHG | BODY MASS INDEX: 32.78 KG/M2 | HEART RATE: 66 BPM

## 2024-08-30 DIAGNOSIS — Z85.3 PERSONAL HISTORY OF BREAST CANCER: ICD-10-CM

## 2024-08-30 DIAGNOSIS — R21 SKIN RASH: ICD-10-CM

## 2024-08-30 DIAGNOSIS — Z85.3 PERSONAL HISTORY OF BREAST CANCER: Primary | ICD-10-CM

## 2024-08-30 LAB
ALBUMIN SERPL-MCNC: 4.1 G/DL (ref 3.5–5.2)
ALP SERPL-CCNC: 98 U/L (ref 35–104)
ALT SERPL-CCNC: 9 U/L (ref 0–32)
ANION GAP SERPL CALCULATED.3IONS-SCNC: 10 MMOL/L (ref 7–16)
AST SERPL-CCNC: 14 U/L (ref 0–31)
BASOPHILS # BLD: 0.04 K/UL (ref 0–0.2)
BASOPHILS NFR BLD: 1 % (ref 0–2)
BILIRUB SERPL-MCNC: 0.5 MG/DL (ref 0–1.2)
BUN SERPL-MCNC: 12 MG/DL (ref 6–23)
CALCIUM SERPL-MCNC: 9.6 MG/DL (ref 8.6–10.2)
CHLORIDE SERPL-SCNC: 103 MMOL/L (ref 98–107)
CO2 SERPL-SCNC: 28 MMOL/L (ref 22–29)
CREAT SERPL-MCNC: 0.8 MG/DL (ref 0.5–1)
EOSINOPHIL # BLD: 0.18 K/UL (ref 0.05–0.5)
EOSINOPHILS RELATIVE PERCENT: 4 % (ref 0–6)
ERYTHROCYTE [DISTWIDTH] IN BLOOD BY AUTOMATED COUNT: 13.3 % (ref 11.5–15)
GFR, ESTIMATED: 72 ML/MIN/1.73M2
GLUCOSE SERPL-MCNC: 113 MG/DL (ref 74–99)
HCT VFR BLD AUTO: 40.4 % (ref 34–48)
HGB BLD-MCNC: 13.3 G/DL (ref 11.5–15.5)
IMM GRANULOCYTES # BLD AUTO: <0.03 K/UL (ref 0–0.58)
IMM GRANULOCYTES NFR BLD: 0 % (ref 0–5)
LYMPHOCYTES NFR BLD: 1.02 K/UL (ref 1.5–4)
LYMPHOCYTES RELATIVE PERCENT: 21 % (ref 20–42)
MCH RBC QN AUTO: 30.6 PG (ref 26–35)
MCHC RBC AUTO-ENTMCNC: 32.9 G/DL (ref 32–34.5)
MCV RBC AUTO: 93.1 FL (ref 80–99.9)
MONOCYTES NFR BLD: 0.44 K/UL (ref 0.1–0.95)
MONOCYTES NFR BLD: 9 % (ref 2–12)
NEUTROPHILS NFR BLD: 65 % (ref 43–80)
NEUTS SEG NFR BLD: 3.21 K/UL (ref 1.8–7.3)
PLATELET # BLD AUTO: 186 K/UL (ref 130–450)
PMV BLD AUTO: 9.8 FL (ref 7–12)
POTASSIUM SERPL-SCNC: 4.6 MMOL/L (ref 3.5–5)
PROT SERPL-MCNC: 7.4 G/DL (ref 6.4–8.3)
RBC # BLD AUTO: 4.34 M/UL (ref 3.5–5.5)
SODIUM SERPL-SCNC: 141 MMOL/L (ref 132–146)
WBC OTHER # BLD: 4.9 K/UL (ref 4.5–11.5)

## 2024-08-30 PROCEDURE — 85025 COMPLETE CBC W/AUTO DIFF WBC: CPT

## 2024-08-30 PROCEDURE — 80053 COMPREHEN METABOLIC PANEL: CPT

## 2024-08-30 PROCEDURE — 36415 COLL VENOUS BLD VENIPUNCTURE: CPT

## 2024-08-30 NOTE — PROGRESS NOTES
[facility] PHYSICIANS [facility] MED ONCOLOGY  [address] [facility] [address]  Dept: [phone]  Loc: [phone]  Attending progress note      Reason for Visit:   Left breast cancer.    Referring Physician: Dr. Weber.    PCP:  Clare Poon PA-C    History of Present Illness:     Mrs. Shultz is a 74-year-old lady, with a past medical history significant for anxiety, hypothyroidism, and peripheral neuropathy, had presented with an abnormal screening mammogram from 5/24/2022, it had revealed new 3 6 mm mass in the upper outer left breast and 19 mm lymph node in the left axilla, on 5/27/2022 she underwent an ultrasound-guided left breast mass core biopsy, pathology had revealed invasive poorly differentiated carcinoma, grade 3, ER +80% TX +90% HER2/alex 2+ by IHC, positive by FISH, the patient had on 6/20/2022 CT scans of the chest, abdomen and pelvis done, revealing enlarged left axillary lymph node measuring just under 2 cm, no evidence of distant metastatic disease, she had a 2D echocardiogram done on 8/28/2022, revealing normal LVEF at 70%, patient was under the care of Dr. Weber, she received the first cycle of TCHP on 8/22/2022, the course was complicated by C. difficile infection which was diagnosed on 9/8/2022.  She completed the course of oral vancomycin.  The patient had just started to feel rare and like herself again, the diarrhea had resolved.  The breast mass had decreased in size.    The patient received Zinplava on 10/31/2022.  On 11/1/2022, the patient received the second cycle of chemotherapy, TCH, the patient was having brain fog after the chemotherapy, brain MRI was done on 11/25/2022, revealing diffuse wall thickening and FLAIR hyperintensity of the dura, with progression, differential diagnoses include but are not limited to intracranial hypertension, meningitis, neurosarcoidosis, lymphoma, hypertrophic pachymeningitis, meningeal metastasis and

## 2024-09-24 ENCOUNTER — HOSPITAL ENCOUNTER (OUTPATIENT)
Dept: RADIATION ONCOLOGY | Age: 74
Discharge: HOME | End: 2024-09-24
Payer: MEDICARE

## 2024-09-24 VITALS
SYSTOLIC BLOOD PRESSURE: 134 MMHG | DIASTOLIC BLOOD PRESSURE: 67 MMHG | TEMPERATURE: 96.9 F | BODY MASS INDEX: 33.45 KG/M2 | HEART RATE: 74 BPM | OXYGEN SATURATION: 95 % | RESPIRATION RATE: 18 BRPM | WEIGHT: 194.9 LBS

## 2024-09-24 DIAGNOSIS — C50.412 MALIGNANT NEOPLASM OF UPPER-OUTER QUADRANT OF LEFT BREAST IN FEMALE, ESTROGEN RECEPTOR POSITIVE (HCC): Primary | ICD-10-CM

## 2024-09-24 DIAGNOSIS — Z92.3 S/P RADIATION THERAPY > 12 WKS AGO: ICD-10-CM

## 2024-09-24 DIAGNOSIS — Z17.0 MALIGNANT NEOPLASM OF UPPER-OUTER QUADRANT OF LEFT BREAST IN FEMALE, ESTROGEN RECEPTOR POSITIVE (HCC): Primary | ICD-10-CM

## 2024-09-24 PROCEDURE — 99213 OFFICE O/P EST LOW 20 MIN: CPT | Performed by: NURSE PRACTITIONER

## 2024-09-24 PROCEDURE — 99213 OFFICE O/P EST LOW 20 MIN: CPT

## 2024-09-27 ENCOUNTER — HOSPITAL ENCOUNTER (OUTPATIENT)
Dept: OCCUPATIONAL THERAPY | Age: 74
Setting detail: THERAPIES SERIES
Discharge: HOME | End: 2024-09-27

## 2024-09-27 NOTE — DISCHARGE SUMMARY
OCCUPATIONAL THERAPY DISCHARGE NOTE  [place] [address] [address] [facility]  [address]              Phone: [phone]             Fax: [phone]     Date:  2024  Initial Evaluation Date: 2024     Evaluating Therapist: DARLIN Collado CLT-LANA    Patient Name:  Dotty Shultz    :  1950    Restrictions/Precautions:   fall risk  Diagnosis:  Lymphedema of breast (I89.0)        Date of Surgery/Injury: n/a    Insurance/Certification information:  Medicare Part A and B [id number]   Plan of care signed (Y/N): N  Visit#:  1   Total Visits to date:  Evaluation +Visit #1   Current update duration from 2024 to 2024  Cancels/No-shows to date: 0  Last seen by therapist:  2024  Patient reaction to treatment:  Patient did not reschedule appointments for clinic and now discharged at this time  Garment / DME recommended:  compression bra / sleeve, lymphedema pump continuation    Referring Practitioner:  Charlene Ashford APRN-CNP   Specific Practitioner Orders: evaluation and treatment    Assessment of current deficits   []Pain  []Skin Integrity   [x]Lymphedema   []Functional transfers/mobility   []ADLs   []Strength    []Cognition  []IADLs   []Safety Awareness   []  Motor Endurance    []Fine Motor Coordination   []Balance   []Vision/perception  []Sensation []Gross Motor Coordination  [x]ROM     OT PLAN OF CARE   OT POC based on physician orders, patient diagnosis and results of clinical assessment    Frequency/Duration:  patient now discharged  Specific OT Treatment to include:     Plan of Care:     [x]97140-Manual Lymph Drainage and Combined Decongestive Therapy  [x]41787- Skilled Multilayer Short Stretch Compression Bandaging/ Therapeutic Exercise  [x]Skin Care Education [x]HEP including Self MLD Education and/or Self Bandaging  [x]Education for Lymphedema Risks/Precautions     [x] Caregiver Education   []other:      Patient Specific Goal: to get back to

## 2024-10-04 ENCOUNTER — HOSPITAL ENCOUNTER (OUTPATIENT)
Dept: INFUSION THERAPY | Age: 74
Discharge: HOME | End: 2024-10-04
Payer: MEDICARE

## 2024-10-04 ENCOUNTER — OFFICE VISIT (OUTPATIENT)
Dept: ONCOLOGY | Age: 74
End: 2024-10-04

## 2024-10-04 VITALS
HEART RATE: 66 BPM | OXYGEN SATURATION: 100 % | WEIGHT: 192.8 LBS | DIASTOLIC BLOOD PRESSURE: 70 MMHG | BODY MASS INDEX: 32.91 KG/M2 | HEIGHT: 64 IN | TEMPERATURE: 97.3 F | SYSTOLIC BLOOD PRESSURE: 132 MMHG

## 2024-10-04 DIAGNOSIS — Z85.3 PERSONAL HISTORY OF BREAST CANCER: Primary | ICD-10-CM

## 2024-10-04 DIAGNOSIS — R21 SKIN RASH: ICD-10-CM

## 2024-10-04 DIAGNOSIS — Z85.3 PERSONAL HISTORY OF BREAST CANCER: ICD-10-CM

## 2024-10-04 LAB
ALBUMIN SERPL-MCNC: 4 G/DL (ref 3.5–5.2)
ALP SERPL-CCNC: 97 U/L (ref 35–104)
ALT SERPL-CCNC: 10 U/L (ref 0–32)
ANION GAP SERPL CALCULATED.3IONS-SCNC: 16 MMOL/L (ref 7–16)
AST SERPL-CCNC: 15 U/L (ref 0–31)
BASOPHILS # BLD: 0.05 K/UL (ref 0–0.2)
BASOPHILS NFR BLD: 1 % (ref 0–2)
BILIRUB SERPL-MCNC: 0.5 MG/DL (ref 0–1.2)
BUN SERPL-MCNC: 11 MG/DL (ref 6–23)
CALCIUM SERPL-MCNC: 9.7 MG/DL (ref 8.6–10.2)
CHLORIDE SERPL-SCNC: 101 MMOL/L (ref 98–107)
CO2 SERPL-SCNC: 24 MMOL/L (ref 22–29)
CREAT SERPL-MCNC: 0.8 MG/DL (ref 0.5–1)
EOSINOPHIL # BLD: 0.21 K/UL (ref 0.05–0.5)
EOSINOPHILS RELATIVE PERCENT: 4 % (ref 0–6)
ERYTHROCYTE [DISTWIDTH] IN BLOOD BY AUTOMATED COUNT: 13.5 % (ref 11.5–15)
GFR, ESTIMATED: 72 ML/MIN/1.73M2
GLUCOSE SERPL-MCNC: 125 MG/DL (ref 74–99)
HCT VFR BLD AUTO: 40.8 % (ref 34–48)
HGB BLD-MCNC: 13.3 G/DL (ref 11.5–15.5)
IMM GRANULOCYTES # BLD AUTO: 0.03 K/UL (ref 0–0.58)
IMM GRANULOCYTES NFR BLD: 1 % (ref 0–5)
LYMPHOCYTES NFR BLD: 0.99 K/UL (ref 1.5–4)
LYMPHOCYTES RELATIVE PERCENT: 18 % (ref 20–42)
MCH RBC QN AUTO: 30 PG (ref 26–35)
MCHC RBC AUTO-ENTMCNC: 32.6 G/DL (ref 32–34.5)
MCV RBC AUTO: 91.9 FL (ref 80–99.9)
MONOCYTES NFR BLD: 0.56 K/UL (ref 0.1–0.95)
MONOCYTES NFR BLD: 10 % (ref 2–12)
NEUTROPHILS NFR BLD: 67 % (ref 43–80)
NEUTS SEG NFR BLD: 3.66 K/UL (ref 1.8–7.3)
PLATELET # BLD AUTO: 182 K/UL (ref 130–450)
PMV BLD AUTO: 9.7 FL (ref 7–12)
POTASSIUM SERPL-SCNC: 4.6 MMOL/L (ref 3.5–5)
PROT SERPL-MCNC: 7.2 G/DL (ref 6.4–8.3)
RBC # BLD AUTO: 4.44 M/UL (ref 3.5–5.5)
SODIUM SERPL-SCNC: 141 MMOL/L (ref 132–146)
WBC OTHER # BLD: 5.5 K/UL (ref 4.5–11.5)

## 2024-10-04 PROCEDURE — 85025 COMPLETE CBC W/AUTO DIFF WBC: CPT

## 2024-10-04 PROCEDURE — 36415 COLL VENOUS BLD VENIPUNCTURE: CPT

## 2024-10-04 PROCEDURE — 80053 COMPREHEN METABOLIC PANEL: CPT

## 2024-10-04 RX ORDER — KETOCONAZOLE 20 MG/G
CREAM TOPICAL DAILY
COMMUNITY

## 2024-10-04 NOTE — PROGRESS NOTES
[facility] PHYSICIANS [facility] MED ONCOLOGY  [address] [facility] [address]  Dept: [phone]  Loc: [phone]  Attending progress note      Reason for Visit:   Left breast cancer.    Referring Physician: Dr. Weber.    PCP:  Clare Poon PA-C    History of Present Illness:     Mrs. Shultz is a 74-year-old lady, with a past medical history significant for anxiety, hypothyroidism, and peripheral neuropathy, had presented with an abnormal screening mammogram from 5/24/2022, it had revealed new 3 6 mm mass in the upper outer left breast and 19 mm lymph node in the left axilla, on 5/27/2022 she underwent an ultrasound-guided left breast mass core biopsy, pathology had revealed invasive poorly differentiated carcinoma, grade 3, ER +80% OR +90% HER2/alex 2+ by IHC, positive by FISH, the patient had on 6/20/2022 CT scans of the chest, abdomen and pelvis done, revealing enlarged left axillary lymph node measuring just under 2 cm, no evidence of distant metastatic disease, she had a 2D echocardiogram done on 8/28/2022, revealing normal LVEF at 70%, patient was under the care of Dr. Weber, she received the first cycle of TCHP on 8/22/2022, the course was complicated by C. difficile infection which was diagnosed on 9/8/2022.  She completed the course of oral vancomycin.  The patient had just started to feel rare and like herself again, the diarrhea had resolved.  The breast mass had decreased in size.    The patient received Zinplava on 10/31/2022.  On 11/1/2022, the patient received the second cycle of chemotherapy, TCH, the patient was having brain fog after the chemotherapy, brain MRI was done on 11/25/2022, revealing diffuse wall thickening and FLAIR hyperintensity of the dura, with progression, differential diagnoses include but are not limited to intracranial hypertension, meningitis, neurosarcoidosis, lymphoma, hypertrophic pachymeningitis, meningeal metastasis and

## 2024-10-07 ENCOUNTER — TELEPHONE (OUTPATIENT)
Dept: INFUSION THERAPY | Age: 74
End: 2024-10-07

## 2024-10-07 NOTE — TELEPHONE ENCOUNTER
This nurse called [facility], spoke to Elvira,  for patient's records from 9/26/24. Records faxed and scanned into chart

## 2024-12-01 NOTE — PATIENT INSTRUCTIONS
Continue daily fractionated radiation therapy as scheduled. Please see weekly OTV note and intial consultation letter in [facility] for clinical details. Ellen Darling. Nay Arguello MD MS [address]:  [phone]   FAX: [phone] [address]:  [phone]   FAX:    [phone] [address]:  [phone]   FAX:  [phone]  Email: [email or web address]
Home

## 2025-02-07 ENCOUNTER — OFFICE VISIT (OUTPATIENT)
Dept: ONCOLOGY | Age: 75
End: 2025-02-07
Payer: MEDICARE

## 2025-02-07 ENCOUNTER — HOSPITAL ENCOUNTER (OUTPATIENT)
Dept: INFUSION THERAPY | Age: 75
Discharge: HOME | End: 2025-02-07
Payer: MEDICARE

## 2025-02-07 VITALS
DIASTOLIC BLOOD PRESSURE: 95 MMHG | HEIGHT: 64 IN | TEMPERATURE: 98.1 F | SYSTOLIC BLOOD PRESSURE: 123 MMHG | BODY MASS INDEX: 33.05 KG/M2 | OXYGEN SATURATION: 95 % | WEIGHT: 193.6 LBS | HEART RATE: 80 BPM

## 2025-02-07 DIAGNOSIS — Z12.31 ENCOUNTER FOR SCREENING MAMMOGRAM FOR MALIGNANT NEOPLASM OF BREAST: ICD-10-CM

## 2025-02-07 DIAGNOSIS — Z85.3 PERSONAL HISTORY OF BREAST CANCER: Primary | ICD-10-CM

## 2025-02-07 DIAGNOSIS — Z85.3 PERSONAL HISTORY OF BREAST CANCER: ICD-10-CM

## 2025-02-07 LAB
ALBUMIN SERPL-MCNC: 4.3 G/DL (ref 3.5–5.2)
ALP SERPL-CCNC: 111 U/L (ref 35–104)
ALT SERPL-CCNC: 11 U/L (ref 0–32)
ANION GAP SERPL CALCULATED.3IONS-SCNC: 10 MMOL/L (ref 7–16)
AST SERPL-CCNC: 15 U/L (ref 0–31)
BASOPHILS # BLD: 0.04 K/UL (ref 0–0.2)
BASOPHILS NFR BLD: 1 % (ref 0–2)
BILIRUB SERPL-MCNC: 0.4 MG/DL (ref 0–1.2)
BUN SERPL-MCNC: 12 MG/DL (ref 6–23)
CALCIUM SERPL-MCNC: 9.4 MG/DL (ref 8.6–10.2)
CHLORIDE SERPL-SCNC: 103 MMOL/L (ref 98–107)
CO2 SERPL-SCNC: 28 MMOL/L (ref 22–29)
CREAT SERPL-MCNC: 0.8 MG/DL (ref 0.5–1)
EOSINOPHIL # BLD: 0.3 K/UL (ref 0.05–0.5)
EOSINOPHILS RELATIVE PERCENT: 4 % (ref 0–6)
ERYTHROCYTE [DISTWIDTH] IN BLOOD BY AUTOMATED COUNT: 13.1 % (ref 11.5–15)
GFR, ESTIMATED: 73 ML/MIN/1.73M2
GLUCOSE SERPL-MCNC: 150 MG/DL (ref 74–99)
HCT VFR BLD AUTO: 41.2 % (ref 34–48)
HGB BLD-MCNC: 13.6 G/DL (ref 11.5–15.5)
IMM GRANULOCYTES # BLD AUTO: 0.04 K/UL (ref 0–0.58)
IMM GRANULOCYTES NFR BLD: 1 % (ref 0–5)
LYMPHOCYTES NFR BLD: 0.97 K/UL (ref 1.5–4)
LYMPHOCYTES RELATIVE PERCENT: 14 % (ref 20–42)
MCH RBC QN AUTO: 30.4 PG (ref 26–35)
MCHC RBC AUTO-ENTMCNC: 33 G/DL (ref 32–34.5)
MCV RBC AUTO: 92 FL (ref 80–99.9)
MONOCYTES NFR BLD: 0.56 K/UL (ref 0.1–0.95)
MONOCYTES NFR BLD: 8 % (ref 2–12)
NEUTROPHILS NFR BLD: 72 % (ref 43–80)
NEUTS SEG NFR BLD: 4.88 K/UL (ref 1.8–7.3)
PLATELET # BLD AUTO: 192 K/UL (ref 130–450)
PMV BLD AUTO: 9.7 FL (ref 7–12)
POTASSIUM SERPL-SCNC: 4.8 MMOL/L (ref 3.5–5)
PROT SERPL-MCNC: 7.2 G/DL (ref 6.4–8.3)
RBC # BLD AUTO: 4.48 M/UL (ref 3.5–5.5)
SODIUM SERPL-SCNC: 141 MMOL/L (ref 132–146)
WBC OTHER # BLD: 6.8 K/UL (ref 4.5–11.5)

## 2025-02-07 PROCEDURE — G8399 PT W/DXA RESULTS DOCUMENT: HCPCS | Performed by: INTERNAL MEDICINE

## 2025-02-07 PROCEDURE — 1036F TOBACCO NON-USER: CPT | Performed by: INTERNAL MEDICINE

## 2025-02-07 PROCEDURE — 1123F ACP DISCUSS/DSCN MKR DOCD: CPT | Performed by: INTERNAL MEDICINE

## 2025-02-07 PROCEDURE — 36415 COLL VENOUS BLD VENIPUNCTURE: CPT

## 2025-02-07 PROCEDURE — 1160F RVW MEDS BY RX/DR IN RCRD: CPT | Performed by: INTERNAL MEDICINE

## 2025-02-07 PROCEDURE — 99213 OFFICE O/P EST LOW 20 MIN: CPT

## 2025-02-07 PROCEDURE — G8427 DOCREV CUR MEDS BY ELIG CLIN: HCPCS | Performed by: INTERNAL MEDICINE

## 2025-02-07 PROCEDURE — 85025 COMPLETE CBC W/AUTO DIFF WBC: CPT

## 2025-02-07 PROCEDURE — G8417 CALC BMI ABV UP PARAM F/U: HCPCS | Performed by: INTERNAL MEDICINE

## 2025-02-07 PROCEDURE — 1159F MED LIST DOCD IN RCRD: CPT | Performed by: INTERNAL MEDICINE

## 2025-02-07 PROCEDURE — 80053 COMPREHEN METABOLIC PANEL: CPT

## 2025-02-07 PROCEDURE — 1090F PRES/ABSN URINE INCON ASSESS: CPT | Performed by: INTERNAL MEDICINE

## 2025-02-07 PROCEDURE — 99214 OFFICE O/P EST MOD 30 MIN: CPT | Performed by: INTERNAL MEDICINE

## 2025-02-07 NOTE — PROGRESS NOTES
Patient provided with discharge instructions, received printed AVS.  All questions answered.  Patient understands follow up plan of care.   
discussed that if she would like to start the chemotherapy, would at least consider HP.  The patient does not want Perjeta due to diarrhea/ C diff that she had with the first treatment, I asked her to think about it, the patient did not want to receive her treatment on 3/21/2023, she had decided against continuing Perjeta, she was to receive Herceptin alone, she has requested to delay the treatment until after Easter.    She received single agent Herceptin on 4/18/2023.      The patient wanted to hold the Herceptin's patient for surgery.    She underwent on 5/22/2023 a left breast lumpectomy with sentinel lymph node excisional biopsy, pathology:    CANCER CASE SUMMARY   Procedure: Excision   Specimen Laterality: Left   Histologic Type of Invasive Carcinoma: Invasive carcinoma of no special   type (ductal)   Jus Histologic Score: 3+2+3 = 8   Overall grade: Grade 3   Tumor Size: Size of Largest Invasive Carcinoma: 3.2 x 1.5 x 1.3 cm   Ductal Carcinoma In Situ (DCIS): Not identified   Tumor Extent: Not applicable (skin, nipple, and skeletal muscle are   absent OR are uninvolved)   Lymphovascular invasion: Present, focal   Treatment effect in the breast: Probable or definite response to   presurgical therapy in the invasive carcinoma   Treatment effect in the lymph nodes: No definite response to presurgical   therapy in metastatic carcinoma   Margins:       Invasive carcinoma margins: All margins negative for invasive   carcinoma             Distance from nearest margin: At least 1.5 cm       DCIS margins: Not applicable (no DCIS in specimen)   Regional lymph Nodes: Tumor present in regional lymph nodes       Total number of lymph nodes examined: 7       Number sentinel lymph nodes examined: 1       Number of lymph nodes with macrometastasis (>2 mm): 1       Number of lymph nodes with micrometastasis: 0       Number of lymph nodes with isolated tumor cells: 0       Size of largest metastatic deposit: 5 mm

## 2025-02-10 ENCOUNTER — TELEPHONE (OUTPATIENT)
Dept: OCCUPATIONAL THERAPY | Age: 75
End: 2025-02-10

## 2025-02-10 NOTE — TELEPHONE ENCOUNTER
Therapist received order for lymphedema clinic.  Therapist called patient to schedule and required to leave voice message for patient to return call to [facility] at [phone]

## 2025-02-28 ENCOUNTER — HOSPITAL ENCOUNTER (OUTPATIENT)
Dept: OCCUPATIONAL THERAPY | Age: 75
Setting detail: THERAPIES SERIES
Discharge: HOME | End: 2025-02-28
Payer: MEDICARE

## 2025-02-28 PROCEDURE — 97165 OT EVAL LOW COMPLEX 30 MIN: CPT | Performed by: OCCUPATIONAL THERAPIST

## 2025-02-28 NOTE — PROGRESS NOTES
Physicians's Printed Name:  Jaelyn Devi MD                                    Physician's Signature:                                                               Date:      Please review Patient's OT evaluation and if you agree sign/date and fax back to us at our [facility] MAIN OT fax [phone]. Thank you for your referral!

## 2025-03-04 ENCOUNTER — HOSPITAL ENCOUNTER (OUTPATIENT)
Dept: OCCUPATIONAL THERAPY | Age: 75
Setting detail: THERAPIES SERIES
Discharge: HOME | End: 2025-03-04
Payer: MEDICARE

## 2025-03-04 PROCEDURE — 97535 SELF CARE MNGMENT TRAINING: CPT

## 2025-03-04 PROCEDURE — 97140 MANUAL THERAPY 1/> REGIONS: CPT

## 2025-03-04 NOTE — PROGRESS NOTES
78466 OT Eval Medium     01073 OT Eval High     22614 Fluidotherapy     89432 Manual 46 3   64712 Therapeutic Ex     13194 Therapeutic Activity     11375 ADL/COMP Tech Train 14 1   42920 Neuromuscular Re-Ed     24830 OrthoManagementTraining     85324 Paraffin     21728 Electrical Stim - Attended     07362 Iontophoresis     60062 Ultrasound      Other             Plan: Continue to address:  []Bandaging  [] Self Bandaging/Family Assist  [x]MLD  [x]Self Massage  [x]Exercise  [x]Education  [x]Obtain referral for garments  []Medical Hold  []Discharge to Home Program  Maricruz ROJAS, CLT  [id number]

## 2025-03-06 ENCOUNTER — HOSPITAL ENCOUNTER (OUTPATIENT)
Dept: OCCUPATIONAL THERAPY | Age: 75
Setting detail: THERAPIES SERIES
Discharge: HOME | End: 2025-03-06
Payer: MEDICARE

## 2025-03-06 PROCEDURE — 97535 SELF CARE MNGMENT TRAINING: CPT

## 2025-03-06 PROCEDURE — 97140 MANUAL THERAPY 1/> REGIONS: CPT

## 2025-03-06 NOTE — PROGRESS NOTES
Occupational Therapy      OCCUPATIONAL THERAPY DAILY TREATMENT NOTE  Phone: [phone]  Fax: [phone]     Date:  3/6/2025  Initial Evaluation Date: 2025                                     Evaluating Therapist: LEANDRO Collado/L, CLT-JAMES     Patient Name:  Dotty Shultz                   :  1950     Restrictions/Precautions:   fall risk  Diagnosis:  Personal history of breast cancer (Z85.3)                                                           Date of Surgery/Injury: n/a     Insurance/Certification information:  Medicare Part A and B                   [id number]  Plan of care signed (Y/N): N  Visit# / total visits: Evaluation +2 treatment     Referring Practitioner:  Jaelyn Devi MD                                 NPI:  [id number]  Specific Practitioner Orders: Evaluation and Treatment     Assessment of current deficits   []Pain  []Skin Integrity   [x]Lymphedema   []Functional transfers/mobility   []ADLs   []Strength    []Cognition  []IADLs   []Safety Awareness   []  Motor Endurance    []Fine Motor Coordination   []Balance   []Vision/perception  []Sensation []Gross Motor Coordination  [x]ROM      OT PLAN OF CARE   OT POC based on physician orders, patient diagnosis and results of clinical assessment     Frequency/Duration:  1-2x per week for 12 treatment sessions from 2025 through 2025  Specific OT Treatment to include:      Plan of Care:      [x]97140-Manual Lymph Drainage and Combined Decongestive Therapy  [x]27551- Skilled Multilayer Short Stretch Compression Bandaging/ Therapeutic Exercise  [x]Skin Care Education           [x]HEP including Self MLD Education and/or Self Bandaging  [x]Education for Lymphedema Risks/Precautions     [x] Caregiver Education   []other:        Patient Specific Goal: for this to stop hurting; for swelling to go down     STG: 3 weeks   Patient will demonstrate knowledge and understanding for lymphedema precautions, skin care and self

## 2025-03-11 ENCOUNTER — HOSPITAL ENCOUNTER (OUTPATIENT)
Dept: RADIATION ONCOLOGY | Age: 75
Discharge: HOME | End: 2025-03-11
Payer: MEDICARE

## 2025-03-11 ENCOUNTER — HOSPITAL ENCOUNTER (OUTPATIENT)
Dept: OCCUPATIONAL THERAPY | Age: 75
Setting detail: THERAPIES SERIES
Discharge: HOME | End: 2025-03-11
Payer: MEDICARE

## 2025-03-11 VITALS
OXYGEN SATURATION: 98 % | SYSTOLIC BLOOD PRESSURE: 112 MMHG | DIASTOLIC BLOOD PRESSURE: 71 MMHG | WEIGHT: 190.2 LBS | RESPIRATION RATE: 18 BRPM | HEART RATE: 73 BPM | TEMPERATURE: 97.4 F | BODY MASS INDEX: 32.65 KG/M2

## 2025-03-11 DIAGNOSIS — C50.412 MALIGNANT NEOPLASM OF UPPER-OUTER QUADRANT OF LEFT BREAST IN FEMALE, ESTROGEN RECEPTOR POSITIVE (HCC): Primary | ICD-10-CM

## 2025-03-11 DIAGNOSIS — Z17.0 MALIGNANT NEOPLASM OF UPPER-OUTER QUADRANT OF LEFT BREAST IN FEMALE, ESTROGEN RECEPTOR POSITIVE (HCC): Primary | ICD-10-CM

## 2025-03-11 DIAGNOSIS — Z92.3 S/P RADIATION THERAPY > 12 WKS AGO: ICD-10-CM

## 2025-03-11 PROCEDURE — 97535 SELF CARE MNGMENT TRAINING: CPT

## 2025-03-11 PROCEDURE — 99213 OFFICE O/P EST LOW 20 MIN: CPT

## 2025-03-11 PROCEDURE — 97140 MANUAL THERAPY 1/> REGIONS: CPT
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
## 2025-03-11 NOTE — PROGRESS NOTES
Occupational Therapy      OCCUPATIONAL THERAPY DAILY TREATMENT NOTE  Phone: [phone]  Fax: [phone]     Date:  3/11/2025  Initial Evaluation Date: 2025                                     Evaluating Therapist: LEANDRO Collado/L, CLT-JAMES     Patient Name:  Dotty Shultz                   :  1950     Restrictions/Precautions:   fall risk  Diagnosis:  Personal history of breast cancer (Z85.3)                                                           Date of Surgery/Injury: n/a     Insurance/Certification information:  Medicare Part A and B                   [id number]  Plan of care signed (Y/N): N  Visit# / total visits: Evaluation +3 treatment     Referring Practitioner:  Jaelyn Devi MD                                 NPI:  [id number]  Specific Practitioner Orders: Evaluation and Treatment     Assessment of current deficits   []Pain  []Skin Integrity   [x]Lymphedema   []Functional transfers/mobility   []ADLs   []Strength    []Cognition  []IADLs   []Safety Awareness   []  Motor Endurance    []Fine Motor Coordination   []Balance   []Vision/perception  []Sensation []Gross Motor Coordination  [x]ROM      OT PLAN OF CARE   OT POC based on physician orders, patient diagnosis and results of clinical assessment     Frequency/Duration:  1-2x per week for 12 treatment sessions from 2025 through 2025  Specific OT Treatment to include:      Plan of Care:      [x]97140-Manual Lymph Drainage and Combined Decongestive Therapy  [x]69096- Skilled Multilayer Short Stretch Compression Bandaging/ Therapeutic Exercise  [x]Skin Care Education           [x]HEP including Self MLD Education and/or Self Bandaging  [x]Education for Lymphedema Risks/Precautions     [x] Caregiver Education   []other:        Patient Specific Goal: for this to stop hurting; for swelling to go down     STG: 3 weeks   Patient will demonstrate knowledge and understanding for lymphedema precautions, skin care and self

## 2025-03-12 RX ORDER — FLUCONAZOLE 150 MG/1
150 TABLET ORAL DAILY
COMMUNITY
Start: 2025-03-03

## 2025-03-13 ENCOUNTER — HOSPITAL ENCOUNTER (OUTPATIENT)
Dept: OCCUPATIONAL THERAPY | Age: 75
Setting detail: THERAPIES SERIES
Discharge: HOME | End: 2025-03-13
Payer: MEDICARE

## 2025-03-13 PROCEDURE — 97535 SELF CARE MNGMENT TRAINING: CPT

## 2025-03-13 PROCEDURE — 97140 MANUAL THERAPY 1/> REGIONS: CPT

## 2025-03-13 NOTE — PROGRESS NOTES
Occupational Therapy      OCCUPATIONAL THERAPY DAILY TREATMENT NOTE  Phone: [phone]  Fax: [phone]     Date:  3/13/2025  Initial Evaluation Date: 2025                                     Evaluating Therapist: LEANDRO Collado/L, CLT-JAMES     Patient Name:  Dotty Shultz                   :  1950     Restrictions/Precautions:   fall risk  Diagnosis:  Personal history of breast cancer (Z85.3)                                                           Date of Surgery/Injury: n/a     Insurance/Certification information:  Medicare Part A and B                   [id number]  Plan of care signed (Y/N): N  Visit# / total visits: Evaluation +4 treatment     Referring Practitioner:  Jaelyn Devi MD                                 NPI:  [id number]  Specific Practitioner Orders: Evaluation and Treatment     Assessment of current deficits   []Pain  []Skin Integrity   [x]Lymphedema   []Functional transfers/mobility   []ADLs   []Strength    []Cognition  []IADLs   []Safety Awareness   []  Motor Endurance    []Fine Motor Coordination   []Balance   []Vision/perception  []Sensation []Gross Motor Coordination  [x]ROM      OT PLAN OF CARE   OT POC based on physician orders, patient diagnosis and results of clinical assessment     Frequency/Duration:  1-2x per week for 12 treatment sessions from 2025 through 2025  Specific OT Treatment to include:      Plan of Care:      [x]97140-Manual Lymph Drainage and Combined Decongestive Therapy  [x]90348- Skilled Multilayer Short Stretch Compression Bandaging/ Therapeutic Exercise  [x]Skin Care Education           [x]HEP including Self MLD Education and/or Self Bandaging  [x]Education for Lymphedema Risks/Precautions     [x] Caregiver Education   []other:        Patient Specific Goal: for this to stop hurting; for swelling to go down     STG: 3 weeks   Patient will demonstrate knowledge and understanding for lymphedema precautions, skin care and self

## 2025-03-18 ENCOUNTER — HOSPITAL ENCOUNTER (OUTPATIENT)
Dept: OCCUPATIONAL THERAPY | Age: 75
Setting detail: THERAPIES SERIES
Discharge: HOME | End: 2025-03-18
Payer: MEDICARE

## 2025-03-18 PROCEDURE — 97535 SELF CARE MNGMENT TRAINING: CPT

## 2025-03-18 PROCEDURE — 97140 MANUAL THERAPY 1/> REGIONS: CPT

## 2025-03-18 NOTE — PROGRESS NOTES
Occupational Therapy      OCCUPATIONAL THERAPY DAILY TREATMENT NOTE  Phone: [phone]  Fax: [phone]     Date:  3/18/2025  Initial Evaluation Date: 2025                                     Evaluating Therapist: LEANDRO Collado/L, CLT-JAMES     Patient Name:  Dotty Shultz                   :  1950     Restrictions/Precautions:   fall risk  Diagnosis:  Personal history of breast cancer (Z85.3)                                                           Date of Surgery/Injury: n/a     Insurance/Certification information:  Medicare Part A and B                   [id number]  Plan of care signed (Y/N): N  Visit# / total visits: Evaluation +5 treatment     Referring Practitioner:  Jaelyn Devi MD                                 NPI:  [id number]  Specific Practitioner Orders: Evaluation and Treatment     Assessment of current deficits   []Pain  []Skin Integrity   [x]Lymphedema   []Functional transfers/mobility   []ADLs   []Strength    []Cognition  []IADLs   []Safety Awareness   []  Motor Endurance    []Fine Motor Coordination   []Balance   []Vision/perception  []Sensation []Gross Motor Coordination  [x]ROM      OT PLAN OF CARE   OT POC based on physician orders, patient diagnosis and results of clinical assessment     Frequency/Duration:  1-2x per week for 12 treatment sessions from 2025 through 2025  Specific OT Treatment to include:      Plan of Care:      [x]97140-Manual Lymph Drainage and Combined Decongestive Therapy  [x]14622- Skilled Multilayer Short Stretch Compression Bandaging/ Therapeutic Exercise  [x]Skin Care Education           [x]HEP including Self MLD Education and/or Self Bandaging  [x]Education for Lymphedema Risks/Precautions     [x] Caregiver Education   []other:        Patient Specific Goal: for this to stop hurting; for swelling to go down     STG: 3 weeks   Patient will demonstrate knowledge and understanding for lymphedema precautions, skin care and self

## 2025-03-20 ENCOUNTER — HOSPITAL ENCOUNTER (OUTPATIENT)
Dept: OCCUPATIONAL THERAPY | Age: 75
Setting detail: THERAPIES SERIES
Discharge: HOME | End: 2025-03-20
Payer: MEDICARE

## 2025-03-20 PROCEDURE — 97535 SELF CARE MNGMENT TRAINING: CPT

## 2025-03-20 PROCEDURE — 97140 MANUAL THERAPY 1/> REGIONS: CPT

## 2025-03-20 NOTE — PROGRESS NOTES
Occupational Therapy      OCCUPATIONAL THERAPY DAILY TREATMENT NOTE  Phone: [phone]  Fax: [phone]     Date:  3/20/2025  Initial Evaluation Date: 2025                                     Evaluating Therapist: LEANDRO Collado/L, CLT-JAMES     Patient Name:  Dotty Shultz                   :  1950     Restrictions/Precautions:   fall risk  Diagnosis:  Personal history of breast cancer (Z85.3)                                                           Date of Surgery/Injury: n/a     Insurance/Certification information:  Medicare Part A and B                   [id number]  Plan of care signed (Y/N): N  Visit# / total visits: Evaluation +6 treatment     Referring Practitioner:  Jaelyn Devi MD                                 NPI:  [id number]  Specific Practitioner Orders: Evaluation and Treatment     Assessment of current deficits   []Pain  []Skin Integrity   [x]Lymphedema   []Functional transfers/mobility   []ADLs   []Strength    []Cognition  []IADLs   []Safety Awareness   []  Motor Endurance    []Fine Motor Coordination   []Balance   []Vision/perception  []Sensation []Gross Motor Coordination  [x]ROM      OT PLAN OF CARE   OT POC based on physician orders, patient diagnosis and results of clinical assessment     Frequency/Duration:  1-2x per week for 12 treatment sessions from 2025 through 2025  Specific OT Treatment to include:      Plan of Care:      [x]97140-Manual Lymph Drainage and Combined Decongestive Therapy  [x]27916- Skilled Multilayer Short Stretch Compression Bandaging/ Therapeutic Exercise  [x]Skin Care Education           [x]HEP including Self MLD Education and/or Self Bandaging  [x]Education for Lymphedema Risks/Precautions     [x] Caregiver Education   []other:        Patient Specific Goal: for this to stop hurting; for swelling to go down     STG: 3 weeks   Patient will demonstrate knowledge and understanding for lymphedema precautions, skin care and self

## 2025-04-18 ENCOUNTER — HOSPITAL ENCOUNTER (OUTPATIENT)
Dept: OCCUPATIONAL THERAPY | Age: 75
Setting detail: THERAPIES SERIES
Discharge: HOME | End: 2025-04-18

## 2025-04-18 NOTE — PROGRESS NOTES
Occupational Therapy  OCCUPATIONAL THERAPY UPDATE/PROGRESS NOTE  [place] [address] [facility] [facility]  [address]              Phone: [phone]             Fax: [phone]     Date:  2025  Initial Evaluation Date: 2025     Evaluating Therapist: LEANDRO Collado/L, CLT-JAMES    Patient Name:  Dotty Shultz    :  1950    Restrictions/Precautions:   fall risk  Diagnosis:  Personal history of breast cancer (Z85.3)         Date of Surgery/Injury: n/a    Insurance/Certification information:  Medicare Part A and B [id number]   Plan of care signed (Y/N): N  Visit#:  7   Total Visits to date:  Evaluation +Visist #7   Current update duration from 2025 to 2025  Cancels/No-shows to date: 0  Last seen by therapist:  2025  Patient reaction to treatment:  patient making steady gains toward goals. Patient continues to utilize basic lymphedema pump with no improvement noted.  Patient continues to have increased pain / discomfort and swelling along flank extending into lateral portion of breast despite use of lymphedema pump and following traditional care as directed by lymphedema therapist.  Patient continues to follow low sodium eating lifestyle, increasing activity / exercise levels, and utilizing her compression garments daily.  Patient has been approved for advanced lymphedema pump.  Patient will have improved management of her swelling with advanced lymphedema pump with use of pre / post clearing and wave function of pump.    Garment / DME recommended:  compression sleeve, new compression bras as appropriate, advanced lymphedema pump.    Referring Practitioner:  Jaelyn Devi MD    NPI: [id number]   Specific Practitioner Orders: Evaluation and Treatment    Assessment of current deficits   []Pain  []Skin Integrity   [x]Lymphedema   []Functional transfers/mobility   []ADLs   []Strength    []Cognition  []IADLs   []Safety Awareness   []  Motor

## 2025-05-06 ENCOUNTER — HOSPITAL ENCOUNTER (OUTPATIENT)
Dept: GENERAL RADIOLOGY | Age: 75
Discharge: HOME | End: 2025-05-08
Attending: INTERNAL MEDICINE
Payer: MEDICARE

## 2025-05-06 DIAGNOSIS — Z85.3 PERSONAL HISTORY OF BREAST CANCER: Primary | ICD-10-CM

## 2025-05-06 DIAGNOSIS — Z12.31 ENCOUNTER FOR SCREENING MAMMOGRAM FOR MALIGNANT NEOPLASM OF BREAST: ICD-10-CM

## 2025-05-06 PROCEDURE — 77063 BREAST TOMOSYNTHESIS BI: CPT

## 2025-05-09 ENCOUNTER — OFFICE VISIT (OUTPATIENT)
Dept: ONCOLOGY | Age: 75
End: 2025-05-09
Payer: MEDICARE

## 2025-05-09 ENCOUNTER — HOSPITAL ENCOUNTER (OUTPATIENT)
Dept: INFUSION THERAPY | Age: 75
Discharge: HOME | End: 2025-05-09
Payer: MEDICARE

## 2025-05-09 VITALS
OXYGEN SATURATION: 97 % | TEMPERATURE: 97.7 F | WEIGHT: 191.2 LBS | HEART RATE: 71 BPM | HEIGHT: 64 IN | DIASTOLIC BLOOD PRESSURE: 61 MMHG | SYSTOLIC BLOOD PRESSURE: 127 MMHG | BODY MASS INDEX: 32.64 KG/M2

## 2025-05-09 DIAGNOSIS — N63.25 UNSPECIFIED LUMP IN THE LEFT BREAST, OVERLAPPING QUADRANTS: ICD-10-CM

## 2025-05-09 DIAGNOSIS — Z85.3 PERSONAL HISTORY OF BREAST CANCER: Primary | ICD-10-CM

## 2025-05-09 DIAGNOSIS — N63.20 MASS OF LEFT BREAST, UNSPECIFIED QUADRANT: Primary | ICD-10-CM

## 2025-05-09 LAB
ALBUMIN SERPL-MCNC: 3.8 G/DL (ref 3.5–5.2)
ALP SERPL-CCNC: 105 U/L (ref 35–104)
ALT SERPL-CCNC: 7 U/L (ref 0–35)
ANION GAP SERPL CALCULATED.3IONS-SCNC: 11 MMOL/L (ref 7–16)
AST SERPL-CCNC: 16 U/L (ref 0–35)
BASOPHILS # BLD: 0.04 K/UL (ref 0–0.2)
BASOPHILS NFR BLD: 1 % (ref 0–2)
BILIRUB SERPL-MCNC: 0.4 MG/DL (ref 0–1.2)
BUN SERPL-MCNC: 8 MG/DL (ref 8–23)
CALCIUM SERPL-MCNC: 9.3 MG/DL (ref 8.8–10.2)
CHLORIDE SERPL-SCNC: 104 MMOL/L (ref 98–107)
CO2 SERPL-SCNC: 25 MMOL/L (ref 22–29)
CREAT SERPL-MCNC: 0.9 MG/DL (ref 0.5–1)
EOSINOPHIL # BLD: 0.22 K/UL (ref 0.05–0.5)
EOSINOPHILS RELATIVE PERCENT: 5 % (ref 0–6)
ERYTHROCYTE [DISTWIDTH] IN BLOOD BY AUTOMATED COUNT: 13.2 % (ref 11.5–15)
GFR, ESTIMATED: 71 ML/MIN/1.73M2
GLUCOSE SERPL-MCNC: 159 MG/DL (ref 74–99)
HCT VFR BLD AUTO: 39.8 % (ref 34–48)
HGB BLD-MCNC: 13.2 G/DL (ref 11.5–15.5)
IMM GRANULOCYTES # BLD AUTO: <0.03 K/UL (ref 0–0.58)
IMM GRANULOCYTES NFR BLD: 0 % (ref 0–5)
LYMPHOCYTES NFR BLD: 1.01 K/UL (ref 1.5–4)
LYMPHOCYTES RELATIVE PERCENT: 23 % (ref 20–42)
MCH RBC QN AUTO: 29.9 PG (ref 26–35)
MCHC RBC AUTO-ENTMCNC: 33.2 G/DL (ref 32–34.5)
MCV RBC AUTO: 90 FL (ref 80–99.9)
MONOCYTES NFR BLD: 0.48 K/UL (ref 0.1–0.95)
MONOCYTES NFR BLD: 11 % (ref 2–12)
NEUTROPHILS NFR BLD: 61 % (ref 43–80)
NEUTS SEG NFR BLD: 2.71 K/UL (ref 1.8–7.3)
PLATELET # BLD AUTO: 169 K/UL (ref 130–450)
PMV BLD AUTO: 9.9 FL (ref 7–12)
POTASSIUM SERPL-SCNC: 4.6 MMOL/L (ref 3.5–5.1)
PROT SERPL-MCNC: 6.7 G/DL (ref 6.4–8.3)
RBC # BLD AUTO: 4.42 M/UL (ref 3.5–5.5)
SODIUM SERPL-SCNC: 140 MMOL/L (ref 136–145)
WBC OTHER # BLD: 4.5 K/UL (ref 4.5–11.5)

## 2025-05-09 PROCEDURE — G8417 CALC BMI ABV UP PARAM F/U: HCPCS | Performed by: INTERNAL MEDICINE

## 2025-05-09 PROCEDURE — 80053 COMPREHEN METABOLIC PANEL: CPT

## 2025-05-09 PROCEDURE — 1159F MED LIST DOCD IN RCRD: CPT | Performed by: INTERNAL MEDICINE

## 2025-05-09 PROCEDURE — 1126F AMNT PAIN NOTED NONE PRSNT: CPT | Performed by: INTERNAL MEDICINE

## 2025-05-09 PROCEDURE — 1160F RVW MEDS BY RX/DR IN RCRD: CPT | Performed by: INTERNAL MEDICINE

## 2025-05-09 PROCEDURE — 1123F ACP DISCUSS/DSCN MKR DOCD: CPT | Performed by: INTERNAL MEDICINE

## 2025-05-09 PROCEDURE — 36415 COLL VENOUS BLD VENIPUNCTURE: CPT

## 2025-05-09 PROCEDURE — 85025 COMPLETE CBC W/AUTO DIFF WBC: CPT

## 2025-05-09 PROCEDURE — 1036F TOBACCO NON-USER: CPT | Performed by: INTERNAL MEDICINE

## 2025-05-09 PROCEDURE — 99214 OFFICE O/P EST MOD 30 MIN: CPT | Performed by: INTERNAL MEDICINE

## 2025-05-09 PROCEDURE — 1090F PRES/ABSN URINE INCON ASSESS: CPT | Performed by: INTERNAL MEDICINE

## 2025-05-09 PROCEDURE — G8399 PT W/DXA RESULTS DOCUMENT: HCPCS | Performed by: INTERNAL MEDICINE

## 2025-05-09 PROCEDURE — G8427 DOCREV CUR MEDS BY ELIG CLIN: HCPCS | Performed by: INTERNAL MEDICINE

## 2025-05-09 RX ORDER — SODIUM CHLORIDE 0.9 % (FLUSH) 0.9 %
5-40 SYRINGE (ML) INJECTION PRN
Status: DISCONTINUED | OUTPATIENT
Start: 2025-05-09 | End: 2025-05-10 | Stop reason: HOSPADM

## 2025-05-09 RX ORDER — HEPARIN 100 UNIT/ML
500 SYRINGE INTRAVENOUS PRN
Status: DISCONTINUED | OUTPATIENT
Start: 2025-05-09 | End: 2025-05-10 | Stop reason: HOSPADM

## 2025-05-09 NOTE — PROGRESS NOTES
Patient refused printed AVS, pt states they have MYCHART. All questions answered.  Patient requested doctor appointment card with next follow up appointment written on it.

## 2025-05-09 NOTE — PROGRESS NOTES
[facility] PHYSICIANS [facility] MED ONCOLOGY  [address] [facility] [address]  Dept: [phone]  Loc: [phone]  Attending progress note      Reason for Visit:   Left breast cancer.    Referring Physician: Dr. Weber.    PCP:  Clare Poon PA-C    History of Present Illness:     Mrs. Shultz is a 74-year-old lady, with a past medical history significant for anxiety, hypothyroidism, and peripheral neuropathy, had presented with an abnormal screening mammogram from 5/24/2022, it had revealed new 3 6 mm mass in the upper outer left breast and 19 mm lymph node in the left axilla, on 5/27/2022 she underwent an ultrasound-guided left breast mass core biopsy, pathology had revealed invasive poorly differentiated carcinoma, grade 3, ER +80% MI +90% HER2/alex 2+ by IHC, positive by FISH, the patient had on 6/20/2022 CT scans of the chest, abdomen and pelvis done, revealing enlarged left axillary lymph node measuring just under 2 cm, no evidence of distant metastatic disease, she had a 2D echocardiogram done on 8/28/2022, revealing normal LVEF at 70%, patient was under the care of Dr. Weber, she received the first cycle of TCHP on 8/22/2022, the course was complicated by C. difficile infection which was diagnosed on 9/8/2022.  She completed the course of oral vancomycin.  The patient had just started to feel rare and like herself again, the diarrhea had resolved.  The breast mass had decreased in size.    The patient received Zinplava on 10/31/2022.  On 11/1/2022, the patient received the second cycle of chemotherapy, TCH, the patient was having brain fog after the chemotherapy, brain MRI was done on 11/25/2022, revealing diffuse wall thickening and FLAIR hyperintensity of the dura, with progression, differential diagnoses include but are not limited to intracranial hypertension, meningitis, neurosarcoidosis, lymphoma, hypertrophic pachymeningitis, meningeal metastasis and

## 2025-06-24 ENCOUNTER — HOSPITAL ENCOUNTER (OUTPATIENT)
Dept: GENERAL RADIOLOGY | Age: 75
Discharge: HOME | End: 2025-06-26
Attending: INTERNAL MEDICINE
Payer: MEDICARE

## 2025-06-24 DIAGNOSIS — N63.25 UNSPECIFIED LUMP IN THE LEFT BREAST, OVERLAPPING QUADRANTS: ICD-10-CM

## 2025-06-24 DIAGNOSIS — N63.20 MASS OF LEFT BREAST, UNSPECIFIED QUADRANT: ICD-10-CM

## 2025-06-24 PROCEDURE — 76642 ULTRASOUND BREAST LIMITED: CPT

## 2025-07-16 ENCOUNTER — HOSPITAL ENCOUNTER (OUTPATIENT)
Dept: OCCUPATIONAL THERAPY | Age: 75
Setting detail: THERAPIES SERIES
Discharge: HOME | End: 2025-07-16

## 2025-07-16 NOTE — PROGRESS NOTES
Occupational Therapy  OCCUPATIONAL THERAPY UPDATE/PROGRESS NOTE  [place] [address] [address] [facility]  [address]              Phone: [phone]             Fax: [phone]     Date:  2025  Initial Evaluation Date: 2025     Evaluating Therapist: LEANDRO Collado/L, CLT-JAMES    Patient Name:  oDtty Shultz    :  1950    Restrictions/Precautions:   fall risk  Diagnosis:  Personal history of breast cancer (Z85.3)         Date of Surgery/Injury: n/a    Insurance/Certification information:  Medicare Part A and B [id number]   Plan of care signed (Y/N): N  Visit#:  8   Total Visits to date:  Evaluation +Visist #8   Current update duration from 2025 to 2025  Cancels/No-shows to date: 0  Last seen by therapist:  2025  Patient reaction to treatment:  patient making steady gains toward goals. Patient continues to utilize basic lymphedema pump with no improvement noted.  Patient continues to have increased pain / discomfort and swelling along flank extending into lateral portion of breast despite use of lymphedema pump and following traditional care as directed by lymphedema therapist.  Patient continues to follow low sodium eating lifestyle, increasing activity / exercise levels, and utilizing her compression garments daily.  Patient has been approved for advanced lymphedema pump.  Patient will have improved management of her swelling with advanced lymphedema pump with use of pre / post clearing and wave function of pump.    Garment / DME recommended:  compression sleeve, new compression bras as appropriate, advanced lymphedema pump.    Referring Practitioner:  Jaelyn Devi MD    NPI: [id number]   Specific Practitioner Orders: Evaluation and Treatment    Assessment of current deficits   []Pain  []Skin Integrity   [x]Lymphedema   []Functional transfers/mobility   []ADLs   []Strength    []Cognition  []IADLs   []Safety Awareness   []  Motor

## 2025-08-08 ENCOUNTER — OFFICE VISIT (OUTPATIENT)
Age: 75
End: 2025-08-08
Payer: MEDICARE

## 2025-08-08 ENCOUNTER — HOSPITAL ENCOUNTER (OUTPATIENT)
Dept: INFUSION THERAPY | Age: 75
Discharge: HOME | End: 2025-08-08
Payer: MEDICARE

## 2025-08-08 VITALS
BODY MASS INDEX: 32.64 KG/M2 | OXYGEN SATURATION: 96 % | WEIGHT: 191.2 LBS | DIASTOLIC BLOOD PRESSURE: 57 MMHG | SYSTOLIC BLOOD PRESSURE: 118 MMHG | HEIGHT: 64 IN | TEMPERATURE: 97.4 F | HEART RATE: 64 BPM

## 2025-08-08 DIAGNOSIS — Z85.3 PERSONAL HISTORY OF BREAST CANCER: ICD-10-CM

## 2025-08-08 DIAGNOSIS — N63.20 MASS OF LEFT BREAST, UNSPECIFIED QUADRANT: Primary | ICD-10-CM

## 2025-08-08 LAB
ALBUMIN SERPL-MCNC: 3.7 G/DL (ref 3.5–5.2)
ALP SERPL-CCNC: 103 U/L (ref 35–104)
ALT SERPL-CCNC: 7 U/L (ref 0–35)
ANION GAP SERPL CALCULATED.3IONS-SCNC: 9 MMOL/L (ref 7–16)
AST SERPL-CCNC: 15 U/L (ref 0–35)
BASOPHILS # BLD: 0.04 K/UL (ref 0–0.2)
BASOPHILS NFR BLD: 1 % (ref 0–2)
BILIRUB SERPL-MCNC: 0.3 MG/DL (ref 0–1.2)
BUN SERPL-MCNC: 10 MG/DL (ref 8–23)
CALCIUM SERPL-MCNC: 9.4 MG/DL (ref 8.8–10.2)
CHLORIDE SERPL-SCNC: 107 MMOL/L (ref 98–107)
CO2 SERPL-SCNC: 27 MMOL/L (ref 22–29)
CREAT SERPL-MCNC: 1 MG/DL (ref 0.5–1)
EOSINOPHIL # BLD: 0.17 K/UL (ref 0.05–0.5)
EOSINOPHILS RELATIVE PERCENT: 4 % (ref 0–6)
ERYTHROCYTE [DISTWIDTH] IN BLOOD BY AUTOMATED COUNT: 13.3 % (ref 11.5–15)
GFR, ESTIMATED: 61 ML/MIN/1.73M2
GLUCOSE SERPL-MCNC: 122 MG/DL (ref 74–99)
HCT VFR BLD AUTO: 37.7 % (ref 34–48)
HGB BLD-MCNC: 12.4 G/DL (ref 11.5–15.5)
IMM GRANULOCYTES # BLD AUTO: <0.03 K/UL (ref 0–0.58)
IMM GRANULOCYTES NFR BLD: 1 % (ref 0–5)
LYMPHOCYTES NFR BLD: 0.97 K/UL (ref 1.5–4)
LYMPHOCYTES RELATIVE PERCENT: 22 % (ref 20–42)
MCH RBC QN AUTO: 29.9 PG (ref 26–35)
MCHC RBC AUTO-ENTMCNC: 32.9 G/DL (ref 32–34.5)
MCV RBC AUTO: 90.8 FL (ref 80–99.9)
MONOCYTES NFR BLD: 0.48 K/UL (ref 0.1–0.95)
MONOCYTES NFR BLD: 11 % (ref 2–12)
NEUTROPHILS NFR BLD: 62 % (ref 43–80)
NEUTS SEG NFR BLD: 2.73 K/UL (ref 1.8–7.3)
PLATELET # BLD AUTO: 173 K/UL (ref 130–450)
PMV BLD AUTO: 10 FL (ref 7–12)
POTASSIUM SERPL-SCNC: 4.8 MMOL/L (ref 3.5–5.1)
PROT SERPL-MCNC: 6.6 G/DL (ref 6.4–8.3)
RBC # BLD AUTO: 4.15 M/UL (ref 3.5–5.5)
SODIUM SERPL-SCNC: 143 MMOL/L (ref 136–145)
WBC OTHER # BLD: 4.4 K/UL (ref 4.5–11.5)

## 2025-08-08 PROCEDURE — 36415 COLL VENOUS BLD VENIPUNCTURE: CPT

## 2025-08-08 PROCEDURE — G8399 PT W/DXA RESULTS DOCUMENT: HCPCS | Performed by: INTERNAL MEDICINE

## 2025-08-08 PROCEDURE — 1123F ACP DISCUSS/DSCN MKR DOCD: CPT | Performed by: INTERNAL MEDICINE

## 2025-08-08 PROCEDURE — 85025 COMPLETE CBC W/AUTO DIFF WBC: CPT

## 2025-08-08 PROCEDURE — 1160F RVW MEDS BY RX/DR IN RCRD: CPT | Performed by: INTERNAL MEDICINE

## 2025-08-08 PROCEDURE — 1090F PRES/ABSN URINE INCON ASSESS: CPT | Performed by: INTERNAL MEDICINE

## 2025-08-08 PROCEDURE — 80053 COMPREHEN METABOLIC PANEL: CPT

## 2025-08-08 PROCEDURE — 1036F TOBACCO NON-USER: CPT | Performed by: INTERNAL MEDICINE

## 2025-08-08 PROCEDURE — 99214 OFFICE O/P EST MOD 30 MIN: CPT | Performed by: INTERNAL MEDICINE

## 2025-08-08 PROCEDURE — 1159F MED LIST DOCD IN RCRD: CPT | Performed by: INTERNAL MEDICINE

## 2025-08-08 PROCEDURE — 1126F AMNT PAIN NOTED NONE PRSNT: CPT | Performed by: INTERNAL MEDICINE

## 2025-08-08 PROCEDURE — G8427 DOCREV CUR MEDS BY ELIG CLIN: HCPCS | Performed by: INTERNAL MEDICINE

## 2025-08-08 PROCEDURE — 99213 OFFICE O/P EST LOW 20 MIN: CPT | Performed by: INTERNAL MEDICINE

## 2025-08-08 PROCEDURE — G8417 CALC BMI ABV UP PARAM F/U: HCPCS | Performed by: INTERNAL MEDICINE

## (undated) DEVICE — STERILE POLYISOPRENE POWDER-FREE SURGICAL GLOVES: Brand: PROTEXIS

## (undated) DEVICE — 3 ML SYRINGE LUER-LOCK TIP: Brand: MONOJECT

## (undated) DEVICE — SURGICAL PROCEDURE PACK CATRCT LT EYE BASIC CUST ST JOS LF

## (undated) DEVICE — Device

## (undated) DEVICE — SOLUTION SCRB 32OZ 7.5% POVIDONE IOD BTL GENTLE EFFECTIVE

## (undated) DEVICE — ENCORE® LATEX MICRO SIZE 8.5, STERILE LATEX POWDER-FREE SURGICAL GLOVE: Brand: ENCORE

## (undated) DEVICE — SOLUTION IV IRRIG POUR BRL 0.9% SODIUM CHL 2F7124

## (undated) DEVICE — SOLUTION IV IRRIG WATER 1000ML POUR BRL 2F7114